# Patient Record
Sex: MALE | Race: WHITE | NOT HISPANIC OR LATINO | Employment: OTHER | URBAN - METROPOLITAN AREA
[De-identification: names, ages, dates, MRNs, and addresses within clinical notes are randomized per-mention and may not be internally consistent; named-entity substitution may affect disease eponyms.]

---

## 2023-06-19 ENCOUNTER — APPOINTMENT (OUTPATIENT)
Dept: PRIMARY CARE | Facility: CLINIC | Age: 53
End: 2023-06-19
Payer: COMMERCIAL

## 2023-06-19 PROBLEM — M25.561 RIGHT KNEE PAIN: Status: ACTIVE | Noted: 2023-06-19

## 2023-06-19 PROBLEM — H52.00 HYPEROPIA: Status: ACTIVE | Noted: 2023-06-19

## 2023-06-19 PROBLEM — Z86.69 H/O OPTIC NEURITIS: Status: ACTIVE | Noted: 2023-06-19

## 2023-06-19 PROBLEM — R39.12 WEAK URINARY STREAM: Status: ACTIVE | Noted: 2023-06-19

## 2023-06-19 PROBLEM — M54.2 CERVICAL PAIN: Status: ACTIVE | Noted: 2023-06-19

## 2023-06-19 PROBLEM — H46.9 BILATERAL OPTIC NEURITIS: Status: ACTIVE | Noted: 2023-06-19

## 2023-06-19 PROBLEM — J30.9 ALLERGIC RHINITIS: Status: ACTIVE | Noted: 2023-06-19

## 2023-06-19 PROBLEM — M17.11 ARTHRITIS OF KNEE, RIGHT: Status: ACTIVE | Noted: 2023-06-19

## 2023-06-19 PROBLEM — J01.20 ACUTE NON-RECURRENT ETHMOIDAL SINUSITIS: Status: RESOLVED | Noted: 2023-06-19 | Resolved: 2023-06-19

## 2023-06-19 PROBLEM — N41.9 PROSTATITIS: Status: RESOLVED | Noted: 2023-06-19 | Resolved: 2023-06-19

## 2023-06-19 PROBLEM — M25.562 LEFT KNEE PAIN: Status: ACTIVE | Noted: 2023-06-19

## 2023-06-19 PROBLEM — B36.0 PITYRIASIS VERSICOLOR: Status: ACTIVE | Noted: 2023-06-19

## 2023-06-19 PROBLEM — N50.89 LUMP IN THE TESTICLE: Status: ACTIVE | Noted: 2023-06-19

## 2023-06-19 PROBLEM — M25.511 RIGHT SHOULDER PAIN: Status: ACTIVE | Noted: 2023-06-19

## 2023-06-19 RX ORDER — HYDROGEN PEROXIDE 3 %
1 SOLUTION, NON-ORAL MISCELLANEOUS DAILY
COMMUNITY
End: 2023-06-20

## 2023-06-19 RX ORDER — KETOCONAZOLE 20 MG/ML
SHAMPOO, SUSPENSION TOPICAL
COMMUNITY
Start: 2017-05-23 | End: 2023-06-20

## 2023-06-19 RX ORDER — CETIRIZINE HYDROCHLORIDE 10 MG/1
10 TABLET ORAL DAILY
COMMUNITY
End: 2023-06-20

## 2023-06-19 RX ORDER — POLYMYXIN B SULFATE AND TRIMETHOPRIM 1; 10000 MG/ML; [USP'U]/ML
SOLUTION OPHTHALMIC
COMMUNITY
Start: 2021-12-29 | End: 2023-06-20

## 2023-06-19 RX ORDER — ALBUTEROL SULFATE 90 UG/1
AEROSOL, METERED RESPIRATORY (INHALATION)
COMMUNITY
Start: 2017-08-01 | End: 2023-06-20

## 2023-06-19 RX ORDER — MELOXICAM 7.5 MG/1
1 TABLET ORAL 2 TIMES DAILY PRN
COMMUNITY
Start: 2019-06-14 | End: 2023-06-20

## 2023-06-19 RX ORDER — MONTELUKAST SODIUM 10 MG/1
1 TABLET ORAL DAILY
COMMUNITY
Start: 2017-08-01 | End: 2023-06-20

## 2023-06-20 ENCOUNTER — OFFICE VISIT (OUTPATIENT)
Dept: PRIMARY CARE | Facility: CLINIC | Age: 53
End: 2023-06-20
Payer: COMMERCIAL

## 2023-06-20 VITALS
WEIGHT: 214 LBS | HEIGHT: 69 IN | SYSTOLIC BLOOD PRESSURE: 134 MMHG | OXYGEN SATURATION: 99 % | BODY MASS INDEX: 31.7 KG/M2 | TEMPERATURE: 98.6 F | DIASTOLIC BLOOD PRESSURE: 89 MMHG | HEART RATE: 84 BPM

## 2023-06-20 DIAGNOSIS — J01.00 ACUTE NON-RECURRENT MAXILLARY SINUSITIS: Primary | ICD-10-CM

## 2023-06-20 PROCEDURE — 1036F TOBACCO NON-USER: CPT | Performed by: FAMILY MEDICINE

## 2023-06-20 PROCEDURE — 99213 OFFICE O/P EST LOW 20 MIN: CPT | Performed by: FAMILY MEDICINE

## 2023-06-20 RX ORDER — DOXYCYCLINE 100 MG/1
100 CAPSULE ORAL 2 TIMES DAILY
Qty: 20 CAPSULE | Refills: 0 | Status: SHIPPED | OUTPATIENT
Start: 2023-06-20 | End: 2023-06-30

## 2023-06-20 RX ORDER — AMOXICILLIN AND CLAVULANATE POTASSIUM 875; 125 MG/1; MG/1
TABLET, FILM COATED ORAL
COMMUNITY
Start: 2023-06-17 | End: 2023-06-20

## 2023-06-20 ASSESSMENT — ENCOUNTER SYMPTOMS: COUGH: 0

## 2023-06-20 NOTE — PROGRESS NOTES
"Subjective   Patient ID: Rafael Singh is a 53 y.o. male who presents for Multiple sx's with his wife; [place]; less than two weeks after his sx's onset; ST fever, body aches, URI congestion and a rash on his arms. Pt phoned the [facility] twice; advised to try sudafed and Afrin for 3 days; did that and never improved. Called the [facility] a second time and was prescribe Augmentin which he has been on since Sunday. Lastly, Wife states he had a tick on his right lower leg and feels this may be related. Temp has been as high as 103.0F orally.    Fevers 101-102   Sick over the last  1 week  Fevers chills   Augmentin last 2 days  A little better with headache     Sore throat     Negative covid     Bite on leg, rash on arms   No ocean          Review of Systems   Respiratory:  Negative for cough.        Objective   /89   Pulse 84   Temp 37 °C (98.6 °F)   Ht 1.753 m (5' 9\")   Wt 97.1 kg (214 lb)   SpO2 99%   BMI 31.60 kg/m²     Physical Exam  Constitutional:       Appearance: Normal appearance.   HENT:      Head: Normocephalic and atraumatic.      Right Ear: Ear canal normal. Tympanic membrane is erythematous.      Left Ear: Tympanic membrane and ear canal normal.      Nose: No nasal deformity.      Right Sinus: Maxillary sinus tenderness present. No frontal sinus tenderness.      Left Sinus: Maxillary sinus tenderness present. No frontal sinus tenderness.      Mouth/Throat:      Mouth: Mucous membranes are moist. No oral lesions.      Tongue: No lesions.      Pharynx: Oropharynx is clear.   Cardiovascular:      Rate and Rhythm: Normal rate and regular rhythm.   Pulmonary:      Effort: Pulmonary effort is normal.      Breath sounds: Normal breath sounds.   Musculoskeletal:      Cervical back: No tenderness.   Lymphadenopathy:      Cervical: No cervical adenopathy.   Skin:     Comments: Scattered erythematous papules diffusely on the arms, legs, back, abdomen, present on the " bathing suit area as well as the rest of the body   Neurological:      Mental Status: He is alert.         Assessment/Plan

## 2023-06-20 NOTE — PATIENT INSTRUCTIONS
Rash: Possibly viral, unlikely medication eruption, could be see bather's   dermatitis    Tick bite is not significant because it was not implanted    We will change antibiotics though due to persisting fever on the Augmentin

## 2023-08-08 RX ORDER — KETOCONAZOLE 20 MG/ML
SHAMPOO, SUSPENSION TOPICAL
COMMUNITY
Start: 2013-10-08 | End: 2023-08-10 | Stop reason: SDUPTHER

## 2023-08-08 RX ORDER — VITAMIN A 3000 MCG
CAPSULE ORAL
COMMUNITY
Start: 2023-06-18

## 2023-08-10 ENCOUNTER — OFFICE VISIT (OUTPATIENT)
Dept: PRIMARY CARE | Facility: CLINIC | Age: 53
End: 2023-08-10
Payer: COMMERCIAL

## 2023-08-10 ENCOUNTER — LAB (OUTPATIENT)
Dept: LAB | Facility: LAB | Age: 53
End: 2023-08-10
Payer: COMMERCIAL

## 2023-08-10 VITALS
DIASTOLIC BLOOD PRESSURE: 84 MMHG | TEMPERATURE: 98.2 F | WEIGHT: 217 LBS | OXYGEN SATURATION: 97 % | SYSTOLIC BLOOD PRESSURE: 136 MMHG | HEIGHT: 69 IN | BODY MASS INDEX: 32.14 KG/M2 | HEART RATE: 55 BPM

## 2023-08-10 DIAGNOSIS — Z12.11 ENCOUNTER FOR COLORECTAL CANCER SCREENING: ICD-10-CM

## 2023-08-10 DIAGNOSIS — Z12.12 ENCOUNTER FOR COLORECTAL CANCER SCREENING: ICD-10-CM

## 2023-08-10 DIAGNOSIS — Z13.220 LIPID SCREENING: ICD-10-CM

## 2023-08-10 DIAGNOSIS — Z23 NEED FOR SHINGLES VACCINE: ICD-10-CM

## 2023-08-10 DIAGNOSIS — R06.09 DYSPNEA ON EXERTION: ICD-10-CM

## 2023-08-10 DIAGNOSIS — Z23 NEED FOR DTAP VACCINATION: ICD-10-CM

## 2023-08-10 DIAGNOSIS — J30.9 ALLERGIC RHINITIS, UNSPECIFIED SEASONALITY, UNSPECIFIED TRIGGER: ICD-10-CM

## 2023-08-10 DIAGNOSIS — R06.83 SNORING: ICD-10-CM

## 2023-08-10 DIAGNOSIS — Z00.00 ROUTINE GENERAL MEDICAL EXAMINATION AT A HEALTH CARE FACILITY: Primary | ICD-10-CM

## 2023-08-10 DIAGNOSIS — R53.83 FATIGUE, UNSPECIFIED TYPE: ICD-10-CM

## 2023-08-10 DIAGNOSIS — B36.0 PITYRIASIS VERSICOLOR: ICD-10-CM

## 2023-08-10 DIAGNOSIS — Z13.6 ENCOUNTER FOR SCREENING FOR CARDIOVASCULAR DISORDERS: ICD-10-CM

## 2023-08-10 DIAGNOSIS — Z00.00 ROUTINE GENERAL MEDICAL EXAMINATION AT A HEALTH CARE FACILITY: ICD-10-CM

## 2023-08-10 LAB
ALANINE AMINOTRANSFERASE (SGPT) (U/L) IN SER/PLAS: 52 U/L (ref 10–52)
ALBUMIN (G/DL) IN SER/PLAS: 4.5 G/DL (ref 3.4–5)
ALKALINE PHOSPHATASE (U/L) IN SER/PLAS: 72 U/L (ref 33–120)
ANION GAP IN SER/PLAS: 13 MMOL/L (ref 10–20)
ASPARTATE AMINOTRANSFERASE (SGOT) (U/L) IN SER/PLAS: 33 U/L (ref 9–39)
BILIRUBIN TOTAL (MG/DL) IN SER/PLAS: 0.5 MG/DL (ref 0–1.2)
CALCIUM (MG/DL) IN SER/PLAS: 9.3 MG/DL (ref 8.6–10.3)
CARBON DIOXIDE, TOTAL (MMOL/L) IN SER/PLAS: 27 MMOL/L (ref 21–32)
CHLORIDE (MMOL/L) IN SER/PLAS: 102 MMOL/L (ref 98–107)
CHOLESTEROL (MG/DL) IN SER/PLAS: 211 MG/DL (ref 0–199)
CHOLESTEROL IN HDL (MG/DL) IN SER/PLAS: 53.2 MG/DL
CHOLESTEROL/HDL RATIO: 4
CREATININE (MG/DL) IN SER/PLAS: 0.99 MG/DL (ref 0.5–1.3)
ERYTHROCYTE DISTRIBUTION WIDTH (RATIO) BY AUTOMATED COUNT: 13.4 % (ref 11.5–14.5)
ERYTHROCYTE MEAN CORPUSCULAR HEMOGLOBIN CONCENTRATION (G/DL) BY AUTOMATED: 32.6 G/DL (ref 32–36)
ERYTHROCYTE MEAN CORPUSCULAR VOLUME (FL) BY AUTOMATED COUNT: 86 FL (ref 80–100)
ERYTHROCYTES (10*6/UL) IN BLOOD BY AUTOMATED COUNT: 4.97 X10E12/L (ref 4.5–5.9)
GFR MALE: >90 ML/MIN/1.73M2
GLUCOSE (MG/DL) IN SER/PLAS: 97 MG/DL (ref 74–99)
HEMATOCRIT (%) IN BLOOD BY AUTOMATED COUNT: 42.6 % (ref 41–52)
HEMOGLOBIN (G/DL) IN BLOOD: 13.9 G/DL (ref 13.5–17.5)
LDL: 122 MG/DL (ref 0–99)
LEUKOCYTES (10*3/UL) IN BLOOD BY AUTOMATED COUNT: 7.1 X10E9/L (ref 4.4–11.3)
PLATELETS (10*3/UL) IN BLOOD AUTOMATED COUNT: 256 X10E9/L (ref 150–450)
POTASSIUM (MMOL/L) IN SER/PLAS: 4.3 MMOL/L (ref 3.5–5.3)
PROTEIN TOTAL: 7.3 G/DL (ref 6.4–8.2)
SODIUM (MMOL/L) IN SER/PLAS: 138 MMOL/L (ref 136–145)
THYROTROPIN (MIU/L) IN SER/PLAS BY DETECTION LIMIT <= 0.05 MIU/L: 2.4 MIU/L (ref 0.44–3.98)
TRIGLYCERIDE (MG/DL) IN SER/PLAS: 179 MG/DL (ref 0–149)
UREA NITROGEN (MG/DL) IN SER/PLAS: 12 MG/DL (ref 6–23)
VLDL: 36 MG/DL (ref 0–40)

## 2023-08-10 PROCEDURE — 80061 LIPID PANEL: CPT

## 2023-08-10 PROCEDURE — 99396 PREV VISIT EST AGE 40-64: CPT | Performed by: FAMILY MEDICINE

## 2023-08-10 PROCEDURE — 1036F TOBACCO NON-USER: CPT | Performed by: FAMILY MEDICINE

## 2023-08-10 PROCEDURE — 82607 VITAMIN B-12: CPT

## 2023-08-10 PROCEDURE — 90472 IMMUNIZATION ADMIN EACH ADD: CPT | Performed by: FAMILY MEDICINE

## 2023-08-10 PROCEDURE — 90471 IMMUNIZATION ADMIN: CPT | Performed by: FAMILY MEDICINE

## 2023-08-10 PROCEDURE — 80053 COMPREHEN METABOLIC PANEL: CPT

## 2023-08-10 PROCEDURE — 99214 OFFICE O/P EST MOD 30 MIN: CPT | Performed by: FAMILY MEDICINE

## 2023-08-10 PROCEDURE — 83036 HEMOGLOBIN GLYCOSYLATED A1C: CPT

## 2023-08-10 PROCEDURE — 84443 ASSAY THYROID STIM HORMONE: CPT

## 2023-08-10 PROCEDURE — 85027 COMPLETE CBC AUTOMATED: CPT

## 2023-08-10 PROCEDURE — 90750 HZV VACC RECOMBINANT IM: CPT | Performed by: FAMILY MEDICINE

## 2023-08-10 PROCEDURE — 36415 COLL VENOUS BLD VENIPUNCTURE: CPT

## 2023-08-10 PROCEDURE — 90715 TDAP VACCINE 7 YRS/> IM: CPT | Performed by: FAMILY MEDICINE

## 2023-08-10 PROCEDURE — 93000 ELECTROCARDIOGRAM COMPLETE: CPT | Performed by: FAMILY MEDICINE

## 2023-08-10 RX ORDER — KETOCONAZOLE 20 MG/ML
SHAMPOO, SUSPENSION TOPICAL
Qty: 30 ML | Refills: 2 | OUTPATIENT
Start: 2023-08-10 | End: 2023-10-16

## 2023-08-10 RX ORDER — MINERAL OIL
180 ENEMA (ML) RECTAL DAILY
COMMUNITY

## 2023-08-10 RX ORDER — MONTELUKAST SODIUM 10 MG/1
10 TABLET ORAL NIGHTLY
Qty: 90 TABLET | Refills: 3 | OUTPATIENT
Start: 2023-08-10 | End: 2023-10-16

## 2023-08-10 RX ORDER — ALBUTEROL SULFATE 90 UG/1
2 AEROSOL, METERED RESPIRATORY (INHALATION) EVERY 4 HOURS PRN
Qty: 8 G | Refills: 2 | OUTPATIENT
Start: 2023-08-10 | End: 2023-10-16

## 2023-08-10 RX ORDER — MONTELUKAST SODIUM 10 MG/1
10 TABLET ORAL NIGHTLY
COMMUNITY
End: 2023-08-10 | Stop reason: SDUPTHER

## 2023-08-10 NOTE — PATIENT INSTRUCTIONS
Get your blood work as ordered.  You should hear from our office with results whether they are normal are not within a few days.  Please call the office if you do not hear from us.     You should be getting cardiovascular exercise 3-5 times per week for 30-45 minutes.  This includes exercises such as running, brisk walking, biking or swimming.     Allergy treatment :  You can take over-the-counter allergy medications.  There are all pretty similar: Claritin, Allegra, Zyrtec and Xyzal.  You can also try Benadryl however that is more likely to make you tired.  If the oral medication is not sufficient, you can add on an over-the-counter nasal spray like Flonase or Nasacort, his nasal sprays take a few days to notice improvement.  There are a couple of over-the-counter eyedrops that work well : Zaditor and Alaway.  Or PATADAY  If you continue to have symptoms and these medications are not helping, follow-up in the office as there are prescription medications we could add on if we need to.      There are several recommendations for colon cancer screening.  The new recommendations include screening at the age of 45 and above.  Colonoscopy generally is the best test for colon cancer screening.  This involves a procedure GI doctor looks at your colon through a camera.  The benefit of doing the colonoscopy as polyps can be removed during the colonoscopy to help prevent cancer.  This involves light sedation and requires cleaning out the colon before the procedure.  If you do not have any substantial polyps this is done every 10 years.  The other option for colon cancer screening is Cologuard stool tests.  These are almost as effective with regards to picking up colon cancer.  However, they do not  polyps so its not preventing cancer.  These are done every 3 years.  If you have a positive Cologuard test then you need to proceed with a colonoscopy.      It is important to actively try to reduce your weight to become  healthier.  There are several things you can do to try and lose weight.  You should be getting 30-45 minutes of cardiovascular exercise 3-5 times per week, activities such as running and jogging treadmill swimming and brisk walking are helpful.  You will also need to watch your portion control, decrease calorie intake overall.  Following a low carbohydrate diet is the most successful way to lose weight and take it off long-term.  In order to achieve weight loss it is important that you track exactly what your calorie intake is during the day.  I usually recommend doing some sort of formal program or Will. there are lot of good Apps out there to help you follow your calorie intake such as weight watchers, Noom, Fitbit.  It is recommended that you reduce the intake of sweets, sugar, reduce carbohydrate intake.  Healthy diets with more whole grains, vegetables, fruits, nuts and seeds with plant oils are healthier diets than animal-based fats.  Reduce your intake of white bread, grains, potatoes, processed foods.     Inhaler as needed
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
## 2023-08-11 LAB
COBALAMIN (VITAMIN B12) (PG/ML) IN SER/PLAS: 445 PG/ML (ref 211–911)
ESTIMATED AVERAGE GLUCOSE FOR HBA1C: 108 MG/DL
HEMOGLOBIN A1C/HEMOGLOBIN TOTAL IN BLOOD: 5.4 %

## 2023-08-21 ENCOUNTER — TELEPHONE (OUTPATIENT)
Dept: PRIMARY CARE | Facility: CLINIC | Age: 53
End: 2023-08-21
Payer: COMMERCIAL

## 2023-08-21 DIAGNOSIS — R91.1 LUNG NODULE: Primary | ICD-10-CM

## 2023-08-21 NOTE — TELEPHONE ENCOUNTER
----- Message from Marti Bansal MD sent at 8/21/2023 12:58 PM EDT -----  Please notify pt of radiology results coronary calcium score looks good only minimal atherosclerosis, value is 3  Incidentally noted small lung nodule, these generally are benign, however, given his history of tobacco abuse in the past I would like to repeat a CT in 1 year to assess for stability

## 2023-08-21 NOTE — TELEPHONE ENCOUNTER
Result Communication    Resulted Orders   CT cardiac scoring wo IV contrast    Narrative    Interpreted By:  SAMIR MARTIN DO  MRN: [id number]  Patient Name: SHARONA RODRIGUEZ     STUDY:  CT CARDIAC SCORING;  8/17/2023 11:30 am     INDICATION:  screen.     COMPARISON:  None.     ACCESSION NUMBER(S):  [id number]     ORDERING CLINICIAN:  BERKLEY FLEMING     TECHNIQUE:  Using prospective ECG gating, limited CT scan of the coronary  arteries was performed without intravenous contrast. Coronary calcium  scoring  was performed according to the method of Agatston.     FINDINGS:  The score and distribution of calcium in the coronary arteries is as  follows:     LM: 0.  LAD: 0.  LCx: 0.  RCA: 3.8.     Total: 3.8.     The visualized segments of the lungs are normally expanded. 4 mm  nodule along left major fissure image 3. Subcentimeter calcified left  lung nodule likely granuloma.     The visualized mid/lower ascending thoracic aorta measures 3.6 cm in  diameter.     The heart is normal in size. Trace pericardial effusion.     Prominent calcified mediastinal and left hilar nodes.     Small hiatal hernia. Fatty liver.       Impression    1. Coronary artery calcium score of 3.8 *.  2. 4 mm left lung nodule, likely benign. No further follow-up is  required, however, if the patient has high risk factors for primary  lung malignancy, follow-up noncontrast CT scan chest in 12 months may  be obtained. (Satnam Pierrehomichael et al., Guidelines for management of  incidental pulmonary nodules detected on CT images: From the  Fleischner Society 2017, Radiology. 2017 Jul;284 (1):228-243.)  [email or web address]  3. Subcentimeter calcified left lung nodule and prominent calcified  mediastinal and left hilar nodes likely sequela of granulomatous  disease.     *Coronary artery calcium scoring may be helpful in predicting the  risk for future coronary heart disease events.  According to the  American College of Cardiology Foundation Clinical Expert  "Consensus  Task Force, such testing provides important prognostic information in  patients with more than one coronary heart disease risk factor. The  coronary artery calcium score correlates with the annual risk of a  non-fatal myocardial infarction or coronary heart disease death.     Coronary artery score            Annual Risk     0-99                             0.4%  100-399                        1.3%  >400                            2.4%     These three \"breakpoints\" correspond to lower, intermediate and high  risk states for future coronary events.  Such information should be  used, along with appropriate clinical judgment, to make decisions  regarding the intensity of risk factor management strategies to treat  blood lipids and to modify other non-lipid coronary risk factors.     Reference: Lorena P et al. Circulation.  2007; 115:402-426       4:47 PM  Marti Bansal MD  [place] [id number] Clinical Support Staff  Please notify pt of radiology results coronary calcium score looks good only minimal atherosclerosis, value is 3  Incidentally noted small lung nodule, these generally are benign, however, given his history of tobacco abuse in the past I would like to repeat a CT in 1 year to assess for stability    Results were successfully communicated with the patient and they acknowledged their understanding.    "

## 2023-08-28 LAB — NONINV COLON CA DNA+OCC BLD SCRN STL QL: POSITIVE

## 2023-08-29 ENCOUNTER — TELEPHONE (OUTPATIENT)
Dept: PRIMARY CARE | Facility: CLINIC | Age: 53
End: 2023-08-29
Payer: COMMERCIAL

## 2023-08-29 DIAGNOSIS — Z12.11 COLON CANCER SCREENING: ICD-10-CM

## 2023-08-29 DIAGNOSIS — R19.5 POSITIVE COLORECTAL CANCER SCREENING USING COLOGUARD TEST: Primary | ICD-10-CM

## 2023-08-29 NOTE — TELEPHONE ENCOUNTER
Result Communication    Resulted Orders   Cologuard® colon cancer screening   Result Value Ref Range    NONINV COLON CA DNA+OCC BLD SCRN STL QL Positive (A) Negative      Comment:        POSITIVE TEST RESULT. A positive Cologuard result should be followed with a colonoscopy or visual examination of the colon. The normal value (reference range) for this assay is negative.    TEST DESCRIPTION: Composite algorithmic analysis of stool DNA-biomarkers with hemoglobin immunoassay.   Quantitative values of individual biomarkers are not reportable and are not associated with individual biomarker result reference ranges. Cologuard is intended for colorectal cancer screening of adults of either sex, 45 years or older, who are at average-risk for colorectal cancer (CRC). Cologuard has been approved for use by the U.S. FDA. The performance of Cologuard was established in a cross sectional study of average-risk adults aged 50-84. Cologuard performance in patients ages 45 to 49 years was estimated by sub-group analysis of near-age groups. Colonoscopies performed for a positive result may find as the most clinically significant lesion: colorectal cancer [4.0%], advanced adenoma  (including sessile serrated polyps greater than or equal to 1cm diameter) [20%] or non- advanced adenoma [31%]; or no colorectal neoplasia [45%]. These estimates are derived from a prospective cross-sectional screening study of 10,000 individuals at average risk for colorectal cancer who were screened with both Cologuard and colonoscopy. (Gustabo Rubio al, N Engl J Med 2014;370(14):6057-6955.) Cologuard may produce a false negative or false positive result (no colorectal cancer or precancerous polyp present at colonoscopy follow up). A negative Cologuard test result does not guarantee the absence of CRC or advanced adenoma (pre-cancer). The current Cologuard screening interval is every 3 years. (American Cancer Society and U.S. Multi-Society Task Force).  Cologuard performance data in a 10,000 patient pivotal study using colonoscopy as the reference method can be accessed at the following location: [email or web address].AirCell/results. Additional description of the Cologuard test process,  warnings and precautions can be found at www.Cadee.[email or web address].         4:58 PM      Results were successfully communicated with the patient and they acknowledged their understanding.

## 2023-10-13 ENCOUNTER — ANESTHESIA EVENT (OUTPATIENT)
Dept: OPERATING ROOM | Facility: HOSPITAL | Age: 53
End: 2023-10-13
Payer: COMMERCIAL

## 2023-10-13 PROBLEM — S16.1XXA CERVICAL STRAIN: Status: ACTIVE | Noted: 2023-10-13

## 2023-10-13 PROBLEM — M47.9 SPONDYLOSIS: Status: ACTIVE | Noted: 2023-10-13

## 2023-10-13 PROBLEM — M21.70 LEG LENGTH DISCREPANCY: Status: ACTIVE | Noted: 2023-10-13

## 2023-10-13 PROBLEM — M54.42 LUMBAGO WITH SCIATICA, LEFT SIDE: Status: ACTIVE | Noted: 2023-10-13
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
## 2023-10-13 PROBLEM — M51.369 DDD (DEGENERATIVE DISC DISEASE), LUMBAR: Status: ACTIVE | Noted: 2023-10-13

## 2023-10-13 PROBLEM — M43.10 RETROLISTHESIS OF VERTEBRAE: Status: ACTIVE | Noted: 2023-10-13

## 2023-10-13 PROBLEM — M54.41 LUMBAGO WITH SCIATICA, RIGHT SIDE: Status: ACTIVE | Noted: 2023-10-13

## 2023-10-13 PROBLEM — Q66.6 VALGUS DEFORMITY OF BOTH FEET: Status: ACTIVE | Noted: 2023-10-13

## 2023-10-13 PROBLEM — H52.4 HYPEROPIA WITH PRESBYOPIA: Status: ACTIVE | Noted: 2023-06-19

## 2023-10-13 PROBLEM — M47.816 FACET HYPERTROPHY OF LUMBAR REGION: Status: ACTIVE | Noted: 2023-10-13

## 2023-10-13 PROBLEM — M51.36 DDD (DEGENERATIVE DISC DISEASE), LUMBAR: Status: ACTIVE | Noted: 2023-10-13

## 2023-10-13 PROBLEM — M48.061 FORAMINAL STENOSIS OF LUMBAR REGION: Status: ACTIVE | Noted: 2023-10-13

## 2023-10-13 PROBLEM — M54.12 CERVICAL RADICULOPATHY: Status: ACTIVE | Noted: 2023-10-13

## 2023-10-13 PROBLEM — M47.814 THORACIC SPONDYLOSIS: Status: ACTIVE | Noted: 2023-10-13

## 2023-10-13 RX ORDER — HYDROGEN PEROXIDE 3 %
20 SOLUTION, NON-ORAL MISCELLANEOUS
COMMUNITY

## 2023-10-13 RX ORDER — MENTHOL 40 MG/ML
1 GEL TOPICAL 3 TIMES DAILY PRN
COMMUNITY
End: 2023-10-16 | Stop reason: ALTCHOICE

## 2023-10-13 RX ORDER — CYCLOBENZAPRINE HCL 10 MG
10 TABLET ORAL NIGHTLY PRN
COMMUNITY
Start: 2022-11-21 | End: 2024-01-02 | Stop reason: ALTCHOICE

## 2023-10-13 RX ORDER — IBUPROFEN AND FAMOTIDINE 26.6; 8 MG/1; MG/1
1 TABLET ORAL 3 TIMES DAILY
COMMUNITY
End: 2024-01-02 | Stop reason: ALTCHOICE

## 2023-10-13 RX ORDER — CETIRIZINE HYDROCHLORIDE 10 MG/1
TABLET, CHEWABLE ORAL
COMMUNITY
End: 2024-01-02 | Stop reason: ALTCHOICE

## 2023-10-13 RX ORDER — ACETAMINOPHEN 325 MG/1
325 TABLET ORAL EVERY 4 HOURS
COMMUNITY
End: 2024-01-02 | Stop reason: ALTCHOICE

## 2023-10-13 RX ORDER — MELOXICAM 7.5 MG/1
7.5 TABLET ORAL 2 TIMES DAILY PRN
COMMUNITY
End: 2024-01-02 | Stop reason: ALTCHOICE

## 2023-10-13 RX ORDER — POLYMYXIN B SULFATE AND TRIMETHOPRIM 1; 10000 MG/ML; [USP'U]/ML
1 SOLUTION OPHTHALMIC EVERY 4 HOURS
COMMUNITY
End: 2024-01-02 | Stop reason: ALTCHOICE

## 2023-10-16 ENCOUNTER — HOSPITAL ENCOUNTER (OUTPATIENT)
Dept: OPERATING ROOM | Facility: HOSPITAL | Age: 53
Setting detail: OUTPATIENT SURGERY
Discharge: HOME | End: 2023-10-16
Payer: COMMERCIAL

## 2023-10-16 ENCOUNTER — ANESTHESIA (OUTPATIENT)
Dept: OPERATING ROOM | Facility: HOSPITAL | Age: 53
End: 2023-10-16
Payer: COMMERCIAL

## 2023-10-16 VITALS
DIASTOLIC BLOOD PRESSURE: 80 MMHG | TEMPERATURE: 97.7 F | WEIGHT: 216.93 LBS | OXYGEN SATURATION: 98 % | BODY MASS INDEX: 32.04 KG/M2 | HEART RATE: 72 BPM | RESPIRATION RATE: 15 BRPM | SYSTOLIC BLOOD PRESSURE: 126 MMHG

## 2023-10-16 DIAGNOSIS — Z12.11 COLON CANCER SCREENING: ICD-10-CM

## 2023-10-16 DIAGNOSIS — R19.5 POSITIVE COLORECTAL CANCER SCREENING USING COLOGUARD TEST: ICD-10-CM

## 2023-10-16 PROCEDURE — 88305 TISSUE EXAM BY PATHOLOGIST: CPT | Mod: TC | Performed by: SURGERY

## 2023-10-16 PROCEDURE — 3600000002 HC OR TIME - INITIAL BASE CHARGE - PROCEDURE LEVEL TWO: Performed by: ANESTHESIOLOGY

## 2023-10-16 PROCEDURE — 45385 COLONOSCOPY W/LESION REMOVAL: CPT | Performed by: SURGERY

## 2023-10-16 PROCEDURE — A45385 PR COLONOSCOPY,REMV LESN,SNARE: Performed by: NURSE ANESTHETIST, CERTIFIED REGISTERED

## 2023-10-16 PROCEDURE — A45385 PR COLONOSCOPY,REMV LESN,SNARE: Performed by: ANESTHESIOLOGY

## 2023-10-16 PROCEDURE — 3700000002 HC GENERAL ANESTHESIA TIME - EACH INCREMENTAL 1 MINUTE: Performed by: ANESTHESIOLOGY

## 2023-10-16 PROCEDURE — 2500000005 HC RX 250 GENERAL PHARMACY W/O HCPCS: Performed by: NURSE ANESTHETIST, CERTIFIED REGISTERED

## 2023-10-16 PROCEDURE — 88305 TISSUE EXAM BY PATHOLOGIST: CPT | Performed by: PATHOLOGY

## 2023-10-16 PROCEDURE — 2580000001 HC RX 258 IV SOLUTIONS: Performed by: ANESTHESIOLOGY

## 2023-10-16 PROCEDURE — 3600000007 HC OR TIME - EACH INCREMENTAL 1 MINUTE - PROCEDURE LEVEL TWO: Performed by: ANESTHESIOLOGY

## 2023-10-16 PROCEDURE — 3700000001 HC GENERAL ANESTHESIA TIME - INITIAL BASE CHARGE: Performed by: ANESTHESIOLOGY

## 2023-10-16 PROCEDURE — 2580000001 HC RX 258 IV SOLUTIONS: Performed by: NURSE ANESTHETIST, CERTIFIED REGISTERED

## 2023-10-16 PROCEDURE — 7100000009 HC PHASE TWO TIME - INITIAL BASE CHARGE: Performed by: ANESTHESIOLOGY

## 2023-10-16 PROCEDURE — 2500000004 HC RX 250 GENERAL PHARMACY W/ HCPCS (ALT 636 FOR OP/ED): Performed by: NURSE ANESTHETIST, CERTIFIED REGISTERED

## 2023-10-16 PROCEDURE — 7100000010 HC PHASE TWO TIME - EACH INCREMENTAL 1 MINUTE: Performed by: ANESTHESIOLOGY

## 2023-10-16 PROCEDURE — 88305 TISSUE EXAM BY PATHOLOGIST: CPT | Mod: TC,59 | Performed by: SURGERY

## 2023-10-16 PROCEDURE — 88305 TISSUE EXAM BY PATHOLOGIST: CPT | Mod: TC,SUR | Performed by: SURGERY

## 2023-10-16 RX ORDER — MIDAZOLAM HYDROCHLORIDE 1 MG/ML
INJECTION, SOLUTION INTRAMUSCULAR; INTRAVENOUS AS NEEDED
Status: DISCONTINUED | OUTPATIENT
Start: 2023-10-16 | End: 2023-10-16

## 2023-10-16 RX ORDER — LIDOCAINE HYDROCHLORIDE 10 MG/ML
INJECTION, SOLUTION EPIDURAL; INFILTRATION; INTRACAUDAL; PERINEURAL AS NEEDED
Status: DISCONTINUED | OUTPATIENT
Start: 2023-10-16 | End: 2023-10-16

## 2023-10-16 RX ORDER — SODIUM CHLORIDE, SODIUM LACTATE, POTASSIUM CHLORIDE, CALCIUM CHLORIDE 600; 310; 30; 20 MG/100ML; MG/100ML; MG/100ML; MG/100ML
100 INJECTION, SOLUTION INTRAVENOUS CONTINUOUS
Status: DISCONTINUED | OUTPATIENT
Start: 2023-10-16 | End: 2023-10-20 | Stop reason: HOSPADM

## 2023-10-16 RX ORDER — PROPOFOL 10 MG/ML
INJECTION, EMULSION INTRAVENOUS CONTINUOUS PRN
Status: DISCONTINUED | OUTPATIENT
Start: 2023-10-16 | End: 2023-10-16

## 2023-10-16 RX ORDER — PROPOFOL 10 MG/ML
INJECTION, EMULSION INTRAVENOUS AS NEEDED
Status: DISCONTINUED | OUTPATIENT
Start: 2023-10-16 | End: 2023-10-16

## 2023-10-16 RX ADMIN — LIDOCAINE HYDROCHLORIDE 50 MG: 10 INJECTION, SOLUTION EPIDURAL; INFILTRATION; INTRACAUDAL; PERINEURAL at 14:07

## 2023-10-16 RX ADMIN — MIDAZOLAM 2 MG: 1 INJECTION INTRAMUSCULAR; INTRAVENOUS at 14:02

## 2023-10-16 RX ADMIN — SODIUM CHLORIDE, POTASSIUM CHLORIDE, SODIUM LACTATE AND CALCIUM CHLORIDE 100 ML/HR: 600; 310; 30; 20 INJECTION, SOLUTION INTRAVENOUS at 12:38

## 2023-10-16 RX ADMIN — SODIUM CHLORIDE, SODIUM LACTATE, POTASSIUM CHLORIDE, AND CALCIUM CHLORIDE: 600; 310; 30; 20 INJECTION, SOLUTION INTRAVENOUS at 13:59

## 2023-10-16 RX ADMIN — PROPOFOL 160 MCG/KG/MIN: 10 INJECTION, EMULSION INTRAVENOUS at 14:08

## 2023-10-16 RX ADMIN — PROPOFOL 60 MG: 10 INJECTION, EMULSION INTRAVENOUS at 14:08

## 2023-10-16 SDOH — HEALTH STABILITY: MENTAL HEALTH: CURRENT SMOKER: 0

## 2023-10-16 ASSESSMENT — PAIN SCALES - GENERAL
PAINLEVEL_OUTOF10: 0 - NO PAIN
PAINLEVEL_OUTOF10: 0 - NO PAIN

## 2023-10-16 ASSESSMENT — PAIN - FUNCTIONAL ASSESSMENT
PAIN_FUNCTIONAL_ASSESSMENT: 0-10
PAIN_FUNCTIONAL_ASSESSMENT: 0-10

## 2023-10-16 NOTE — ADDENDUM NOTE
Addendum  created 10/16/23 1455 by Ruperto Bean MD    Attestation recorded in Intraprocedure, Intraprocedure Attestations filed

## 2023-10-16 NOTE — ANESTHESIA PREPROCEDURE EVALUATION
Patient: Rafael Singh    Procedure Information       Date/Time: 10/16/23 1200    Scheduled providers: Cj Barker MD; Ruperto Bean MD; ANNAMARIA Miller    Procedure: COLONOSCOPY    Location: [facility] OR            Relevant Problems   Neuro/Psych   (+) Cervical radiculopathy   (+) Lumbago with sciatica, left side   (+) Lumbago with sciatica, right side      Musculoskeletal   (+) DDD (degenerative disc disease), lumbar   (+) Foraminal stenosis of lumbar region   (+) Spondylosis   (+) Thoracic spondylosis      Infectious Disease   (+) Pityriasis versicolor      Other   (+) Arthritis of knee, right       Clinical information reviewed:   Tobacco  Allergies  Meds   Med Hx  Surg Hx   Fam Hx  Soc Hx        NPO Detail:  NPO/Void Status  Date of Last Liquid: 10/15/23  Time of Last Liquid: 2300  Date of Last Solid: 10/15/23  Time of Last Solid: 2300  Last Intake Type: Clear fluids  Time of Last Void: 1100         Physical Exam    Airway  Mallampati: II  TM distance: >3 FB  Neck ROM: full     Cardiovascular - normal exam  Rhythm: regular  Rate: normal     Dental - normal exam     Pulmonary - normal exam  Breath sounds clear to auscultation     Abdominal   Abdomen: soft  Bowel sounds: normal       Anesthesia Plan    ASA 2     MAC     The patient is not a current smoker.  Patient was previously instructed to abstain from smoking on day of procedure.  Patient did not smoke on day of procedure.    intravenous induction   Anesthetic plan and risks discussed with patient.  Use of blood products discussed with patient who consented to blood products.    Plan discussed with attending.

## 2023-10-16 NOTE — H&P
History Of Present Illness  Rafael Singh is a 53 y.o. male presenting with positive cologuard for a colonoscopy.     Past Medical History  Past Medical History:   Diagnosis Date    Acute bronchitis, unspecified 06/15/2017    Acute bronchitis    Other conditions influencing health status     No significant past medical history    Prostatitis 06/19/2023       Surgical History  History reviewed. No pertinent surgical history.     Social History  He reports that he has quit smoking. His smoking use included cigarettes. He has never used smokeless tobacco. He reports current alcohol use. He reports that he does not use drugs.    Family History  Family History   Adopted: Yes   Problem Relation Name Age of Onset    No Known Problems Mother      No Known Problems Father          Allergies  Amoxicillin and Pollen extracts    Review of Systems     Physical Exam     Last Recorded Vitals  There were no vitals taken for this visit.    Relevant Results             Assessment/Plan   Active Problems:  There are no active Hospital Problems.      colonoscopy       I spent 5 minutes in the professional and overall care of this patient.      Cj Barker MD

## 2023-10-16 NOTE — ANESTHESIA POSTPROCEDURE EVALUATION
Patient: Rafael Singh    Procedure Summary       Date: 10/16/23 Room / Location: [facility], [place] OR    Anesthesia Start: 1359 Anesthesia Stop: 1443    Procedure: COLONOSCOPY Diagnosis:       Positive colorectal cancer screening using Cologuard test      Colon cancer screening    Scheduled Providers: Cj Barker MD; Ruperto Bean MD; HIPOLITO Miller-CRNA Responsible Provider: Ruperto Bean MD    Anesthesia Type: MAC ASA Status: 2            Anesthesia Type: MAC    Vitals Value Taken Time   /71 10/16/23 1441   Temp 36.5 °C (97.7 °F) 10/16/23 1441   Pulse 70 10/16/23 1441   Resp 16 10/16/23 1441   SpO2 97 % 10/16/23 1441       Anesthesia Post Evaluation    No notable events documented.

## 2023-10-23 LAB
LABORATORY COMMENT REPORT: NORMAL
PATH REPORT.FINAL DX SPEC: NORMAL
PATH REPORT.GROSS SPEC: NORMAL
PATH REPORT.MICROSCOPIC SPEC OTHER STN: NORMAL
PATH REPORT.RELEVANT HX SPEC: NORMAL
PATH REPORT.TOTAL CANCER: NORMAL

## 2023-10-24 DIAGNOSIS — M25.561 RIGHT KNEE PAIN, UNSPECIFIED CHRONICITY: ICD-10-CM

## 2023-10-24 DIAGNOSIS — M25.562 LEFT KNEE PAIN, UNSPECIFIED CHRONICITY: ICD-10-CM

## 2023-10-25 ENCOUNTER — HOSPITAL ENCOUNTER (OUTPATIENT)
Dept: RADIOLOGY | Facility: HOSPITAL | Age: 53
Discharge: HOME | End: 2023-10-25
Payer: COMMERCIAL

## 2023-10-25 ENCOUNTER — OFFICE VISIT (OUTPATIENT)
Dept: ORTHOPEDIC SURGERY | Facility: HOSPITAL | Age: 53
End: 2023-10-25
Payer: COMMERCIAL

## 2023-10-25 DIAGNOSIS — M25.562 CHRONIC PAIN OF LEFT KNEE: Primary | ICD-10-CM

## 2023-10-25 DIAGNOSIS — G89.29 CHRONIC PAIN OF LEFT KNEE: Primary | ICD-10-CM

## 2023-10-25 DIAGNOSIS — M25.561 RIGHT KNEE PAIN, UNSPECIFIED CHRONICITY: ICD-10-CM

## 2023-10-25 DIAGNOSIS — M25.562 LEFT KNEE PAIN, UNSPECIFIED CHRONICITY: ICD-10-CM

## 2023-10-25 DIAGNOSIS — G89.29 CHRONIC PAIN OF RIGHT KNEE: ICD-10-CM

## 2023-10-25 DIAGNOSIS — M25.561 CHRONIC PAIN OF RIGHT KNEE: ICD-10-CM

## 2023-10-25 PROCEDURE — 99203 OFFICE O/P NEW LOW 30 MIN: CPT | Performed by: ORTHOPAEDIC SURGERY

## 2023-10-25 PROCEDURE — 73562 X-RAY EXAM OF KNEE 3: CPT | Mod: LT,FY

## 2023-10-25 PROCEDURE — 73560 X-RAY EXAM OF KNEE 1 OR 2: CPT | Mod: RT,FY

## 2023-10-25 PROCEDURE — 99213 OFFICE O/P EST LOW 20 MIN: CPT | Performed by: ORTHOPAEDIC SURGERY

## 2023-10-25 PROCEDURE — 73564 X-RAY EXAM KNEE 4 OR MORE: CPT | Mod: RIGHT SIDE | Performed by: RADIOLOGY

## 2023-10-25 PROCEDURE — 1036F TOBACCO NON-USER: CPT | Performed by: ORTHOPAEDIC SURGERY

## 2023-10-25 NOTE — PROGRESS NOTES
HPI  This is a pleasant 53 y.o. male here today for further evaluation of bilateral knee pain. He has a known history of OA in both of his knees. He's had pain for years.    It is worse with walking and increased activity.  It is improved with rest.  They do feel there has been swelling.   The pain is described as aching.  Pain is rated a 3.  They have had treatment including therapy, viscosupplementation . He felt he had great relief previously w/ the viscosupplementation. They are here today for further evaluation.        ROS  Reviewed and all pertinent positives mentioned in HPI.      EXAM  Patient is in no acute distress.  They are alert and oriented x3.  They are of normal mood and affect.  They are in no acute distress.  The patient's limb is warm and well-perfused.  They have intact sensation to light touch in all lower extremity dermatomes.  There is a minimal effusion.    The patient's quadriceps and hamstring strength is 5 of 5.    The patient can do a straight leg raise with no radicular pain.  negative lachmans. negative posterior drawer.  There is 3+ patellofemoral crepitus.   The knee is stable to varus and valgus stress at both 0 and 30°.  There is tenderness along the  bilateral  joint line.  negative McMurrays      IMAGING  Imaging reviewed today reveal no gross fracture or dislocation.  Degenerative changes noted in the in all compartments.  MRI reviewed reveals No MRI for review      ASSESSMENT  bilateral osteoarthritis      PLAN  53M presents w/ bilateral knee OA. We discussed the nature of this condition and the various treatment options. He is interested in receiving viscosupplementation injections with one of our non-operative sports medicine providers. We will refer him. All questions and concerns were addressed.

## 2023-11-01 ENCOUNTER — OFFICE VISIT (OUTPATIENT)
Dept: ORTHOPEDIC SURGERY | Facility: HOSPITAL | Age: 53
End: 2023-11-01
Payer: COMMERCIAL

## 2023-11-01 DIAGNOSIS — M17.0 ARTHRITIS OF BOTH KNEES: Primary | ICD-10-CM

## 2023-11-01 PROCEDURE — 99214 OFFICE O/P EST MOD 30 MIN: CPT | Mod: GC | Performed by: STUDENT IN AN ORGANIZED HEALTH CARE EDUCATION/TRAINING PROGRAM

## 2023-11-01 PROCEDURE — 99204 OFFICE O/P NEW MOD 45 MIN: CPT | Performed by: STUDENT IN AN ORGANIZED HEALTH CARE EDUCATION/TRAINING PROGRAM

## 2023-11-01 PROCEDURE — 1036F TOBACCO NON-USER: CPT | Performed by: STUDENT IN AN ORGANIZED HEALTH CARE EDUCATION/TRAINING PROGRAM

## 2023-11-01 ASSESSMENT — PAIN - FUNCTIONAL ASSESSMENT: PAIN_FUNCTIONAL_ASSESSMENT: 0-10

## 2023-11-01 ASSESSMENT — PAIN DESCRIPTION - DESCRIPTORS: DESCRIPTORS: DULL;ACHING

## 2023-11-01 ASSESSMENT — PAIN SCALES - GENERAL: PAINLEVEL_OUTOF10: 4

## 2024-01-02 ENCOUNTER — TELEMEDICINE (OUTPATIENT)
Dept: PRIMARY CARE | Facility: CLINIC | Age: 54
End: 2024-01-02
Payer: COMMERCIAL

## 2024-01-02 VITALS — TEMPERATURE: 98.7 F

## 2024-01-02 DIAGNOSIS — B34.2 CORONAVIRUS INFECTION: ICD-10-CM

## 2024-01-02 DIAGNOSIS — B34.9 VIRAL INFECTION: ICD-10-CM

## 2024-01-02 DIAGNOSIS — U07.1 COVID-19: ICD-10-CM

## 2024-01-02 DIAGNOSIS — U07.1 COVID-19: Primary | ICD-10-CM
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
## 2024-01-02 PROCEDURE — 99213 OFFICE O/P EST LOW 20 MIN: CPT | Performed by: FAMILY MEDICINE

## 2024-01-02 RX ORDER — NIRMATRELVIR AND RITONAVIR 300-100 MG
3 KIT ORAL 2 TIMES DAILY
Qty: 30 TABLET | Refills: 0 | Status: SHIPPED | OUTPATIENT
Start: 2024-01-02 | End: 2024-01-07

## 2024-01-02 RX ORDER — NIRMATRELVIR AND RITONAVIR 300-100 MG
KIT ORAL
Refills: 0 | OUTPATIENT
Start: 2024-01-02

## 2024-01-02 RX ORDER — NIRMATRELVIR AND RITONAVIR 300-100 MG
3 KIT ORAL 2 TIMES DAILY
Qty: 30 TABLET | Refills: 0 | Status: SHIPPED | OUTPATIENT
Start: 2024-01-02 | End: 2024-01-02 | Stop reason: SDUPTHER

## 2024-01-02 ASSESSMENT — ENCOUNTER SYMPTOMS
COUGH: 1
CHILLS: 1
SHORTNESS OF BREATH: 0
FEVER: 0

## 2024-01-02 NOTE — PROGRESS NOTES
Subjective   Patient ID: Rafael Singh is a 53 y.o. male who presents for positive covid. Sx's onset on 12/30/23 with a tickle in his throat. Sx's then progressed with sinus congestion, cough, chills and body aches. Home covid test positive. Fully vaccinated with one booster. Taking OTC Clarisse-Selter plus.     Last 3-4 days   Sinus congestion     Productive cough   Has an inhaler            Review of Systems   Constitutional:  Positive for chills. Negative for fever.   HENT:  Positive for congestion.    Respiratory:  Positive for cough. Negative for shortness of breath.        Objective   Temp 37.1 °C (98.7 °F)     Physical Exam    Pt alert and oreinted nontoxic   No resp distress       Assessment/Plan   Problem List Items Addressed This Visit    None  Visit Diagnoses         Codes    COVID-19    -  Primary U07.1    Relevant Medications    nirmatrelvir-ritonavir (Paxlovid) 300 mg (150 mg x 2)-100 mg tablet therapy pack

## 2024-01-02 NOTE — PATIENT INSTRUCTIONS
discussed the symptoms of  COVID with the patient  :    Symptoms include: Fever, shortness of breath , cough, diarrhea, loss of sense of smell, muscle aches  Generally treatment is symptomatic: Tylenol, push fluids, rest  Call the office or seek treatment with worsening symptoms.  If has shortness of breath worsens recommend going to ER  Older folks and people at higher risk may benefit from antiviral medication  The current recommendation is to quarantine for 5 days and then if you are fever free you can come out of quarantine but you need to continue to mask for 5 days  Similar recommendations for close household contacts

## 2024-01-25 DIAGNOSIS — M17.0 ARTHRITIS OF BOTH KNEES: ICD-10-CM

## 2024-01-25 RX ORDER — HYALURONATE SODIUM, STABILIZED 60 MG/3 ML
SYRINGE (ML) INTRAARTICULAR
Qty: 6 ML | Refills: 0 | Status: SHIPPED | OUTPATIENT
Start: 2024-01-25

## 2024-02-07 ENCOUNTER — TELEPHONE (OUTPATIENT)
Dept: SPORTS MEDICINE | Facility: CLINIC | Age: 54
End: 2024-02-07
Payer: COMMERCIAL
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
## 2024-04-09 ENCOUNTER — HOSPITAL ENCOUNTER (OUTPATIENT)
Dept: RADIOLOGY | Facility: EXTERNAL LOCATION | Age: 54
Discharge: HOME | End: 2024-04-09

## 2024-04-09 ENCOUNTER — OFFICE VISIT (OUTPATIENT)
Dept: ORTHOPEDIC SURGERY | Facility: HOSPITAL | Age: 54
End: 2024-04-09
Payer: COMMERCIAL

## 2024-04-09 DIAGNOSIS — M17.0 ARTHRITIS OF BOTH KNEES: Primary | ICD-10-CM

## 2024-04-09 PROCEDURE — 20610 DRAIN/INJ JOINT/BURSA W/O US: CPT | Mod: 50 | Performed by: STUDENT IN AN ORGANIZED HEALTH CARE EDUCATION/TRAINING PROGRAM

## 2024-04-09 PROCEDURE — 2500000005 HC RX 250 GENERAL PHARMACY W/O HCPCS: Performed by: STUDENT IN AN ORGANIZED HEALTH CARE EDUCATION/TRAINING PROGRAM

## 2024-04-09 PROCEDURE — 20611 DRAIN/INJ JOINT/BURSA W/US: CPT | Performed by: STUDENT IN AN ORGANIZED HEALTH CARE EDUCATION/TRAINING PROGRAM

## 2024-04-09 PROCEDURE — 2500000004 HC RX 250 GENERAL PHARMACY W/ HCPCS (ALT 636 FOR OP/ED): Mod: JZ | Performed by: STUDENT IN AN ORGANIZED HEALTH CARE EDUCATION/TRAINING PROGRAM

## 2024-04-09 PROCEDURE — 20611 DRAIN/INJ JOINT/BURSA W/US: CPT | Mod: 50 | Performed by: STUDENT IN AN ORGANIZED HEALTH CARE EDUCATION/TRAINING PROGRAM

## 2024-04-09 RX ORDER — LIDOCAINE HYDROCHLORIDE 10 MG/ML
2 INJECTION, SOLUTION EPIDURAL; INFILTRATION; INTRACAUDAL; PERINEURAL
Status: COMPLETED | OUTPATIENT
Start: 2024-04-09 | End: 2024-04-09

## 2024-04-09 RX ADMIN — Medication 60 MG: at 16:17

## 2024-04-09 RX ADMIN — LIDOCAINE HYDROCHLORIDE 2 ML: 10 INJECTION, SOLUTION EPIDURAL; INFILTRATION; INTRACAUDAL; PERINEURAL at 16:17

## 2024-04-09 NOTE — PROGRESS NOTES
Large Jt Asp/Inj: bilateral knee on 4/9/2024 4:17 PM  Details: ultrasound-guided  Medications (Right): 60 mg sodium hyaluronate 60 mg/3 mL; 2 mL lidocaine PF 10 mg/mL (1 %)  Medications (Left): 2 mL lidocaine PF 10 mg/mL (1 %); 60 mg sodium hyaluronate 60 mg/3 mL    Pre-Procedure Diagnosis: left and right knee pain and effusion, left and right knee OA  Post-Procedure Diagnosis: left and right knee pain and effusion, left and right knee OA    Procedure: US-guided right knee aspiration and left and right knee viscosupplementation injection (Durolane)    History of Present Illness: Rafael Singh is a pleasant 53 y.o. male with bilateral knee pain secondary to osteoarthritis who is here for the above procedure for improved pain control.    Medications and allergies were reviewed with the patient. No contraindications were identified.    Informed Consent:   Following denial of allergy and review of potential side effects and complications including but not necessarily limited to infection, allergic reaction, local tissue breakdown, injury to soft tissue and/or nerves and seizure, the patient indicated understanding and agreed to proceed. Written consent to treatment was obtained and the patient verbalized consent for the procedure.    Procedural Details  The use of direct ultrasound visualization of the needle (rather than a non-guided injection) was required to increase patient safety by excluding inadvertent intramuscular or intratendinous placement and minimizing bleeding by avoiding osteochondral or vascular injury from the needle.  Additionally, the increased accuracy of placement may increase clinical effectiveness and will allow higher diagnostic specificity when evaluating effectiveness of this injection. All ultrasound images were annotated and saved on the performing ultrasound machine and uploaded into PACS.    Using ultrasound, a pre-scan of the region was performed to identify the target structure.     The  area was prepped with chlorhexidine, then re-examined using the same transducer, a sterile ultrasound transducer cover, and sterile ultrasound gel.    Procedural pause conducted to verify: Correct patient identity, procedure to be performed, and as applicable, correct side and site, correct patient position, and availability of implants, special equipment, or special requirements.    Procedure: RIGHT  Transducer: Linear array transducer.  Patient position: Supine with the knee bent to 30 degrees.   Localization process: The suprapatellar recess was localized in a short axis view.   Local anesthesia: Local anesthesia was obtained with 2 cc of 1% lidocaine.   Needle: A 27-gauge, 1.5-inch needle was used for local anesthesia and a 18-gauge, 1.5-inch needle was used for the aspiration/injectate.   Approach: A lateral to medial, in plane, approach was used to guide the needle tip into the left suprapatellar recess deep to the quadriceps tendon and superficial to the prefemoral fat pad.   Injection/Aspiration: 15 ml of normal appearing synovial fluid were aspirated. 1 vial of Durolane (3mL, 20mg/mL) was injected into the right knee joint without complication.     Procedure: LEFT  Transducer: Linear array transducer.  Patient position: Supine with the knee bent to 30 degrees.   Localization process: The suprapatellar recess was localized in a short axis view.   Local anesthesia: Local anesthesia was obtained with 2 cc of 1% lidocaine.   Needle: A 27-gauge, 1.5-inch needle was used for local anesthesia and a 21-gauge, 1.5-inch needle was used for the injectate.   Approach: A lateral to medial, in plane, approach was used to guide the needle tip into the left suprapatellar recess deep to the quadriceps tendon and superficial to the prefemoral fat pad.   Injection/Aspiration: 1 vial of Durolane (3mL, 20mg/mL) was injected into the left knee joint without complication.     Post-procedural care: The patient tolerated the  procedure well. The patient was asked to ice for improved pain control and avoid submerging the area in water for the next 48 hours to help reduce the risk of infection. The patient was instructed to call the office immediately if there are any questions or concerns.    PATIENT EDUCATION:  Education of the diagnosis and treatment plan was discussed at today's appointment. A learning needs assessment was performed. The patient demonstrated understanding.

## 2024-09-07 ENCOUNTER — TELEPHONE (OUTPATIENT)
Dept: PRIMARY CARE | Facility: CLINIC | Age: 54
End: 2024-09-07
Payer: COMMERCIAL

## 2024-09-07 DIAGNOSIS — R91.1 LUNG NODULE: Primary | ICD-10-CM

## 2024-09-07 NOTE — TELEPHONE ENCOUNTER
MD Janet Nugent MA  Please call patient, he is due for repeat CT chest, orders in the computer    Spoke with pt. CA

## 2024-09-07 NOTE — TELEPHONE ENCOUNTER
----- Message from Marti Bansal sent at 9/7/2024 10:58 AM EDT -----  Please call patient, he is due for repeat CT chest, orders in the computer  ----- Message -----  From: Marti Bansal MD  Sent: 8/19/2024  12:00 AM EDT  To: Marti Bansal MD    CT chest

## 2024-09-30 ENCOUNTER — HOSPITAL ENCOUNTER (OUTPATIENT)
Dept: RADIOLOGY | Facility: HOSPITAL | Age: 54
Discharge: HOME | End: 2024-09-30
Payer: COMMERCIAL

## 2024-09-30 DIAGNOSIS — R91.1 LUNG NODULE: ICD-10-CM

## 2024-09-30 PROCEDURE — 71250 CT THORAX DX C-: CPT

## 2024-09-30 PROCEDURE — 71250 CT THORAX DX C-: CPT | Performed by: RADIOLOGY

## 2025-01-10 ENCOUNTER — OFFICE VISIT (OUTPATIENT)
Dept: ORTHOPEDIC SURGERY | Facility: HOSPITAL | Age: 55
End: 2025-01-10
Payer: COMMERCIAL

## 2025-01-10 DIAGNOSIS — M77.11 LATERAL EPICONDYLITIS OF RIGHT ELBOW: Primary | ICD-10-CM

## 2025-01-10 PROCEDURE — 2500000004 HC RX 250 GENERAL PHARMACY W/ HCPCS (ALT 636 FOR OP/ED): Performed by: ORTHOPAEDIC SURGERY

## 2025-01-10 PROCEDURE — 99203 OFFICE O/P NEW LOW 30 MIN: CPT | Performed by: ORTHOPAEDIC SURGERY

## 2025-01-10 PROCEDURE — 1036F TOBACCO NON-USER: CPT | Performed by: ORTHOPAEDIC SURGERY

## 2025-01-10 PROCEDURE — 99213 OFFICE O/P EST LOW 20 MIN: CPT | Mod: 25 | Performed by: ORTHOPAEDIC SURGERY

## 2025-01-10 PROCEDURE — 76942 ECHO GUIDE FOR BIOPSY: CPT | Performed by: ORTHOPAEDIC SURGERY

## 2025-01-10 RX ORDER — METHYLPREDNISOLONE ACETATE 40 MG/ML
30 INJECTION, SUSPENSION INTRA-ARTICULAR; INTRALESIONAL; INTRAMUSCULAR; SOFT TISSUE
Status: COMPLETED | OUTPATIENT
Start: 2025-01-10 | End: 2025-01-10

## 2025-01-10 RX ORDER — LIDOCAINE HYDROCHLORIDE 10 MG/ML
1 INJECTION, SOLUTION INFILTRATION; PERINEURAL
Status: COMPLETED | OUTPATIENT
Start: 2025-01-10 | End: 2025-01-10

## 2025-01-10 RX ADMIN — METHYLPREDNISOLONE ACETATE 30 MG: 40 INJECTION, SUSPENSION INTRA-ARTICULAR; INTRALESIONAL; INTRAMUSCULAR; SOFT TISSUE at 18:08

## 2025-01-10 RX ADMIN — LIDOCAINE HYDROCHLORIDE 1 ML: 10 INJECTION, SOLUTION INFILTRATION; PERINEURAL at 18:08

## 2025-01-10 ASSESSMENT — ENCOUNTER SYMPTOMS
CHILLS: 0
BRUISES/BLEEDS EASILY: 0
NEUROLOGICAL NEGATIVE: 1
FATIGUE: 0
SHORTNESS OF BREATH: 0
WHEEZING: 0
FEVER: 0

## 2025-01-10 ASSESSMENT — PAIN - FUNCTIONAL ASSESSMENT: PAIN_FUNCTIONAL_ASSESSMENT: 0-10

## 2025-01-10 ASSESSMENT — PAIN SCALES - GENERAL: PAINLEVEL_OUTOF10: 7
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
## 2025-02-04 ASSESSMENT — ENCOUNTER SYMPTOMS
COUGH: 1
WHEEZING: 0
RHINORRHEA: 0
WEIGHT LOSS: 0
HEMOPTYSIS: 0
HEARTBURN: 0
FEVER: 0
CHILLS: 1
SHORTNESS OF BREATH: 0
HEADACHES: 1
SWEATS: 0
MYALGIAS: 0
SORE THROAT: 1

## 2025-02-05 ENCOUNTER — OFFICE VISIT (OUTPATIENT)
Dept: PRIMARY CARE | Facility: CLINIC | Age: 55
End: 2025-02-05
Payer: COMMERCIAL

## 2025-02-05 VITALS
HEIGHT: 69 IN | OXYGEN SATURATION: 94 % | WEIGHT: 190 LBS | TEMPERATURE: 98.3 F | BODY MASS INDEX: 28.14 KG/M2 | HEART RATE: 76 BPM | SYSTOLIC BLOOD PRESSURE: 123 MMHG | DIASTOLIC BLOOD PRESSURE: 82 MMHG

## 2025-02-05 DIAGNOSIS — R05.1 ACUTE COUGH: Primary | ICD-10-CM

## 2025-02-05 DIAGNOSIS — J10.1 INFLUENZA A: ICD-10-CM

## 2025-02-05 PROBLEM — S16.1XXA CERVICAL STRAIN: Status: RESOLVED | Noted: 2023-10-13 | Resolved: 2025-02-05

## 2025-02-05 PROBLEM — M47.814 THORACIC SPONDYLOSIS: Status: RESOLVED | Noted: 2023-10-13 | Resolved: 2025-02-05

## 2025-02-05 PROBLEM — B36.0 PITYRIASIS VERSICOLOR: Status: RESOLVED | Noted: 2023-06-19 | Resolved: 2025-02-05

## 2025-02-05 PROBLEM — M21.70 LEG LENGTH DISCREPANCY: Status: RESOLVED | Noted: 2023-10-13 | Resolved: 2025-02-05

## 2025-02-05 PROBLEM — R39.12 WEAK URINARY STREAM: Status: RESOLVED | Noted: 2023-06-19 | Resolved: 2025-02-05

## 2025-02-05 PROBLEM — M25.511 RIGHT SHOULDER PAIN: Status: RESOLVED | Noted: 2023-06-19 | Resolved: 2025-02-05

## 2025-02-05 PROBLEM — N50.89 LUMP IN THE TESTICLE: Status: RESOLVED | Noted: 2023-06-19 | Resolved: 2025-02-05

## 2025-02-05 PROBLEM — M48.061 FORAMINAL STENOSIS OF LUMBAR REGION: Status: RESOLVED | Noted: 2023-10-13 | Resolved: 2025-02-05

## 2025-02-05 LAB
POC RAPID INFLUENZA A: POSITIVE
POC RAPID INFLUENZA B: NEGATIVE
POC SARS-COV-2 AG BINAX: NORMAL

## 2025-02-05 PROCEDURE — 87804 INFLUENZA ASSAY W/OPTIC: CPT | Performed by: FAMILY MEDICINE

## 2025-02-05 PROCEDURE — 99213 OFFICE O/P EST LOW 20 MIN: CPT | Performed by: FAMILY MEDICINE

## 2025-02-05 PROCEDURE — 1036F TOBACCO NON-USER: CPT | Performed by: FAMILY MEDICINE

## 2025-02-05 PROCEDURE — 87811 SARS-COV-2 COVID19 W/OPTIC: CPT | Performed by: FAMILY MEDICINE

## 2025-02-05 PROCEDURE — 3008F BODY MASS INDEX DOCD: CPT | Performed by: FAMILY MEDICINE

## 2025-02-05 RX ORDER — OSELTAMIVIR PHOSPHATE 75 MG/1
75 CAPSULE ORAL 2 TIMES DAILY
Qty: 10 CAPSULE | Refills: 0 | Status: SHIPPED | OUTPATIENT
Start: 2025-02-05 | End: 2025-02-10

## 2025-02-05 RX ORDER — DULAGLUTIDE 4.5 MG/.5ML
INJECTION, SOLUTION SUBCUTANEOUS
COMMUNITY
Start: 2025-01-22

## 2025-02-05 ASSESSMENT — ENCOUNTER SYMPTOMS
SORE THROAT: 1
SHORTNESS OF BREATH: 0
MYALGIAS: 0
COUGH: 1
RHINORRHEA: 0
CHILLS: 1
WEIGHT LOSS: 0
FEVER: 0
HEADACHES: 1
WHEEZING: 0
HEMOPTYSIS: 0
HEARTBURN: 0
SWEATS: 0

## 2025-02-05 ASSESSMENT — PATIENT HEALTH QUESTIONNAIRE - PHQ9
2. FEELING DOWN, DEPRESSED OR HOPELESS: NOT AT ALL
SUM OF ALL RESPONSES TO PHQ9 QUESTIONS 1 AND 2: 0
1. LITTLE INTEREST OR PLEASURE IN DOING THINGS: NOT AT ALL

## 2025-02-05 NOTE — PROGRESS NOTES
"Subjective   Patient ID: Rafael Singh is a 54 y.o. male who presents for Sick Visit (Rafael is here today for same day sick visit with c/o chills, body aches, sinus and chest congestion, persistent cough severe headache x3 days. /Pt taking OTC mucinex with no relief. ).    3 days   Cough   Chills fevers   Headaches     OTC meds minimal relief       On trulicity for weight loss    Cough  This is a new problem. The current episode started yesterday. The problem has been gradually worsening. The problem occurs hourly. The cough is Non-productive. Associated symptoms include chills, headaches, nasal congestion and a sore throat. Pertinent negatives include no chest pain, ear congestion, ear pain, fever, heartburn, hemoptysis, myalgias, postnasal drip, rash, rhinorrhea, shortness of breath, sweats, weight loss or wheezing. Nothing aggravates the symptoms. Risk factors for lung disease include smoking/tobacco exposure.        Review of Systems   Constitutional:  Positive for chills. Negative for fever and weight loss.   HENT:  Positive for sore throat. Negative for ear pain, postnasal drip and rhinorrhea.    Respiratory:  Positive for cough. Negative for hemoptysis, shortness of breath and wheezing.    Cardiovascular:  Negative for chest pain.   Gastrointestinal:  Negative for heartburn.   Musculoskeletal:  Negative for myalgias.   Skin:  Negative for rash.   Neurological:  Positive for headaches.       Objective   /82 (BP Location: Left arm, Patient Position: Sitting)   Pulse 76   Temp 36.8 °C (98.3 °F) (Oral)   Ht 1.753 m (5' 9\")   Wt 86.2 kg (190 lb)   SpO2 94%   BMI 28.06 kg/m²     Physical Exam  Constitutional:       Appearance: Normal appearance.   HENT:      Head: Normocephalic and atraumatic.      Right Ear: Tympanic membrane and ear canal normal.      Left Ear: Tympanic membrane and ear canal normal.      Nose: No nasal deformity.      Right Sinus: No maxillary sinus tenderness or frontal sinus " HPI:    Patient ID: Galo Mas is a 36year old female.     HPI    Review of Systems         Current Outpatient Medications   Medication Sig Dispense Refill   • IBUPROFEN OR        Allergies:Not on File   PHYSICAL EXAM:   Physical Exam           ASSESSMEN tenderness.      Left Sinus: No maxillary sinus tenderness or frontal sinus tenderness.      Mouth/Throat:      Mouth: Mucous membranes are moist. No oral lesions.      Tongue: No lesions.      Pharynx: Oropharynx is clear.   Cardiovascular:      Rate and Rhythm: Normal rate and regular rhythm.   Pulmonary:      Effort: Pulmonary effort is normal.      Breath sounds: Normal breath sounds.   Musculoskeletal:      Cervical back: No tenderness.   Lymphadenopathy:      Cervical: No cervical adenopathy.   Neurological:      Mental Status: He is alert.         Assessment/Plan   Problem List Items Addressed This Visit    None  Visit Diagnoses         Codes    Acute cough    -  Primary R05.1    Relevant Orders    POCT Influenza A/B manually resulted (Completed)    POCT BinaxNOW Covid-19 Ag Card manually resulted (Completed)    Influenza A     J10.1    Relevant Medications    oseltamivir (Tamiflu) 75 mg capsule    Other Relevant Orders    POCT Influenza A/B manually resulted (Completed)    POCT BinaxNOW Covid-19 Ag Card manually resulted (Completed)

## 2025-02-05 NOTE — PATIENT INSTRUCTIONS
You may take over-the-counter medications as needed for symptom relief.  Call the office if your symptoms worsen such as high fever and worsening cough or increase in symptoms.     Follow up if symptoms worsen or persist.     Influenza is caused by a virus.  That usually causes symptoms such as fevers, muscle aches, cough.  You can take over-the-counter medications for symptom relief such as Tylenol, anti-inflammatories or cough and cold medications.  If the influenza is diagnosed within the first 72 hours of onset of symptoms we can use an antiviral.  The antiviral does not completely resolve the infection, it only diminishes  the intensity of the symptoms and length of time that you are  ill.  Even with an antiviral, viruses require your body to fight it off.   Influenza is very contagious and you're considered contagious up to 7 days after the onset of your symptoms.   You should avoid close contact with others, keep your mouth covered with coughing , Usually we recommend avoiding work or school until you are beyond that 7 day period.   Sometimes you can get secondary infections from influenza such as a secondary pneumonia or sinus infection.  If you find your symptoms suddenly worsen such as worsening fevers or you're not improving let your doctor know.    It is important that you stay well hydrated, drink regular fluids and get adequate rest.        Follow-up for physical  
No lesions; no rash

## 2025-02-22 ENCOUNTER — OFFICE VISIT (OUTPATIENT)
Dept: PRIMARY CARE | Facility: CLINIC | Age: 55
End: 2025-02-22
Payer: COMMERCIAL

## 2025-02-22 ENCOUNTER — APPOINTMENT (OUTPATIENT)
Dept: RADIOLOGY | Facility: HOSPITAL | Age: 55
End: 2025-02-22
Payer: COMMERCIAL

## 2025-02-22 ENCOUNTER — HOSPITAL ENCOUNTER (EMERGENCY)
Facility: HOSPITAL | Age: 55
End: 2025-02-22
Attending: EMERGENCY MEDICINE | Admitting: INTERNAL MEDICINE
Payer: COMMERCIAL

## 2025-02-22 VITALS
TEMPERATURE: 97.5 F | BODY MASS INDEX: 28.14 KG/M2 | HEIGHT: 69 IN | WEIGHT: 190 LBS | OXYGEN SATURATION: 97 % | DIASTOLIC BLOOD PRESSURE: 85 MMHG | HEART RATE: 71 BPM | SYSTOLIC BLOOD PRESSURE: 129 MMHG

## 2025-02-22 DIAGNOSIS — R20.2 PARESTHESIA OF BOTH LEGS: ICD-10-CM

## 2025-02-22 DIAGNOSIS — R20.0 SADDLE ANESTHESIA: ICD-10-CM

## 2025-02-22 DIAGNOSIS — G37.3 TRANSVERSE MYELITIS (MULTI): Primary | ICD-10-CM

## 2025-02-22 DIAGNOSIS — R39.9 SYMPTOM INVOLVING BLADDER: ICD-10-CM

## 2025-02-22 DIAGNOSIS — M54.16 LUMBAR RADICULOPATHY: Primary | ICD-10-CM

## 2025-02-22 DIAGNOSIS — B00.9 HERPES SIMPLEX: ICD-10-CM

## 2025-02-22 LAB
ALBUMIN SERPL BCP-MCNC: 4.6 G/DL (ref 3.4–5)
ALP SERPL-CCNC: 68 U/L (ref 33–120)
ALT SERPL W P-5'-P-CCNC: 21 U/L (ref 10–52)
ANION GAP SERPL CALC-SCNC: 15 MMOL/L (ref 10–20)
APPEARANCE UR: CLEAR
AST SERPL W P-5'-P-CCNC: 18 U/L (ref 9–39)
BASOPHILS # BLD AUTO: 0.07 X10*3/UL (ref 0–0.1)
BASOPHILS NFR BLD AUTO: 1 %
BILIRUB SERPL-MCNC: 0.4 MG/DL (ref 0–1.2)
BILIRUB UR STRIP.AUTO-MCNC: NEGATIVE MG/DL
BUN SERPL-MCNC: 11 MG/DL (ref 6–23)
CALCIUM SERPL-MCNC: 9.5 MG/DL (ref 8.6–10.3)
CHLORIDE SERPL-SCNC: 103 MMOL/L (ref 98–107)
CO2 SERPL-SCNC: 26 MMOL/L (ref 21–32)
COLOR UR: NORMAL
CREAT SERPL-MCNC: 0.84 MG/DL (ref 0.5–1.3)
CRP SERPL-MCNC: 0.31 MG/DL
EGFRCR SERPLBLD CKD-EPI 2021: >90 ML/MIN/1.73M*2
EOSINOPHIL # BLD AUTO: 0.15 X10*3/UL (ref 0–0.7)
EOSINOPHIL NFR BLD AUTO: 2.2 %
ERYTHROCYTE [DISTWIDTH] IN BLOOD BY AUTOMATED COUNT: 13.2 % (ref 11.5–14.5)
ERYTHROCYTE [SEDIMENTATION RATE] IN BLOOD BY WESTERGREN METHOD: 8 MM/H (ref 0–20)
FLUAV RNA RESP QL NAA+PROBE: NOT DETECTED
FLUBV RNA RESP QL NAA+PROBE: NOT DETECTED
GLUCOSE SERPL-MCNC: 106 MG/DL (ref 74–99)
GLUCOSE UR STRIP.AUTO-MCNC: NORMAL MG/DL
HCT VFR BLD AUTO: 43.9 % (ref 41–52)
HGB BLD-MCNC: 14.8 G/DL (ref 13.5–17.5)
HIV 1+2 AB+HIV1 P24 AG SERPL QL IA: NONREACTIVE
HOLD SPECIMEN: NORMAL
IMM GRANULOCYTES # BLD AUTO: 0.03 X10*3/UL (ref 0–0.7)
IMM GRANULOCYTES NFR BLD AUTO: 0.4 % (ref 0–0.9)
KETONES UR STRIP.AUTO-MCNC: NEGATIVE MG/DL
LEUKOCYTE ESTERASE UR QL STRIP.AUTO: NEGATIVE
LYMPHOCYTES # BLD AUTO: 2.61 X10*3/UL (ref 1.2–4.8)
LYMPHOCYTES NFR BLD AUTO: 37.5 %
MCH RBC QN AUTO: 28.6 PG (ref 26–34)
MCHC RBC AUTO-ENTMCNC: 33.7 G/DL (ref 32–36)
MCV RBC AUTO: 85 FL (ref 80–100)
MONOCYTES # BLD AUTO: 0.59 X10*3/UL (ref 0.1–1)
MONOCYTES NFR BLD AUTO: 8.5 %
NEUTROPHILS # BLD AUTO: 3.51 X10*3/UL (ref 1.2–7.7)
NEUTROPHILS NFR BLD AUTO: 50.4 %
NITRITE UR QL STRIP.AUTO: NEGATIVE
NRBC BLD-RTO: 0 /100 WBCS (ref 0–0)
PH UR STRIP.AUTO: 5.5 [PH]
PLATELET # BLD AUTO: 341 X10*3/UL (ref 150–450)
POTASSIUM SERPL-SCNC: 4.5 MMOL/L (ref 3.5–5.3)
PROT SERPL-MCNC: 7.7 G/DL (ref 6.4–8.2)
PROT UR STRIP.AUTO-MCNC: NEGATIVE MG/DL
RBC # BLD AUTO: 5.17 X10*6/UL (ref 4.5–5.9)
RBC # UR STRIP.AUTO: NEGATIVE MG/DL
RSV RNA RESP QL NAA+PROBE: NOT DETECTED
SARS-COV-2 RNA RESP QL NAA+PROBE: NOT DETECTED
SODIUM SERPL-SCNC: 139 MMOL/L (ref 136–145)
SP GR UR STRIP.AUTO: 1.01
UROBILINOGEN UR STRIP.AUTO-MCNC: NORMAL MG/DL
VIT B12 SERPL-MCNC: 601 PG/ML (ref 211–911)
WBC # BLD AUTO: 7 X10*3/UL (ref 4.4–11.3)

## 2025-02-22 PROCEDURE — 87529 HSV DNA AMP PROBE: CPT | Mod: GEALAB

## 2025-02-22 PROCEDURE — 3008F BODY MASS INDEX DOCD: CPT | Performed by: FAMILY MEDICINE

## 2025-02-22 PROCEDURE — 82525 ASSAY OF COPPER: CPT

## 2025-02-22 PROCEDURE — 96375 TX/PRO/DX INJ NEW DRUG ADDON: CPT

## 2025-02-22 PROCEDURE — 72148 MRI LUMBAR SPINE W/O DYE: CPT

## 2025-02-22 PROCEDURE — 1100000001 HC PRIVATE ROOM DAILY

## 2025-02-22 PROCEDURE — 2500000004 HC RX 250 GENERAL PHARMACY W/ HCPCS (ALT 636 FOR OP/ED)

## 2025-02-22 PROCEDURE — A9575 INJ GADOTERATE MEGLUMI 0.1ML: HCPCS | Performed by: PHYSICIAN ASSISTANT

## 2025-02-22 PROCEDURE — 36415 COLL VENOUS BLD VENIPUNCTURE: CPT

## 2025-02-22 PROCEDURE — 86140 C-REACTIVE PROTEIN: CPT | Performed by: PHYSICIAN ASSISTANT

## 2025-02-22 PROCEDURE — 87632 RESP VIRUS 6-11 TARGETS: CPT

## 2025-02-22 PROCEDURE — 82306 VITAMIN D 25 HYDROXY: CPT | Mod: GEALAB

## 2025-02-22 PROCEDURE — 80053 COMPREHEN METABOLIC PANEL: CPT | Performed by: PHYSICIAN ASSISTANT

## 2025-02-22 PROCEDURE — 81003 URINALYSIS AUTO W/O SCOPE: CPT | Performed by: PHYSICIAN ASSISTANT

## 2025-02-22 PROCEDURE — 72148 MRI LUMBAR SPINE W/O DYE: CPT | Performed by: RADIOLOGY

## 2025-02-22 PROCEDURE — 99285 EMERGENCY DEPT VISIT HI MDM: CPT | Mod: 25 | Performed by: EMERGENCY MEDICINE

## 2025-02-22 PROCEDURE — 87389 HIV-1 AG W/HIV-1&-2 AB AG IA: CPT | Mod: GEALAB

## 2025-02-22 PROCEDURE — 72157 MRI CHEST SPINE W/O & W/DYE: CPT | Performed by: STUDENT IN AN ORGANIZED HEALTH CARE EDUCATION/TRAINING PROGRAM

## 2025-02-22 PROCEDURE — 86038 ANTINUCLEAR ANTIBODIES: CPT | Mod: GEALAB

## 2025-02-22 PROCEDURE — 82607 VITAMIN B-12: CPT | Mod: GEALAB

## 2025-02-22 PROCEDURE — 85025 COMPLETE CBC W/AUTO DIFF WBC: CPT | Performed by: PHYSICIAN ASSISTANT

## 2025-02-22 PROCEDURE — 74177 CT ABD & PELVIS W/CONTRAST: CPT | Performed by: RADIOLOGY

## 2025-02-22 PROCEDURE — 99214 OFFICE O/P EST MOD 30 MIN: CPT | Performed by: FAMILY MEDICINE

## 2025-02-22 PROCEDURE — 74177 CT ABD & PELVIS W/CONTRAST: CPT

## 2025-02-22 PROCEDURE — 87799 DETECT AGENT NOS DNA QUANT: CPT | Mod: GEALAB

## 2025-02-22 PROCEDURE — 72157 MRI CHEST SPINE W/O & W/DYE: CPT

## 2025-02-22 PROCEDURE — 51798 US URINE CAPACITY MEASURE: CPT

## 2025-02-22 PROCEDURE — 71260 CT THORAX DX C+: CPT | Performed by: RADIOLOGY

## 2025-02-22 PROCEDURE — 96374 THER/PROPH/DIAG INJ IV PUSH: CPT

## 2025-02-22 PROCEDURE — 1036F TOBACCO NON-USER: CPT | Performed by: FAMILY MEDICINE

## 2025-02-22 PROCEDURE — 85652 RBC SED RATE AUTOMATED: CPT | Performed by: PHYSICIAN ASSISTANT

## 2025-02-22 PROCEDURE — 2550000001 HC RX 255 CONTRASTS: Performed by: PHYSICIAN ASSISTANT

## 2025-02-22 PROCEDURE — 2500000004 HC RX 250 GENERAL PHARMACY W/ HCPCS (ALT 636 FOR OP/ED): Performed by: PHYSICIAN ASSISTANT

## 2025-02-22 PROCEDURE — 2500000001 HC RX 250 WO HCPCS SELF ADMINISTERED DRUGS (ALT 637 FOR MEDICARE OP)

## 2025-02-22 PROCEDURE — 36415 COLL VENOUS BLD VENIPUNCTURE: CPT | Performed by: PHYSICIAN ASSISTANT

## 2025-02-22 PROCEDURE — 87637 SARSCOV2&INF A&B&RSV AMP PRB: CPT

## 2025-02-22 RX ORDER — LORAZEPAM 2 MG/ML
1 INJECTION INTRAMUSCULAR ONCE
Status: COMPLETED | OUTPATIENT
Start: 2025-02-22 | End: 2025-02-22

## 2025-02-22 RX ORDER — CETIRIZINE HYDROCHLORIDE 10 MG/1
10 TABLET ORAL DAILY
Status: DISCONTINUED | OUTPATIENT
Start: 2025-02-22 | End: 2025-02-24 | Stop reason: HOSPADM

## 2025-02-22 RX ORDER — PANTOPRAZOLE SODIUM 20 MG/1
20 TABLET, DELAYED RELEASE ORAL
Status: DISCONTINUED | OUTPATIENT
Start: 2025-02-23 | End: 2025-02-24 | Stop reason: HOSPADM

## 2025-02-22 RX ORDER — MONTELUKAST SODIUM 10 MG/1
10 TABLET ORAL NIGHTLY
Status: DISCONTINUED | OUTPATIENT
Start: 2025-02-22 | End: 2025-02-24 | Stop reason: HOSPADM

## 2025-02-22 RX ORDER — MONTELUKAST SODIUM 10 MG/1
10 TABLET ORAL NIGHTLY
Status: ON HOLD | COMMUNITY

## 2025-02-22 RX ORDER — GADOTERATE MEGLUMINE 376.9 MG/ML
0.2 INJECTION INTRAVENOUS
Status: COMPLETED | OUTPATIENT
Start: 2025-02-22 | End: 2025-02-22

## 2025-02-22 RX ORDER — ONDANSETRON 4 MG/1
4 TABLET, FILM COATED ORAL EVERY 8 HOURS PRN
Status: DISCONTINUED | OUTPATIENT
Start: 2025-02-22 | End: 2025-02-24 | Stop reason: HOSPADM

## 2025-02-22 RX ORDER — DEXAMETHASONE SODIUM PHOSPHATE 10 MG/ML
10 INJECTION INTRAMUSCULAR; INTRAVENOUS ONCE
Status: COMPLETED | OUTPATIENT
Start: 2025-02-22 | End: 2025-02-22

## 2025-02-22 RX ORDER — ACYCLOVIR 200 MG/1
800 CAPSULE ORAL 3 TIMES DAILY
Status: DISCONTINUED | OUTPATIENT
Start: 2025-02-22 | End: 2025-02-24

## 2025-02-22 RX ORDER — LORAZEPAM 2 MG/ML
1 INJECTION INTRAMUSCULAR ONCE
Status: DISCONTINUED | OUTPATIENT
Start: 2025-02-22 | End: 2025-02-24 | Stop reason: HOSPADM

## 2025-02-22 RX ORDER — ONDANSETRON HYDROCHLORIDE 2 MG/ML
4 INJECTION, SOLUTION INTRAVENOUS EVERY 8 HOURS PRN
Status: DISCONTINUED | OUTPATIENT
Start: 2025-02-22 | End: 2025-02-24 | Stop reason: HOSPADM

## 2025-02-22 RX ORDER — ACETAMINOPHEN 650 MG/1
650 SUPPOSITORY RECTAL EVERY 4 HOURS PRN
Status: DISCONTINUED | OUTPATIENT
Start: 2025-02-22 | End: 2025-02-24 | Stop reason: HOSPADM

## 2025-02-22 RX ORDER — ACYCLOVIR 400 MG/1
800 TABLET ORAL 3 TIMES DAILY
Qty: 24 TABLET | Refills: 2 | Status: ON HOLD | OUTPATIENT
Start: 2025-02-22 | End: 2025-02-24

## 2025-02-22 RX ORDER — ACETAMINOPHEN 160 MG/5ML
650 SOLUTION ORAL EVERY 4 HOURS PRN
Status: DISCONTINUED | OUTPATIENT
Start: 2025-02-22 | End: 2025-02-24 | Stop reason: HOSPADM

## 2025-02-22 RX ORDER — ACETAMINOPHEN 325 MG/1
650 TABLET ORAL EVERY 4 HOURS PRN
Status: DISCONTINUED | OUTPATIENT
Start: 2025-02-22 | End: 2025-02-24 | Stop reason: HOSPADM

## 2025-02-22 RX ADMIN — DEXAMETHASONE SODIUM PHOSPHATE 10 MG: 10 INJECTION, SOLUTION INTRAMUSCULAR; INTRAVENOUS at 13:39

## 2025-02-22 RX ADMIN — LORAZEPAM 1 MG: 2 INJECTION INTRAMUSCULAR; INTRAVENOUS at 13:39

## 2025-02-22 RX ADMIN — ACYCLOVIR 800 MG: 200 CAPSULE ORAL at 20:20

## 2025-02-22 RX ADMIN — IOHEXOL 75 ML: 350 INJECTION, SOLUTION INTRAVENOUS at 13:28

## 2025-02-22 RX ADMIN — GADOTERATE MEGLUMINE 17 ML: 376.9 INJECTION INTRAVENOUS at 14:26

## 2025-02-22 RX ADMIN — DEXTROSE MONOHYDRATE 950 MG: 50 INJECTION, SOLUTION INTRAVENOUS at 20:18

## 2025-02-22 SDOH — ECONOMIC STABILITY: INCOME INSECURITY: IN THE PAST 12 MONTHS HAS THE ELECTRIC, GAS, OIL, OR WATER COMPANY THREATENED TO SHUT OFF SERVICES IN YOUR HOME?: NO

## 2025-02-22 SDOH — SOCIAL STABILITY: SOCIAL INSECURITY: ARE YOU OR HAVE YOU BEEN THREATENED OR ABUSED PHYSICALLY, EMOTIONALLY, OR SEXUALLY BY ANYONE?: NO

## 2025-02-22 SDOH — SOCIAL STABILITY: SOCIAL INSECURITY: WITHIN THE LAST YEAR, HAVE YOU BEEN AFRAID OF YOUR PARTNER OR EX-PARTNER?: NO

## 2025-02-22 SDOH — ECONOMIC STABILITY: HOUSING INSECURITY: IN THE LAST 12 MONTHS, WAS THERE A TIME WHEN YOU WERE NOT ABLE TO PAY THE MORTGAGE OR RENT ON TIME?: NO

## 2025-02-22 SDOH — ECONOMIC STABILITY: TRANSPORTATION INSECURITY: IN THE PAST 12 MONTHS, HAS LACK OF TRANSPORTATION KEPT YOU FROM MEDICAL APPOINTMENTS OR FROM GETTING MEDICATIONS?: NO

## 2025-02-22 SDOH — ECONOMIC STABILITY: FOOD INSECURITY: WITHIN THE PAST 12 MONTHS, YOU WORRIED THAT YOUR FOOD WOULD RUN OUT BEFORE YOU GOT THE MONEY TO BUY MORE.: NEVER TRUE

## 2025-02-22 SDOH — ECONOMIC STABILITY: HOUSING INSECURITY: AT ANY TIME IN THE PAST 12 MONTHS, WERE YOU HOMELESS OR LIVING IN A SHELTER (INCLUDING NOW)?: NO

## 2025-02-22 SDOH — SOCIAL STABILITY: SOCIAL INSECURITY
WITHIN THE LAST YEAR, HAVE YOU BEEN KICKED, HIT, SLAPPED, OR OTHERWISE PHYSICALLY HURT BY YOUR PARTNER OR EX-PARTNER?: NO

## 2025-02-22 SDOH — SOCIAL STABILITY: SOCIAL INSECURITY: HAS ANYONE EVER THREATENED TO HURT YOUR FAMILY OR YOUR PETS?: NO

## 2025-02-22 SDOH — SOCIAL STABILITY: SOCIAL INSECURITY: WITHIN THE LAST YEAR, HAVE YOU BEEN HUMILIATED OR EMOTIONALLY ABUSED IN OTHER WAYS BY YOUR PARTNER OR EX-PARTNER?: NO

## 2025-02-22 SDOH — SOCIAL STABILITY: SOCIAL INSECURITY: ABUSE: ADULT

## 2025-02-22 SDOH — SOCIAL STABILITY: SOCIAL INSECURITY: DO YOU FEEL ANYONE HAS EXPLOITED OR TAKEN ADVANTAGE OF YOU FINANCIALLY OR OF YOUR PERSONAL PROPERTY?: NO

## 2025-02-22 SDOH — SOCIAL STABILITY: SOCIAL INSECURITY: DOES ANYONE TRY TO KEEP YOU FROM HAVING/CONTACTING OTHER FRIENDS OR DOING THINGS OUTSIDE YOUR HOME?: NO

## 2025-02-22 SDOH — SOCIAL STABILITY: SOCIAL INSECURITY: WERE YOU ABLE TO COMPLETE ALL THE BEHAVIORAL HEALTH SCREENINGS?: YES

## 2025-02-22 SDOH — SOCIAL STABILITY: SOCIAL INSECURITY
WITHIN THE LAST YEAR, HAVE YOU BEEN RAPED OR FORCED TO HAVE ANY KIND OF SEXUAL ACTIVITY BY YOUR PARTNER OR EX-PARTNER?: NO

## 2025-02-22 SDOH — ECONOMIC STABILITY: FOOD INSECURITY: HOW HARD IS IT FOR YOU TO PAY FOR THE VERY BASICS LIKE FOOD, HOUSING, MEDICAL CARE, AND HEATING?: NOT VERY HARD

## 2025-02-22 SDOH — ECONOMIC STABILITY: FOOD INSECURITY: WITHIN THE PAST 12 MONTHS, THE FOOD YOU BOUGHT JUST DIDN'T LAST AND YOU DIDN'T HAVE MONEY TO GET MORE.: NEVER TRUE

## 2025-02-22 SDOH — SOCIAL STABILITY: SOCIAL INSECURITY: ARE THERE ANY APPARENT SIGNS OF INJURIES/BEHAVIORS THAT COULD BE RELATED TO ABUSE/NEGLECT?: NO

## 2025-02-22 SDOH — ECONOMIC STABILITY: HOUSING INSECURITY: IN THE PAST 12 MONTHS, HOW MANY TIMES HAVE YOU MOVED WHERE YOU WERE LIVING?: 0

## 2025-02-22 SDOH — SOCIAL STABILITY: SOCIAL INSECURITY: HAVE YOU HAD THOUGHTS OF HARMING ANYONE ELSE?: NO

## 2025-02-22 SDOH — SOCIAL STABILITY: SOCIAL INSECURITY: HAVE YOU HAD ANY THOUGHTS OF HARMING ANYONE ELSE?: NO

## 2025-02-22 SDOH — SOCIAL STABILITY: SOCIAL INSECURITY: DO YOU FEEL UNSAFE GOING BACK TO THE PLACE WHERE YOU ARE LIVING?: NO

## 2025-02-22 ASSESSMENT — COGNITIVE AND FUNCTIONAL STATUS - GENERAL
DAILY ACTIVITIY SCORE: 24
MOBILITY SCORE: 24
DAILY ACTIVITIY SCORE: 24
DAILY ACTIVITIY SCORE: 24
CLIMB 3 TO 5 STEPS WITH RAILING: A LITTLE
MOBILITY SCORE: 24
MOBILITY SCORE: 22
WALKING IN HOSPITAL ROOM: A LITTLE

## 2025-02-22 ASSESSMENT — PAIN SCALES - GENERAL
PAINLEVEL_OUTOF10: 2
PAINLEVEL_OUTOF10: 0 - NO PAIN
PAINLEVEL_OUTOF10: 0 - NO PAIN

## 2025-02-22 ASSESSMENT — ENCOUNTER SYMPTOMS
NAUSEA: 0
PALPITATIONS: 0
FATIGUE: 0
DIFFICULTY URINATING: 0
DIZZINESS: 0
EYE DISCHARGE: 0
FACIAL ASYMMETRY: 0
HEADACHES: 0
CHILLS: 0
DYSURIA: 0
SEIZURES: 0
CHEST TIGHTNESS: 0
CONFUSION: 0
FLANK PAIN: 0
SPEECH DIFFICULTY: 0
NERVOUS/ANXIOUS: 1
SHORTNESS OF BREATH: 0
LIGHT-HEADEDNESS: 0
ABDOMINAL PAIN: 0
PHOTOPHOBIA: 0
ABDOMINAL DISTENTION: 0
RHINORRHEA: 1
FEVER: 0
WEAKNESS: 0
DIARRHEA: 0
VOMITING: 0
BACK PAIN: 1
TREMORS: 0
NUMBNESS: 1

## 2025-02-22 ASSESSMENT — COLUMBIA-SUICIDE SEVERITY RATING SCALE - C-SSRS
1. IN THE PAST MONTH, HAVE YOU WISHED YOU WERE DEAD OR WISHED YOU COULD GO TO SLEEP AND NOT WAKE UP?: NO
6. HAVE YOU EVER DONE ANYTHING, STARTED TO DO ANYTHING, OR PREPARED TO DO ANYTHING TO END YOUR LIFE?: NO
1. IN THE PAST MONTH, HAVE YOU WISHED YOU WERE DEAD OR WISHED YOU COULD GO TO SLEEP AND NOT WAKE UP?: NO
2. HAVE YOU ACTUALLY HAD ANY THOUGHTS OF KILLING YOURSELF?: NO
6. HAVE YOU EVER DONE ANYTHING, STARTED TO DO ANYTHING, OR PREPARED TO DO ANYTHING TO END YOUR LIFE?: NO
2. HAVE YOU ACTUALLY HAD ANY THOUGHTS OF KILLING YOURSELF?: NO

## 2025-02-22 ASSESSMENT — ACTIVITIES OF DAILY LIVING (ADL)
JUDGMENT_ADEQUATE_SAFELY_COMPLETE_DAILY_ACTIVITIES: YES
BATHING: INDEPENDENT
LACK_OF_TRANSPORTATION: NO
GROOMING: INDEPENDENT
ADEQUATE_TO_COMPLETE_ADL: YES
HEARING - RIGHT EAR: FUNCTIONAL
LACK_OF_TRANSPORTATION: NO
WALKS IN HOME: INDEPENDENT
TOILETING: INDEPENDENT
PATIENT'S MEMORY ADEQUATE TO SAFELY COMPLETE DAILY ACTIVITIES?: YES
DRESSING YOURSELF: INDEPENDENT
HEARING - LEFT EAR: FUNCTIONAL
FEEDING YOURSELF: INDEPENDENT

## 2025-02-22 ASSESSMENT — LIFESTYLE VARIABLES
EVER HAD A DRINK FIRST THING IN THE MORNING TO STEADY YOUR NERVES TO GET RID OF A HANGOVER: NO
TOTAL SCORE: 0
AUDIT-C TOTAL SCORE: 4
HAVE PEOPLE ANNOYED YOU BY CRITICIZING YOUR DRINKING: NO
HOW OFTEN DO YOU HAVE A DRINK CONTAINING ALCOHOL: 2-3 TIMES A WEEK
SKIP TO QUESTIONS 9-10: 0
AUDIT-C TOTAL SCORE: 4
HAVE YOU EVER FELT YOU SHOULD CUT DOWN ON YOUR DRINKING: NO
HOW OFTEN DO YOU HAVE 6 OR MORE DRINKS ON ONE OCCASION: LESS THAN MONTHLY
EVER FELT BAD OR GUILTY ABOUT YOUR DRINKING: NO
HOW MANY STANDARD DRINKS CONTAINING ALCOHOL DO YOU HAVE ON A TYPICAL DAY: PATIENT DOES NOT DRINK

## 2025-02-22 ASSESSMENT — PATIENT HEALTH QUESTIONNAIRE - PHQ9
2. FEELING DOWN, DEPRESSED OR HOPELESS: NOT AT ALL
1. LITTLE INTEREST OR PLEASURE IN DOING THINGS: NOT AT ALL
2. FEELING DOWN, DEPRESSED OR HOPELESS: NOT AT ALL
SUM OF ALL RESPONSES TO PHQ9 QUESTIONS 1 & 2: 0
1. LITTLE INTEREST OR PLEASURE IN DOING THINGS: NOT AT ALL
SUM OF ALL RESPONSES TO PHQ9 QUESTIONS 1 AND 2: 0

## 2025-02-22 ASSESSMENT — PAIN DESCRIPTION - LOCATION: LOCATION: BACK

## 2025-02-22 ASSESSMENT — PAIN - FUNCTIONAL ASSESSMENT
PAIN_FUNCTIONAL_ASSESSMENT: 0-10
PAIN_FUNCTIONAL_ASSESSMENT: 0-10
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
## 2025-02-22 NOTE — ED TRIAGE NOTES
Presented through triage for Leg heavyness onset Tuesday.  Sent by PCP for MRI to R/O Saddle anesthesia.  Ambuklates without difficuty form  lobby.
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
## 2025-02-22 NOTE — CARE PLAN
The patient's goals for the shift include  remain comfortable    The clinical goals for the shift include  pt will remain pain free

## 2025-02-22 NOTE — PATIENT INSTRUCTIONS
Saddle anesthesia with incomplete sensation of bowel emptying with some gait changes: Concern for the potential of cauda equina syndrome    Patient was sent to the ER for evaluation, PA notified    Herpes simplex: Will send in acyclovir for outbreaks

## 2025-02-22 NOTE — H&P
"Internal Medicine - History and Physical Note      Patient: Rafael Singh, Age: 54 y.o., SEX: male , MRN:[id number], ROOM:[facility],  Code: Full Code   Admitted On: 2/22/2025   Admitting Dx: Transverse myelitis (Multi) [G37.3]  PCP: Marti Bansal MD        Attending: Casey Piper DO        Chief Complaint   Patient: Rafael Singh is a 54 y.o. male who presented to the hospital for   Chief Complaint   Patient presents with    Back Pain     Sent by PCP for  MRI relating to leg weakness        HPI   Rafael Singh is a 54 y.o. year old male  patient with past medical history significant  for Bilateral optic neuritis diagnosed approximately 3 years ago, chronic herpes simplex virus infection since he was in his teens,herniated disks, and arthritis of the knee who is presenting after experiencing a burning sensation in his groin and thighs alongside paresthesia and numbness in his lower extremities bilaterally.  Patient's symptoms started approximately 1 week ago on the evening of 2/16. 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  After he had finished his shift and gone back to sleep, he woke up with a burning sensation in his groin and thigh region alongside a numbing sensation in his thighs as well.  The numbness spread down both of his legs to his feet and continued throughout the week.  He experienced a cold sensation in his feet with \"heaviness\", and while he was able to walk, he felt very wobbly. Furthermore he endorses a pins and needle sensation in his feet as well.  Denies lower extremity pain or swelling at this time.  Patient denies decreased sensation in his perineal or genital region.  Furthermore, he has no difficulty initiating bowel movements or urinating, however feels that he does not completely empty his bladder or bowels after going to the bathroom.  Of note patient has had this burning sensation in his thighs before as he has had chronic " "herpes virus infection since he was a teenager.  Patient states that he typically has this sensation approximately 6 times a year and that it lasts approximately 1 to 2 weeks.Although, this is the first time that he has experienced the neurological symptoms.  When patient was a teenager, he had received treatment for herpes, however he reports not receiving treatment for several decades as he was too ashamed to talk about his herpes diagnosis with his primary care physicians.  Typically he does not experience any herpes sores and only has a burning sensation, as he does now.  Patient was diagnosed with optic neuritis approximately 3 years ago, resulting in blurry vision and chronic vision changes which he is concerned could have been due to the herpes as well.  No acute changes in his vision at this time. He states that he did not tell his doctors when he was originally diagnosed with optic neuritis that he had herpes.  He underwent a Brain MRI for multiple sclerosis at the time, which patient reports was negative. The patient went to see his primary care provider the morning of admission who noted that he had decreased reflexes on the left side compared to the right with diminished Achilles and patellar reflexes in particular and noted decreased sensation in his pelvis as well.  Primary care provider recommended patient present to the ED for concerns of cauda equina and undergo MRI imaging. According to the patient, he had been sent to a urologist \"across the street\" from his primary care physician as well recently to evaluate him for any urinary obstruction, which he states he did not have aside from some minor scarring.  Unable to locate record of this encounter in patient's EMR.  Of note patient had recently finished a course of Tamiflu for influenza A infection approximately 1 week ago, right before his symptoms occurred.  Furthermore he continues to have some congestion and rhinitis, for which he took 2 days " of amoxicillin this week, that he received from his family member and not from a prescriber. Patient is adopted and does not know his family history. He is unaware of having any history of autoimmune diseases at this time.    Patient presented to the ED with unremarkable vitals on room air. Lab work and urinalysis was insignificant as well.  COVID, influenza, and RSV PCR negative.  CRP and ESR not elevated.  Postvoid shows 30 cc in the bladder.  CT CAP did not show any acute findings, 3.5 cm simple right renal cyst and a 5 mm simple left renal cyst with degenerative disc disease at the L5-S1 level. MRI of the lumbar spine and thoracic spine was performed, which indicated a T11-T12 0.7 cm X 0.6 cm X 0.4 cm focus of enhancement within the central dorsal midline of the spinal cord most favored to represent focal transverse myelitis, however other etiologies like vitamin B12 deficiency, copper deficiency syphilis and subacute cord infarct may cause a similar appearance.  No significant canal stenosis or neural foraminal stenosis.  Neurology was consulted in the ED and recommended patient receive 1 g of Solu-Medrol total today. Patient had already  received 10 mg of Decadron, which was converted to 50 mg of Solu-Medrol according to the pharmacy team and so he was ordered an additional 950 mg.  Patient also received 1 mg of Ativan as he is very anxious about going to MRI testing.    Brain MRI 2020: Abnormal T2 signal in bilateral optic nerves.  At right optic nerve it is as it passes through optic foramen & at prechiasmatic right optic nerve without involvement of chiasm.  Similar area but to a significantly less degree is identified in the same distribution at left optic nerve. These findings are consistent with idiopathic optic neuritis and etiology may be correlated clinically with differential being of idiopathic inflammatory change versus post viral change versus demyelinating disease.  Brain does not reveal any  "changes of demyelinating disease.     ROS  Review of Systems   Constitutional:  Negative for chills, fatigue and fever.   HENT:  Positive for congestion, postnasal drip and rhinorrhea.    Eyes:  Positive for visual disturbance. Negative for photophobia and discharge.        Chronic   Respiratory:  Negative for chest tightness and shortness of breath.    Cardiovascular:  Negative for chest pain and palpitations.   Gastrointestinal:  Negative for abdominal distention, abdominal pain, diarrhea, nausea and vomiting.   Genitourinary:  Positive for decreased urine volume. Negative for difficulty urinating, dysuria, flank pain, penile pain and urgency.        Inomplete emptying   Musculoskeletal:  Positive for back pain.        Chronic back pain   Skin:  Negative for pallor and rash.   Neurological:  Positive for numbness. Negative for dizziness, tremors, seizures, syncope, facial asymmetry, speech difficulty, weakness, light-headedness and headaches.   Psychiatric/Behavioral:  Negative for confusion. The patient is nervous/anxious.         12 Point ROS negative unless otherwise specified above.     ED COURSE:   Vitals - /89   Pulse 99   Temp 37.7 °C (99.9 °F)   Resp 16   Wt 86.2 kg (190 lb 0.6 oz)   SpO2 95%   Labs -   Lab Results   Component Value Date    WBC 7.0 02/22/2025    HGB 14.8 02/22/2025    HCT 43.9 02/22/2025    MCV 85 02/22/2025     02/22/2025     Lab Results   Component Value Date    GLUCOSE 106 (H) 02/22/2025    CALCIUM 9.5 02/22/2025     02/22/2025    K 4.5 02/22/2025    CO2 26 02/22/2025     02/22/2025    BUN 11 02/22/2025    CREATININE 0.84 02/22/2025   No results found for: \"TROPHS\"No results found for: \"BNP\"No results found for: \"DDIMERVTE\"  Imaging -   MR thoracic spine w and wo IV contrast   Final Result   Within the dorsal aspect of the lower spinal cord at T11-T12 disc   space level, there is a 0.7 cm x 0.6 cm x 0.4 cm focus of   non-masslike enhancement within the " central-dorsal midline of the   spinal cord. The lesion appears more discrete on sagittal imaging but   on axial images the margins are with cp and ill-defined. There is   intramedullary cord edema spanning above and below the lesion over   length of 3.8 cm. There is minimal focal posterior cord expansion   level the enhancement. No additional intramedullary lesions are seen.   No evidence of abnormal leptomeningeal enhancement.        Given the patient's symptoms began following infection with   influenza, this is most favored to represent focal transverse   myelitis. However, other etiologies should be excluded that could   cause a similar imaging appearance including vitamin B12 deficiency,   syphilis, copper deficiency, subacute cord infarct. Demyelination is   considered less likely and in the absence of sarcoidosis history this   is considered unlikely. Also recommend repeating contrast-enhanced   thoracic spine MRI in 4-6 weeks to re-evaluate for   regression/resolution. Neurology consultation recommended.        MACRO:   None.        Signed by: Lalo Kidd 2/22/2025 3:15 PM   Dictation workstation:   [id number]      CT chest abdomen pelvis w IV contrast   Final Result   CHEST:   1.  Remote granulomatous disease.        ABDOMEN-PELVIS:   1.  3.5 cm simple right renal cyst with 5 mm simple left renal cyst.   2. Fat containing umbilical hernia.   3. Degenerative disc disease at the L5-S1 level.        The lesion involving the lower thoracic cord is not demonstrated on   the CT examinations. Again, MRI of the thoracic spine with contrast   is advised.        MACRO:   None        Signed by: Simona Bowers 2/22/2025 2:24 PM   Dictation workstation:   [id number]      MR lumbar spine wo IV contrast   Final Result   The limited images through the lower thoracic spinal cord demonstrate   abnormal bright intramedullary signal on the STIR and T2 weighted   images within the thoracic spinal cord extending from the  T12 level   cranially through the obtained field of view at the T11 level   suspicious for underlying spinal cord edema. There is a questionable   nodular lesion noted within the thoracic spinal cord at the inferior   T11 level as seen on axial STIR and T2 slice 13 of 27. A dedicated   MRI of the thoracic spine with and without gadolinium would be   recommended for further evaluation.        The coronal  images demonstrate a mild dextrocurvature of the   lumbar and visualized lower thoracic spine.        There is multilevel spondylosis with varying degrees of spinal canal   and neural foraminal narrowing as described above.             There is a 33 mm cystic focus noted along the lower pole of the right   kidney.        MACRO:   Critical Finding:  See findings. Notification was initiated on   2/22/2025 at 12:57 pm by  Candido Brar.  (**-[facility]-**)        Signed by: Candido Brar 2/22/2025 12:57 PM   Dictation workstation:   [id number]         Interventions -   Medications   dexAMETHasone (PF) (Decadron) injection 10 mg (10 mg intravenous Given 2/22/25 1339)   LORazepam (Ativan) injection 1 mg (1 mg intravenous Given 2/22/25 1339)   iohexol (OMNIPaque) 350 mg iodine/mL solution 75 mL (75 mL intravenous Given 2/22/25 1328)   gadoterate meglumine (Dotarem) 0.5 mmol/mL contrast injection 17 mL (17 mL intravenous Given 2/22/25 1426)         Past Medical History:   Past Medical History:   Diagnosis Date    Acute bronchitis, unspecified 06/15/2017    Acute bronchitis    Other conditions influencing health status     No significant past medical history    Prostatitis 06/19/2023     Past Surgical History:   History reviewed. No pertinent surgical history.  Allergies:   Allergies   Allergen Reactions    Amoxicillin Other     Hot flashes    Pollen Extracts Other     Eyes burning, and mucus build up     Family History:   Family History   Adopted: Yes   Problem Relation Name Age of Onset    No Known Problems Mother      No  Known Problems Father       Home Meds:  Prior to Admission medications    Medication Sig Start Date End Date Taking? Authorizing Provider   acyclovir (Zovirax) 400 mg tablet Take 2 tablets (800 mg) by mouth 3 times a day for 2 days. As needed for outbreak 2/22/25 2/24/25  Marti Bansal MD   esomeprazole (NexIUM) 20 mg DR capsule Take 1 capsule (20 mg) by mouth once daily in the morning. Take before meals. Do not open capsule.    Historical Provider, MD   fexofenadine (Allegra) 180 mg tablet Take 1 tablet (180 mg) by mouth once daily.    Historical Provider, MD   montelukast (Singulair) 10 mg tablet Take 1 tablet (10 mg) by mouth once daily at bedtime.    Historical Provider, MD   Saline NasaL 0.65 % nasal spray TAKE 4 SPRAY(S) INTRANASALLY 4 TIMES A DAY 6/18/23   Historical Provider, MD   Trulicity 4.5 mg/0.5 mL pen injector  1/22/25   Historical Provider, MD     Social History:  - Coming from patient  - Tobacco: Former smoker and quit approximately a decade ago  - Alcohol: Rarely  - Illicit Drug: Never      Meds    Scheduled medications  acyclovir, 800 mg, oral, TID  cetirizine, 10 mg, oral, Daily  LORazepam, 1 mg, intravenous, Once  [START ON 2/23/2025] methylPREDNISolone sodium succinate (PF), 1,000 mg, intravenous, q24h  methylPREDNISolone sodium succinate (PF), 950 mg, intravenous, Once  montelukast, 10 mg, oral, Nightly  [START ON 2/23/2025] pantoprazole, 20 mg, oral, Daily before breakfast      Continuous medications     PRN medications  PRN medications: acetaminophen **OR** acetaminophen **OR** acetaminophen, ondansetron **OR** ondansetron     Objective    Physical Exam  Constitutional:       General: He is not in acute distress.     Appearance: He is not toxic-appearing.   HENT:      Head: Normocephalic and atraumatic.      Nose: Nose normal.   Eyes:      General: No scleral icterus.        Right eye: No discharge.         Left eye: No discharge.      Extraocular Movements: Extraocular movements  "intact.      Conjunctiva/sclera: Conjunctivae normal.      Pupils: Pupils are equal, round, and reactive to light.   Cardiovascular:      Rate and Rhythm: Normal rate and regular rhythm.      Pulses: Normal pulses.      Heart sounds: Normal heart sounds. No murmur heard.     No gallop.   Pulmonary:      Effort: Pulmonary effort is normal. No respiratory distress.      Breath sounds: Normal breath sounds. No wheezing.   Abdominal:      General: Abdomen is flat. There is no distension.      Palpations: Abdomen is soft.      Tenderness: There is no abdominal tenderness. There is no guarding or rebound.      Hernia: A hernia is present.      Comments: Chronic fat-containing umbilical hernia   Musculoskeletal:         General: No tenderness or signs of injury.      Cervical back: Normal range of motion.      Right lower leg: No edema.      Left lower leg: No edema.   Skin:     General: Skin is warm.      Findings: No erythema, lesion or rash.   Neurological:      Mental Status: He is alert and oriented to person, place, and time.      Sensory: Sensory deficit present.      Motor: Weakness present.      Comments: 2+ reflexes bilaterally Patellar and Achilles  Motor function is equal and strong bilaterally  No noticeable decrease in sensation to light touch and pinprick bilateral lower extremities according to the patient          Visit Vitals  /89   Pulse 99   Temp 37.7 °C (99.9 °F)   Resp 16   Ht 1.778 m (5' 10\")   Wt 86.2 kg (190 lb 0.6 oz)   SpO2 95%   BMI 27.27 kg/m²   Smoking Status Former   BSA 2.06 m²        No intake or output data in the 24 hours ending 02/22/25 1905          Labs:   Results from last 72 hours   Lab Units 02/22/25  1028   SODIUM mmol/L 139   POTASSIUM mmol/L 4.5   CHLORIDE mmol/L 103   CO2 mmol/L 26   BUN mg/dL 11   CREATININE mg/dL 0.84   GLUCOSE mg/dL 106*   CALCIUM mg/dL 9.5   ANION GAP mmol/L 15   EGFR mL/min/1.73m*2 >90      Results from last 72 hours   Lab Units 02/22/25  1028   WBC " "AUTO x10*3/uL 7.0   HEMOGLOBIN g/dL 14.8   HEMATOCRIT % 43.9   PLATELETS AUTO x10*3/uL 341   NEUTROS PCT AUTO % 50.4   LYMPHS PCT AUTO % 37.5   MONOS PCT AUTO % 8.5   EOS PCT AUTO % 2.2      Lab Results   Component Value Date    CALCIUM 9.5 02/22/2025      Lab Results   Component Value Date    CRP 0.31 02/22/2025       [unfilled]     Micro/ID:   No results found for the last 90 days.    No results found for: \"URINECULTURE\", \"BLOODCULT\", \"CSFCULTSMEAR\"                 No lab exists for component: \"AGALPCRNB\"           Assessment and Plan    Rafael Singh is a 54 y.o. male admitted on 2/22/2025 with past medical history significant  for Bilateral optic neuritis diagnosed approximately 3 years ago, chronic herpes simplex virus infection since he was in his teens,herniated disks, and arthritis of the knee who is presenting after experiencing a burning sensation in his groin and thighs alongside paresthesia and numbness in his lower extremities bilaterally.     ACUTE MEDICAL ISSUES:  # Multiple Sclerosis Flare  # Concern for transverse myelitis of T11-T12  # History of herpes simplex virus infection   # History of optic neuritis  Per ED staff, neurology are concerned that this may be multiple sclerosis flare and recommended 1 g of solu-medrol tonight  MRI of the lumbar spine and thoracic spine was performed, which indicated a T11-T12 0.7 cm X 0.6 cm X 0.4 cm focus of enhancement within the central dorsal midline of the spinal cord most favored to represent focal transverse myelitis, however other etiologies like vitamin B12 deficiency, copper deficiency syphilis and subacute cord infarct may cause a similar appearance.   No prior history for multiple sclerosis, reports Brain MRI approximately 3 years ago to evaluate for MS, which was negative  Patient had a recent influenza infection, however his symptoms do not appear to be similar to GBS clinically  Has had no recent vaccinations, with most recent being COVID and " shingles vaccine that he received approximately 1 year ago  ESR and CRP not elevated  Brain MRI 2020 findings as in HPI    Plan:  Repeat MRI Brain to evaluate for multiple sclerosis, may receive 1 dose of Ativan prior to procedure   Ordered 950 mg of Solu-Medrol as patient had already received 10 mg of Decadron in the ED which, according to pharmacy, is equivalent to 50 mg of Solu-Medrol  Continue 1 g Solumedrol daily tomorrow for 2 days  Neurology consulted, appreciate recs  Discharged with oral acyclovir as needed by primary care, will continue scheduled at this time. Consider transition to IV acyclovir per neurology reccs  HSV, HIV, Hilario-Bravo, Respiratory viral panel pending  Copper levels, vitamin B12 levels, YOSELIN with reflex to HAILEY pending  Consider lumbar puncture based on Brain MRI results    #B/L Renal Cysts  -Simple Cysts on CT imaging bilaterally  -There is a 3-1/2 cm simple cyst in the lower pole of the right kidney. A 5 mm left renal cyst is seen as well  -Urology referral upon discharge.    CHRONIC MEDICAL ISSUES:  Allergic rhinitis continue Zyrtec and singulair  GERD- PPI      Fluids: PRN  Electrolytes: PRN  Nutrition: Adult Regular  Antimicrobials: Acyclovir  DVT ppx: None indicated  GI ppx: PPI  Catheter: None  Lines: PIV  Supplemental Oxygen: RA  HealthCare Providers:  - PCP: Marti Bansal MD   Emergency Contact: Extended Emergency Contact Information  Primary Emergency Contact: Rosa Isela Singh  Home Phone: [phone]  Relation: Spouse   Code: Full Code     Disposition: ELOS>48 Hours, undergoing treatment for presumed Multiple Sclerosis with steroids. Neurology consulted.    Candido Bello DO  Internal Medicine, PGY- 1  02/22/25 at 7:05 PM

## 2025-02-23 ENCOUNTER — APPOINTMENT (OUTPATIENT)
Dept: RADIOLOGY | Facility: HOSPITAL | Age: 55
End: 2025-02-23
Payer: COMMERCIAL

## 2025-02-23 VITALS
HEART RATE: 74 BPM | WEIGHT: 190.04 LBS | DIASTOLIC BLOOD PRESSURE: 74 MMHG | BODY MASS INDEX: 27.21 KG/M2 | OXYGEN SATURATION: 96 % | TEMPERATURE: 97.5 F | RESPIRATION RATE: 18 BRPM | HEIGHT: 70 IN | SYSTOLIC BLOOD PRESSURE: 101 MMHG

## 2025-02-23 LAB
ALBUMIN SERPL BCP-MCNC: 4.4 G/DL (ref 3.4–5)
ALP SERPL-CCNC: 57 U/L (ref 33–120)
ALT SERPL W P-5'-P-CCNC: 17 U/L (ref 10–52)
ANION GAP SERPL CALC-SCNC: 14 MMOL/L (ref 10–20)
AST SERPL W P-5'-P-CCNC: 13 U/L (ref 9–39)
BASOPHILS # BLD AUTO: 0.01 X10*3/UL (ref 0–0.1)
BASOPHILS NFR BLD AUTO: 0.1 %
BILIRUB SERPL-MCNC: 0.4 MG/DL (ref 0–1.2)
BUN SERPL-MCNC: 13 MG/DL (ref 6–23)
CALCIUM SERPL-MCNC: 9.1 MG/DL (ref 8.6–10.3)
CHLORIDE SERPL-SCNC: 104 MMOL/L (ref 98–107)
CO2 SERPL-SCNC: 25 MMOL/L (ref 21–32)
CREAT SERPL-MCNC: 0.91 MG/DL (ref 0.5–1.3)
EGFRCR SERPLBLD CKD-EPI 2021: >90 ML/MIN/1.73M*2
EOSINOPHIL # BLD AUTO: 0 X10*3/UL (ref 0–0.7)
EOSINOPHIL NFR BLD AUTO: 0 %
ERYTHROCYTE [DISTWIDTH] IN BLOOD BY AUTOMATED COUNT: 13.2 % (ref 11.5–14.5)
GLUCOSE SERPL-MCNC: 160 MG/DL (ref 74–99)
HCT VFR BLD AUTO: 42.7 % (ref 41–52)
HGB BLD-MCNC: 14.5 G/DL (ref 13.5–17.5)
IMM GRANULOCYTES # BLD AUTO: 0.06 X10*3/UL (ref 0–0.7)
IMM GRANULOCYTES NFR BLD AUTO: 0.6 % (ref 0–0.9)
LYMPHOCYTES # BLD AUTO: 0.77 X10*3/UL (ref 1.2–4.8)
LYMPHOCYTES NFR BLD AUTO: 7.4 %
MAGNESIUM SERPL-MCNC: 2.08 MG/DL (ref 1.6–2.4)
MCH RBC QN AUTO: 28.8 PG (ref 26–34)
MCHC RBC AUTO-ENTMCNC: 34 G/DL (ref 32–36)
MCV RBC AUTO: 85 FL (ref 80–100)
MONOCYTES # BLD AUTO: 0.06 X10*3/UL (ref 0.1–1)
MONOCYTES NFR BLD AUTO: 0.6 %
NEUTROPHILS # BLD AUTO: 9.53 X10*3/UL (ref 1.2–7.7)
NEUTROPHILS NFR BLD AUTO: 91.3 %
NRBC BLD-RTO: 0 /100 WBCS (ref 0–0)
PHOSPHATE SERPL-MCNC: 2.6 MG/DL (ref 2.5–4.9)
PLATELET # BLD AUTO: 343 X10*3/UL (ref 150–450)
POTASSIUM SERPL-SCNC: 4.2 MMOL/L (ref 3.5–5.3)
PROT SERPL-MCNC: 7.5 G/DL (ref 6.4–8.2)
RBC # BLD AUTO: 5.04 X10*6/UL (ref 4.5–5.9)
SODIUM SERPL-SCNC: 139 MMOL/L (ref 136–145)
WBC # BLD AUTO: 10.4 X10*3/UL (ref 4.4–11.3)

## 2025-02-23 PROCEDURE — 99222 1ST HOSP IP/OBS MODERATE 55: CPT

## 2025-02-23 PROCEDURE — 36415 COLL VENOUS BLD VENIPUNCTURE: CPT

## 2025-02-23 PROCEDURE — 99254 IP/OBS CNSLTJ NEW/EST MOD 60: CPT | Performed by: PSYCHIATRY & NEUROLOGY

## 2025-02-23 PROCEDURE — 99291 CRITICAL CARE FIRST HOUR: CPT | Performed by: EMERGENCY MEDICINE

## 2025-02-23 PROCEDURE — 85025 COMPLETE CBC W/AUTO DIFF WBC: CPT

## 2025-02-23 PROCEDURE — 2500000002 HC RX 250 W HCPCS SELF ADMINISTERED DRUGS (ALT 637 FOR MEDICARE OP, ALT 636 FOR OP/ED)

## 2025-02-23 PROCEDURE — 84100 ASSAY OF PHOSPHORUS: CPT

## 2025-02-23 PROCEDURE — 2500000004 HC RX 250 GENERAL PHARMACY W/ HCPCS (ALT 636 FOR OP/ED)

## 2025-02-23 PROCEDURE — 2500000001 HC RX 250 WO HCPCS SELF ADMINISTERED DRUGS (ALT 637 FOR MEDICARE OP)

## 2025-02-23 PROCEDURE — 1100000001 HC PRIVATE ROOM DAILY

## 2025-02-23 PROCEDURE — 80053 COMPREHEN METABOLIC PANEL: CPT

## 2025-02-23 PROCEDURE — 83735 ASSAY OF MAGNESIUM: CPT

## 2025-02-23 RX ADMIN — MONTELUKAST 10 MG: 10 TABLET, FILM COATED ORAL at 20:23

## 2025-02-23 RX ADMIN — CETIRIZINE HYDROCHLORIDE 10 MG: 10 TABLET, FILM COATED ORAL at 08:38

## 2025-02-23 RX ADMIN — ACYCLOVIR 800 MG: 200 CAPSULE ORAL at 08:38

## 2025-02-23 RX ADMIN — PANTOPRAZOLE SODIUM 20 MG: 20 TABLET, DELAYED RELEASE ORAL at 06:41

## 2025-02-23 RX ADMIN — ACYCLOVIR 800 MG: 200 CAPSULE ORAL at 20:23

## 2025-02-23 RX ADMIN — DEXTROSE MONOHYDRATE 1000 MG: 50 INJECTION, SOLUTION INTRAVENOUS at 15:04

## 2025-02-23 RX ADMIN — ACYCLOVIR 800 MG: 200 CAPSULE ORAL at 15:03

## 2025-02-23 ASSESSMENT — COGNITIVE AND FUNCTIONAL STATUS - GENERAL
DAILY ACTIVITIY SCORE: 24
MOBILITY SCORE: 24
PATIENT BASELINE BEDBOUND: NO

## 2025-02-23 ASSESSMENT — PAIN - FUNCTIONAL ASSESSMENT: PAIN_FUNCTIONAL_ASSESSMENT: 0-10

## 2025-02-23 ASSESSMENT — ACTIVITIES OF DAILY LIVING (ADL): LACK_OF_TRANSPORTATION: NO

## 2025-02-23 ASSESSMENT — PAIN SCALES - GENERAL: PAINLEVEL_OUTOF10: 0 - NO PAIN

## 2025-02-23 NOTE — PROGRESS NOTES
02/23/25 1336   Discharge Planning   Living Arrangements Spouse/significant other;Children   Support Systems Spouse/significant other;Family members;Friends/neighbors;Children   Assistance Needed Patient is A&O X3, on room air at baseline, does not uses a CPAP/BIPAP at home, is not currently active with any community/home care agencies, is independent with ADLs, drives and does not require use of assistive devices for ambulation. Patient denies further needs upon discharge.   Type of Residence Private residence   Number of Stairs to Enter Residence 2   Number of Stairs Within Residence 0   Do you have animals or pets at home? No   Who is requesting discharge planning? Provider   Home or Post Acute Services None   Expected Discharge Disposition Home   Does the patient need discharge transport arranged? No   Financial Resource Strain   How hard is it for you to pay for the very basics like food, housing, medical care, and heating? Not hard   Housing Stability   In the last 12 months, was there a time when you were not able to pay the mortgage or rent on time? N   At any time in the past 12 months, were you homeless or living in a shelter (including now)? N   Transportation Needs   In the past 12 months, has lack of transportation kept you from medical appointments or from getting medications? no   In the past 12 months, has lack of transportation kept you from meetings, work, or from getting things needed for daily living? No   Stroke Family Assessment   Stroke Family Assessment Needed No   Intensity of Service   Intensity of Service 0-30 min

## 2025-02-23 NOTE — CONSULTS
Inpatient consult to Neurology  Consult performed by: Axel Brown MD  Consult ordered by: Karen Lacey DO          History Of Present Illness  Rafael Singh is a 54 y.o. male presenting with about 5 days of legs feeling numb and heaviness in the inner thighs, pain at times radiating into the legs, and a sense of incomplete emptying of his bladder and bowels.  He has had trouble walking.  I spoke with the ED mid-level caregiver, recommended MRI of the lumbar spine, and he also had a MRI of the thoracic spine without and with contrast- this showed a 0.7 cm x 0.6 cm x 0.4 cm focus of abnormal signal at the T11-T12 level, no abnormal enhancement.  He has received steroid treatment, and scheduled to receive up to three infusions of Solumedrol 1000 mg over 3 days.  The patient was recently treated for Influenza A- one week ago (with Tamiflu).  He indicates recurrent herpes 2 infections.  He was diagnosed with bilateral optic neuritis in mid-2020, was seen by Dr. Nguyễn, [facility]uro[facility], and Dr. Sinha- [facility] Multiple sclerosis specialist.  Patient had a MTI of the brain which showed one small subcortical white matter lesion, The MOG blood test was negative.  He was recommended vitamin D supplementation, wellness care.  Past Medical History  Past Medical History:   Diagnosis Date    Acute bronchitis, unspecified 06/15/2017    Acute bronchitis    Other conditions influencing health status     No significant past medical history    Prostatitis 06/19/2023     Surgical History  History reviewed. No pertinent surgical history.  Social History  Social History     Tobacco Use    Smoking status: Former     Types: Cigarettes    Smokeless tobacco: Former   Substance Use Topics    Alcohol use: Yes    Drug use: Never     Allergies  Amoxicillin and Pollen extracts  Medications Prior to Admission   Medication Sig Dispense Refill Last Dose/Taking    acyclovir (Zovirax) 400 mg tablet Take 2 tablets (800 mg) by  "mouth 3 times a day for 2 days. As needed for outbreak 24 tablet 2     esomeprazole (NexIUM) 20 mg DR capsule Take 1 capsule (20 mg) by mouth once daily in the morning. Take before meals. Do not open capsule.       fexofenadine (Allegra) 180 mg tablet Take 1 tablet (180 mg) by mouth once daily.       montelukast (Singulair) 10 mg tablet Take 1 tablet (10 mg) by mouth once daily at bedtime.       Saline NasaL 0.65 % nasal spray TAKE 4 SPRAY(S) INTRANASALLY 4 TIMES A DAY       Trulicity 4.5 mg/0.5 mL pen injector           Review of Systems  Neurological Exam  Mental Status: Awake and alert. Oriented to person, place and time. Speech was fluent to history. Naming, repetition and comprehension were intact. Short term memory intact tested by word recall and attention intact tested by serial sevens.      CN: Pupils were 4/4mm to 2/2mm. VF intact to finger counting. Ocular versions were intact. Facial sensation was intact to light touch bilaterally. Facial expression was symmetric. Palate elevated symmetrically. Shoulder shrug was symmetric. Tongue protruded midline.     Motor: Normal muscle bulk and tone. Strength was 5/5 bilaterally for shoulder abduction, elbow flexion/extension,  strength, hip flexion, knee flexion/extension, ankle dorsi- and plantar flexion. There were no abnormal movements.     Sensory: Intact to light touch and temperature in all 4 extremities. Does not extinguish to double simultaneous touch.     Coordination: Finger to nose and heel to shin were intact with no dysmetria.     Reflexes: 2+ bilaterally in biceps, brachioradialis, patella, and achilles. Toes were downgoing bilaterally.    Gait: Narrow based and normal.     Physical Exam  Last Recorded Vitals  Blood pressure 129/87, pulse 99, temperature 37.4 °C (99.3 °F), temperature source Temporal, resp. rate 16, height 1.778 m (5' 10\"), weight 86.2 kg (190 lb 0.6 oz), SpO2 98%.    Relevant Results                    Guerda Coma Scale  Best " Eye Response: Spontaneous  Best Verbal Response: Oriented  Best Motor Response: Follows commands  Guerda Coma Scale Score: 15                 I have personally reviewed the following imaging results MR thoracic spine w and wo IV contrast    Result Date: 2/22/2025  Interpreted By:  Lalo Kidd, STUDY: MR THORACIC SPINE W AND WO IV CONTRAST;  2/22/2025 2:41 pm   INDICATION: Signs/Symptoms:Concern for spinal edema in the lower thoracic spine secondary to possible malignancy. Diagnosed with influenza approximately 2 weeks ago. All neurological symptoms started after this infection.     COMPARISON: Correlation with same-day MRI lumbar spine without contrast.   ACCESSION NUMBER(S): [id number]   ORDERING CLINICIAN: TIM POWELL   TECHNIQUE: Multiplanar, multi-sequence images of the thoracic spine were obtained before and after the intravenous administration of 17 mL gadolinium.   FINDINGS: VISUALIZED CHEST: No significant abnormality.   PARASPINAL SOFT TISSUES: No significant abnormality.   SPINAL CORD: Within the dorsal aspect of the lower spinal cord at T11-T12 disc space level, there is a 0.7 cm x 0.6 cm x 0.4 cm focus of non-masslike enhancement within the central-dorsal midline of the spinal cord (axial image 56, series 25; sagittal image 11, series 23). The lesion appears more discrete on sagittal imaging but on axial images the margins are with cp and ill-defined. There is intramedullary cord edema spanning above and below the lesion over length of 3.8 cm (sagittal STIR image 11, series 18). There is minimal focal posterior cord expansion level the enhancement. No additional intramedullary lesions are seen. No evidence of abnormal leptomeningeal enhancement.   BONE MARROW/VERTEBRAE: No suspicious osseous lesions. Within the left aspect of the T5 vertebral body, there is a 0.6 cm x 0.7 cm x 0.9 cm T2/T1 hyperintense lesion consistent with hemangioma. The overall bone marrow signal is within normal limits.  No acute fracture. Vertebral body heights are maintained.   ALIGNMENT: No traumatic spondylolisthesis.   SPINAL CANAL, INTERVERTEBRAL DISCS, AND NEURAL FORAMINA: No significant canal stenosis or neural foraminal stenosis. There are minimal broad-based disc bulges at T4-T5, T5-T6, T6-T7, T9-T10, T10-T11, T12-L1.       Within the dorsal aspect of the lower spinal cord at T11-T12 disc space level, there is a 0.7 cm x 0.6 cm x 0.4 cm focus of non-masslike enhancement within the central-dorsal midline of the spinal cord. The lesion appears more discrete on sagittal imaging but on axial images the margins are with cp and ill-defined. There is intramedullary cord edema spanning above and below the lesion over length of 3.8 cm. There is minimal focal posterior cord expansion level the enhancement. No additional intramedullary lesions are seen. No evidence of abnormal leptomeningeal enhancement.   Given the patient's symptoms began following infection with influenza, this is most favored to represent focal transverse myelitis. However, other etiologies should be excluded that could cause a similar imaging appearance including vitamin B12 deficiency, syphilis, copper deficiency, subacute cord infarct. Demyelination is considered less likely and in the absence of sarcoidosis history this is considered unlikely. Also recommend repeating contrast-enhanced thoracic spine MRI in 4-6 weeks to re-evaluate for regression/resolution. Neurology consultation recommended.   MACRO: None.   Signed by: Lalo Kidd 2/22/2025 3:15 PM Dictation workstation:   [id number]    CT chest abdomen pelvis w IV contrast    Result Date: 2/22/2025  Interpreted By:  Simona Bowers, STUDY: CT CHEST ABDOMEN PELVIS W IV CONTRAST;  2/22/2025 1:24 pm   INDICATION: Signs/Symptoms: Low back pain radiating into the lower extremities with decreased sensation, more so on the left than on the right. Decreased lower extremity reflexes on the left     COMPARISON:  CT examination of the chest done on 09/30/2024   ACCESSION NUMBER(S): [id number]   ORDERING CLINICIAN: TIM POWELL   TECHNIQUE: CT of the chest, abdomen, and pelvis was performed.  Contiguous axial images were obtained at 3 mm slice thickness through the chest, abdomen and pelvis. Coronal and sagittal reconstructions at 3 mm slice thickness were performed. 75 ML of Omnipaque 350 was administered intravenously without immediate complication.   FINDINGS: CHEST:   LUNG/PLEURA/LARGE AIRWAYS: There is a 1 cm calcified granuloma in the superior segment of the left lower lobe with small stable calcified granuloma seen along the superior aspect of the left major fissure. No pleural effusion is present on either side.   VESSELS: The thoracic aorta is normal caliber as is the main pulmonary artery.   HEART: The cardiac size is normal. There is no pericardial abnormality. No coronary artery calcifications are evident.   MEDIASTINUM AND TOM: There are calcified mediastinal and left hilar lymph nodes observed with no mediastinal or hilar lymphadenopathy or mass demonstrated. The thoracic esophagus is normally visualized.   CHEST WALL AND LOWER NECK: The thyroid gland is unremarkable in appearance. There is no axillary lymphadenopathy or mass.   No osteolytic or osteoblastic bony metastases are seen to involve the thoracic cage. There is no bony canal stenosis in the thoracic region.   ABDOMEN:   LIVER: The liver is normal in size without evidence of focal liver lesions.   BILE DUCTS: The intrahepatic and extrahepatic ducts are not dilated.   GALLBLADDER: No calcified stones. No wall thickening.   PANCREAS: The pancreas appears unremarkable without evidence of ductal dilatation or masses.   SPLEEN: The spleen is normal in size without focal lesions.   ADRENAL GLANDS: Bilateral adrenal glands appear normal.   KIDNEYS AND URETERS: There is a 3-1/2 cm simple cyst in the lower pole of the right kidney. A 5 mm left renal cyst is  seen. No suspicious renal mass on either side is present. There is no hydronephrosis.   PELVIS:   BLADDER: The urinary bladder appears normal without abnormal wall thickening.   REPRODUCTIVE ORGANS: The prostate is not enlarged.   BOWEL: The stomach, small and large bowel are normal in appearance without wall thickening or obstruction.The appendix appears normal.     VESSELS: The aorta and IVC appear normal.   PERITONEUM/RETROPERITONEUM/LYMPH NODES: There is no free or loculated fluid collection, no free intraperitoneal air. The retroperitoneum appears normal.  No abdominopelvic lymphadenopathy is present.   BONE AND SOFT TISSUE: A fat containing umbilical hernia is seen measuring 3 cm in diameter. There is degenerative disc space narrowing at the L5-S1 level with vacuum disc phenomenon.       CHEST: 1.  Remote granulomatous disease.   ABDOMEN-PELVIS: 1.  3.5 cm simple right renal cyst with 5 mm simple left renal cyst. 2. Fat containing umbilical hernia. 3. Degenerative disc disease at the L5-S1 level.   The lesion involving the lower thoracic cord is not demonstrated on the CT examinations. Again, MRI of the thoracic spine with contrast is advised.   MACRO: None   Signed by: Simona Bowers 2/22/2025 2:24 PM Dictation workstation:   [id number]    MR lumbar spine wo IV contrast    Result Date: 2/22/2025  Interpreted By:  Candido Brar, STUDY: MR LUMBAR SPINE WO IV CONTRAST;  2/22/2025 12:41 pm   INDICATION: Signs/Symptoms:history of herniated discs, worsening lower back pain with radiation into both legs, decreased strength in both legs and decrease sensation in the pelvis, pt feels like he is retaining urine.   COMPARISON: 12/07/2022   ACCESSION NUMBER(S): [id number]   ORDERING CLINICIAN: TIM POWELL   TECHNIQUE: Sagittal STIR, sagittal T2, sagittal T1, axial T2, axial T1 weighted MRI images of the lumbar spine were obtained without intravenous contrast administration.   FINDINGS: The limited images through the  lower thoracic spinal cord demonstrate abnormal bright intramedullary signal on the STIR and T2 weighted images within the thoracic spinal cord extending from the T12 level cranially through the obtained field of view at the T11 level suspicious for underlying spinal cord edema. There is a questionable nodular lesion noted within the thoracic spinal cord at the inferior T11 level as seen on axial STIR and T2 slice 13 of 27. A dedicated MRI of the thoracic spine with and without gadolinium would be recommended for further evaluation.   The coronal  images demonstrate a mild dextrocurvature of the lumbar and visualized lower thoracic spine.   There are degenerative signal changes noted along endplates most pronounced at the L5/S1 and L4/5 levels. A small Schmorl's node is noted along the inferior endplate of T11.   The conus medullaris is normally positioned terminating at the  L1 level.   There is diminished disc signal at the L4/5 and L5/S1 levels along with loss of disc height at the L5/S1 level compatible with degenerative disc disease.   At the L5/S1 level,  there is posterior osteophytic spurring and posterior disc bulge along with hypertrophic degenerative facet changes right greater than left. There is mild overall narrowing of the thecal sac within the spinal canal. There is moderate right and mild left-sided neural foraminal narrowing.   At the L4/L5 level,  there is mild posterior osteophytic spurring and posterior disc bulge along with degenerative facet changes contributing to mild spinal canal narrowing. There is mild-to-moderate encroachment upon the neural foramen bilaterally.   At the L3/L4 level,  there is a mild posterior disc bulge and degenerative facet changes contributing to mild encroachment upon the spinal canal. There is mild encroachment upon the inferior recesses of the neural foramen right greater than left.   At the L2/L3 level,  there is a minimal posterior disc bulge and mild  degenerative facet changes without significant spinal canal or neural foraminal narrowing.   At the L1/L2 level,  there is a mild posterior disc bulge slightly more pronounced to the left of midline without significant spinal canal or neural foraminal narrowing.   At the T12/L1 level,  there is a mild posterior disc bulge without significant spinal canal or neural foraminal narrowing.   There is a 33 mm cystic focus noted along the lower pole of the right kidney.       The limited images through the lower thoracic spinal cord demonstrate abnormal bright intramedullary signal on the STIR and T2 weighted images within the thoracic spinal cord extending from the T12 level cranially through the obtained field of view at the T11 level suspicious for underlying spinal cord edema. There is a questionable nodular lesion noted within the thoracic spinal cord at the inferior T11 level as seen on axial STIR and T2 slice 13 of 27. A dedicated MRI of the thoracic spine with and without gadolinium would be recommended for further evaluation.   The coronal  images demonstrate a mild dextrocurvature of the lumbar and visualized lower thoracic spine.   There is multilevel spondylosis with varying degrees of spinal canal and neural foraminal narrowing as described above.     There is a 33 mm cystic focus noted along the lower pole of the right kidney.   MACRO: Critical Finding:  See findings. Notification was initiated on 2/22/2025 at 12:57 pm by  Candido Brar.  (**-OCF-**)   Signed by: Candido Brar 2/22/2025 12:57 PM Dictation workstation:   [id number]  . reviewed     Assessment/Plan   Assessment & Plan  Transverse myelitis (Multi)      Impression: 54 year old man with acute transverse myelitis in the lower thoracic spinal cord, probably due to Influenza A.  I do not think he is at risk for developing multiple sclerosis- previously he had a benign MRI of the brain.  He may have had recurrent herpes simplex 2 infections, with  this recurrence causing TM.  I recommend he be finished out on the Solumedrol infusions, and discharge as soon as tomorrow evening after his 3rd steroid infusion.  Follow up with Dr. Bansal for possible chronic herpes simplex medication.  Follow up with Dr. Sinha if incomplete recovery and questions about the TM cause and further workup/  He should have a follow up MRI of the thoracic spine without and with contrast in 4-6 weeks to make sure the TM is resolving.  Will sign off- off service tomorrow.       I spent 60 minutes in the professional and overall care of this patient.      Axel Brown MD

## 2025-02-23 NOTE — PROGRESS NOTES
Rafael Singh is a 54 y.o. male on day 0 of admission presenting with Transverse myelitis (Multi).      Subjective   No acute events since admission, history reconfirmed with patient. Patient states that his new complaint that he's never experienced before is bilateral foot numbness. He otherwise denies issues with urination or bowel movements, and only endorses mild lower back pain, states he's had a history of lumbar disc herniations in the past.    Objective     Last Recorded Vitals  /76 (BP Location: Right arm, Patient Position: Sitting)   Pulse 104   Temp 36.6 °C (97.9 °F) (Temporal)   Resp 16   Wt 86.2 kg (190 lb 0.6 oz)   SpO2 97%   Intake/Output last 3 Shifts:    Intake/Output Summary (Last 24 hours) at 2/23/2025 0945  Last data filed at 2/23/2025 0600  Gross per 24 hour   Intake 507.6 ml   Output --   Net 507.6 ml       Admission Weight  Weight: 86.2 kg (190 lb 0.6 oz) (02/22/25 1017)    Daily Weight  02/22/25 : 86.2 kg (190 lb 0.6 oz)    Image Results  MR thoracic spine w and wo IV contrast  Narrative: Interpreted By:  Lalo Kidd,   STUDY:  MR THORACIC SPINE W AND WO IV CONTRAST;  2/22/2025 2:41 pm      INDICATION:  Signs/Symptoms:Concern for spinal edema in the lower thoracic spine  secondary to possible malignancy. Diagnosed with influenza  approximately 2 weeks ago. All neurological symptoms started after  this infection.          COMPARISON:  Correlation with same-day MRI lumbar spine without contrast.      ACCESSION NUMBER(S):  [id number]      ORDERING CLINICIAN:  TIM POWELL      TECHNIQUE:  Multiplanar, multi-sequence images of the thoracic spine were  obtained before and after the intravenous administration of 17 mL  gadolinium.      FINDINGS:  VISUALIZED CHEST: No significant abnormality.      PARASPINAL SOFT TISSUES: No significant abnormality.      SPINAL CORD: Within the dorsal aspect of the lower spinal cord at  T11-T12 disc space level, there is a 0.7 cm x 0.6 cm x 0.4  cm focus  of non-masslike enhancement within the central-dorsal midline of the  spinal cord (axial image 56, series 25; sagittal image 11, series  23). The lesion appears more discrete on sagittal imaging but on  axial images the margins are with cp and ill-defined. There is  intramedullary cord edema spanning above and below the lesion over  length of 3.8 cm (sagittal STIR image 11, series 18). There is  minimal focal posterior cord expansion level the enhancement. No  additional intramedullary lesions are seen. No evidence of abnormal  leptomeningeal enhancement.      BONE MARROW/VERTEBRAE: No suspicious osseous lesions. Within the left  aspect of the T5 vertebral body, there is a 0.6 cm x 0.7 cm x 0.9 cm  T2/T1 hyperintense lesion consistent with hemangioma. The overall  bone marrow signal is within normal limits. No acute fracture.  Vertebral body heights are maintained.      ALIGNMENT: No traumatic spondylolisthesis.      SPINAL CANAL, INTERVERTEBRAL DISCS, AND NEURAL FORAMINA:  No significant canal stenosis or neural foraminal stenosis.  There are minimal broad-based disc bulges at T4-T5, T5-T6, T6-T7,  T9-T10, T10-T11, T12-L1.      Impression: Within the dorsal aspect of the lower spinal cord at T11-T12 disc  space level, there is a 0.7 cm x 0.6 cm x 0.4 cm focus of  non-masslike enhancement within the central-dorsal midline of the  spinal cord. The lesion appears more discrete on sagittal imaging but  on axial images the margins are with cp and ill-defined. There is  intramedullary cord edema spanning above and below the lesion over  length of 3.8 cm. There is minimal focal posterior cord expansion  level the enhancement. No additional intramedullary lesions are seen.  No evidence of abnormal leptomeningeal enhancement.      Given the patient's symptoms began following infection with  influenza, this is most favored to represent focal transverse  myelitis. However, other etiologies should be excluded that  could  cause a similar imaging appearance including vitamin B12 deficiency,  syphilis, copper deficiency, subacute cord infarct. Demyelination is  considered less likely and in the absence of sarcoidosis history this  is considered unlikely. Also recommend repeating contrast-enhanced  thoracic spine MRI in 4-6 weeks to re-evaluate for  regression/resolution. Neurology consultation recommended.      MACRO:  None.      Signed by: Lalo Kidd 2/22/2025 3:15 PM  Dictation workstation:   [id number]  CT chest abdomen pelvis w IV contrast  Narrative: Interpreted By:  Simnoa Bowers,   STUDY:  CT CHEST ABDOMEN PELVIS W IV CONTRAST;  2/22/2025 1:24 pm      INDICATION:  Signs/Symptoms: Low back pain radiating into the lower extremities  with decreased sensation, more so on the left than on the right.  Decreased lower extremity reflexes on the left          COMPARISON:  CT examination of the chest done on 09/30/2024      ACCESSION NUMBER(S):  [id number]      ORDERING CLINICIAN:  TIM POWELL      TECHNIQUE:  CT of the chest, abdomen, and pelvis was performed.  Contiguous axial  images were obtained at 3 mm slice thickness through the chest,  abdomen and pelvis. Coronal and sagittal reconstructions at 3 mm  slice thickness were performed. 75 ML of Omnipaque 350 was  administered intravenously without immediate complication.      FINDINGS:  CHEST:      LUNG/PLEURA/LARGE AIRWAYS:  There is a 1 cm calcified granuloma in the superior segment of the  left lower lobe with small stable calcified granuloma seen along the  superior aspect of the left major fissure. No pleural effusion is  present on either side.      VESSELS:  The thoracic aorta is normal caliber as is the main pulmonary artery.      HEART:  The cardiac size is normal. There is no pericardial abnormality. No  coronary artery calcifications are evident.      MEDIASTINUM AND TOM:  There are calcified mediastinal and left hilar lymph nodes observed  with no  mediastinal or hilar lymphadenopathy or mass demonstrated.  The thoracic esophagus is normally visualized.      CHEST WALL AND LOWER NECK:  The thyroid gland is unremarkable in appearance. There is no axillary  lymphadenopathy or mass.      No osteolytic or osteoblastic bony metastases are seen to involve the  thoracic cage. There is no bony canal stenosis in the thoracic region.      ABDOMEN:      LIVER:  The liver is normal in size without evidence of focal liver lesions.      BILE DUCTS:  The intrahepatic and extrahepatic ducts are not dilated.      GALLBLADDER:  No calcified stones. No wall thickening.      PANCREAS:  The pancreas appears unremarkable without evidence of ductal  dilatation or masses.      SPLEEN:  The spleen is normal in size without focal lesions.      ADRENAL GLANDS:  Bilateral adrenal glands appear normal.      KIDNEYS AND URETERS:  There is a 3-1/2 cm simple cyst in the lower pole of the right  kidney. A 5 mm left renal cyst is seen. No suspicious renal mass on  either side is present. There is no hydronephrosis.      PELVIS:      BLADDER:  The urinary bladder appears normal without abnormal wall thickening.      REPRODUCTIVE ORGANS:  The prostate is not enlarged.      BOWEL:  The stomach, small and large bowel are normal in appearance without  wall thickening or obstruction.The appendix appears normal.          VESSELS:  The aorta and IVC appear normal.      PERITONEUM/RETROPERITONEUM/LYMPH NODES:  There is no free or loculated fluid collection, no free  intraperitoneal air. The retroperitoneum appears normal.  No  abdominopelvic lymphadenopathy is present.      BONE AND SOFT TISSUE:  A fat containing umbilical hernia is seen measuring 3 cm in diameter.  There is degenerative disc space narrowing at the L5-S1 level with  vacuum disc phenomenon.      Impression: CHEST:  1.  Remote granulomatous disease.      ABDOMEN-PELVIS:  1.  3.5 cm simple right renal cyst with 5 mm simple left renal  cyst.  2. Fat containing umbilical hernia.  3. Degenerative disc disease at the L5-S1 level.      The lesion involving the lower thoracic cord is not demonstrated on  the CT examinations. Again, MRI of the thoracic spine with contrast  is advised.      MACRO:  None      Signed by: Simona Bowers 2/22/2025 2:24 PM  Dictation workstation:   [id number]  MR lumbar spine wo IV contrast  Narrative: Interpreted By:  Candido Brar,   STUDY:  MR LUMBAR SPINE WO IV CONTRAST;  2/22/2025 12:41 pm      INDICATION:  Signs/Symptoms:history of herniated discs, worsening lower back pain  with radiation into both legs, decreased strength in both legs and  decrease sensation in the pelvis, pt feels like he is retaining urine.      COMPARISON:  12/07/2022      ACCESSION NUMBER(S):  [id number]      ORDERING CLINICIAN:  TIM POWELL      TECHNIQUE:  Sagittal STIR, sagittal T2, sagittal T1, axial T2, axial T1 weighted  MRI images of the lumbar spine were obtained without intravenous  contrast administration.      FINDINGS:  The limited images through the lower thoracic spinal cord demonstrate  abnormal bright intramedullary signal on the STIR and T2 weighted  images within the thoracic spinal cord extending from the T12 level  cranially through the obtained field of view at the T11 level  suspicious for underlying spinal cord edema. There is a questionable  nodular lesion noted within the thoracic spinal cord at the inferior  T11 level as seen on axial STIR and T2 slice 13 of 27. A dedicated  MRI of the thoracic spine with and without gadolinium would be  recommended for further evaluation.      The coronal  images demonstrate a mild dextrocurvature of the  lumbar and visualized lower thoracic spine.      There are degenerative signal changes noted along endplates most  pronounced at the L5/S1 and L4/5 levels. A small Schmorl's node is  noted along the inferior endplate of T11.      The conus medullaris is normally positioned  terminating at the  L1  level.      There is diminished disc signal at the L4/5 and L5/S1 levels along  with loss of disc height at the L5/S1 level compatible with  degenerative disc disease.      At the L5/S1 level,  there is posterior osteophytic spurring and  posterior disc bulge along with hypertrophic degenerative facet  changes right greater than left. There is mild overall narrowing of  the thecal sac within the spinal canal. There is moderate right and  mild left-sided neural foraminal narrowing.      At the L4/L5 level,  there is mild posterior osteophytic spurring and  posterior disc bulge along with degenerative facet changes  contributing to mild spinal canal narrowing. There is  mild-to-moderate encroachment upon the neural foramen bilaterally.      At the L3/L4 level,  there is a mild posterior disc bulge and  degenerative facet changes contributing to mild encroachment upon the  spinal canal. There is mild encroachment upon the inferior recesses  of the neural foramen right greater than left.      At the L2/L3 level,  there is a minimal posterior disc bulge and mild  degenerative facet changes without significant spinal canal or neural  foraminal narrowing.      At the L1/L2 level,  there is a mild posterior disc bulge slightly  more pronounced to the left of midline without significant spinal  canal or neural foraminal narrowing.      At the T12/L1 level,  there is a mild posterior disc bulge without  significant spinal canal or neural foraminal narrowing.      There is a 33 mm cystic focus noted along the lower pole of the right  kidney.      Impression: The limited images through the lower thoracic spinal cord demonstrate  abnormal bright intramedullary signal on the STIR and T2 weighted  images within the thoracic spinal cord extending from the T12 level  cranially through the obtained field of view at the T11 level  suspicious for underlying spinal cord edema. There is a questionable  nodular  lesion noted within the thoracic spinal cord at the inferior  T11 level as seen on axial STIR and T2 slice 13 of 27. A dedicated  MRI of the thoracic spine with and without gadolinium would be  recommended for further evaluation.      The coronal  images demonstrate a mild dextrocurvature of the  lumbar and visualized lower thoracic spine.      There is multilevel spondylosis with varying degrees of spinal canal  and neural foraminal narrowing as described above.          There is a 33 mm cystic focus noted along the lower pole of the right  kidney.      MACRO:  Critical Finding:  See findings. Notification was initiated on  2/22/2025 at 12:57 pm by  Candido Brar.  (**-OCF-**)      Signed by: Candido Brar 2/22/2025 12:57 PM  Dictation workstation:   [id number]      Physical Exam  Vitals reviewed.   Constitutional:       General: He is not in acute distress.     Appearance: He is not ill-appearing.   Cardiovascular:      Rate and Rhythm: Normal rate and regular rhythm.      Heart sounds: Normal heart sounds. No murmur heard.  Pulmonary:      Effort: No respiratory distress.      Breath sounds: Normal breath sounds. No wheezing.   Abdominal:      General: There is no distension.      Palpations: Abdomen is soft.      Tenderness: There is no abdominal tenderness.   Neurological:      Mental Status: He is alert and oriented to person, place, and time.      Sensory: Sensory deficit present.      Comments: Continues to endorse bilateral foot numbness as well as a burning sensation around his inner thighs and groin area. Patellar tendon reflexes intact bilaterally         Relevant Results  Scheduled medications  acyclovir, 800 mg, oral, TID  cetirizine, 10 mg, oral, Daily  LORazepam, 1 mg, intravenous, Once  methylPREDNISolone sodium succinate (PF), 1,000 mg, intravenous, q24h  montelukast, 10 mg, oral, Nightly  pantoprazole, 20 mg, oral, Daily before breakfast      Continuous medications     PRN medications  PRN  medications: acetaminophen **OR** acetaminophen **OR** acetaminophen, ondansetron **OR** ondansetron    Results for orders placed or performed during the hospital encounter of 02/22/25 (from the past 24 hours)   Urinalysis with Reflex Culture and Microscopic   Result Value Ref Range    Color, Urine Light-Yellow Light-Yellow, Yellow, Dark-Yellow    Appearance, Urine Clear Clear    Specific Gravity, Urine 1.009 1.005 - 1.035    pH, Urine 5.5 5.0, 5.5, 6.0, 6.5, 7.0, 7.5, 8.0    Protein, Urine NEGATIVE NEGATIVE, 10 (TRACE), 20 (TRACE) mg/dL    Glucose, Urine Normal Normal mg/dL    Blood, Urine NEGATIVE NEGATIVE mg/dL    Ketones, Urine NEGATIVE NEGATIVE mg/dL    Bilirubin, Urine NEGATIVE NEGATIVE mg/dL    Urobilinogen, Urine Normal Normal mg/dL    Nitrite, Urine NEGATIVE NEGATIVE    Leukocyte Esterase, Urine NEGATIVE NEGATIVE   Extra Urine Gray Tube   Result Value Ref Range    Extra Tube Hold for add-ons.    CBC and Auto Differential   Result Value Ref Range    WBC 7.0 4.4 - 11.3 x10*3/uL    nRBC 0.0 0.0 - 0.0 /100 WBCs    RBC 5.17 4.50 - 5.90 x10*6/uL    Hemoglobin 14.8 13.5 - 17.5 g/dL    Hematocrit 43.9 41.0 - 52.0 %    MCV 85 80 - 100 fL    MCH 28.6 26.0 - 34.0 pg    MCHC 33.7 32.0 - 36.0 g/dL    RDW 13.2 11.5 - 14.5 %    Platelets 341 150 - 450 x10*3/uL    Neutrophils % 50.4 40.0 - 80.0 %    Immature Granulocytes %, Automated 0.4 0.0 - 0.9 %    Lymphocytes % 37.5 13.0 - 44.0 %    Monocytes % 8.5 2.0 - 10.0 %    Eosinophils % 2.2 0.0 - 6.0 %    Basophils % 1.0 0.0 - 2.0 %    Neutrophils Absolute 3.51 1.20 - 7.70 x10*3/uL    Immature Granulocytes Absolute, Automated 0.03 0.00 - 0.70 x10*3/uL    Lymphocytes Absolute 2.61 1.20 - 4.80 x10*3/uL    Monocytes Absolute 0.59 0.10 - 1.00 x10*3/uL    Eosinophils Absolute 0.15 0.00 - 0.70 x10*3/uL    Basophils Absolute 0.07 0.00 - 0.10 x10*3/uL   Comprehensive metabolic panel   Result Value Ref Range    Glucose 106 (H) 74 - 99 mg/dL    Sodium 139 136 - 145 mmol/L    Potassium  4.5 3.5 - 5.3 mmol/L    Chloride 103 98 - 107 mmol/L    Bicarbonate 26 21 - 32 mmol/L    Anion Gap 15 10 - 20 mmol/L    Urea Nitrogen 11 6 - 23 mg/dL    Creatinine 0.84 0.50 - 1.30 mg/dL    eGFR >90 >60 mL/min/1.73m*2    Calcium 9.5 8.6 - 10.3 mg/dL    Albumin 4.6 3.4 - 5.0 g/dL    Alkaline Phosphatase 68 33 - 120 U/L    Total Protein 7.7 6.4 - 8.2 g/dL    AST 18 9 - 39 U/L    Bilirubin, Total 0.4 0.0 - 1.2 mg/dL    ALT 21 10 - 52 U/L   Sedimentation rate, automated   Result Value Ref Range    Sedimentation Rate 8 0 - 20 mm/h   C-reactive protein   Result Value Ref Range    C-Reactive Protein 0.31 <1.00 mg/dL   HIV 1/2 Antigen/Antibody Screen with Reflex to Confirmation   Result Value Ref Range    HIV 1/2 Antigen/Antibody Screen with Reflex to Confirmation Nonreactive Nonreactive   Sars-CoV-2 and Influenza A/B PCR   Result Value Ref Range    Flu A Result Not Detected Not Detected    Flu B Result Not Detected Not Detected    Coronavirus 2019, PCR Not Detected Not Detected   RSV PCR   Result Value Ref Range    RSV PCR Not Detected Not Detected   Vitamin B12   Result Value Ref Range    Vitamin B12 601 211 - 911 pg/mL   CBC and Auto Differential   Result Value Ref Range    WBC 10.4 4.4 - 11.3 x10*3/uL    nRBC 0.0 0.0 - 0.0 /100 WBCs    RBC 5.04 4.50 - 5.90 x10*6/uL    Hemoglobin 14.5 13.5 - 17.5 g/dL    Hematocrit 42.7 41.0 - 52.0 %    MCV 85 80 - 100 fL    MCH 28.8 26.0 - 34.0 pg    MCHC 34.0 32.0 - 36.0 g/dL    RDW 13.2 11.5 - 14.5 %    Platelets 343 150 - 450 x10*3/uL    Neutrophils % 91.3 40.0 - 80.0 %    Immature Granulocytes %, Automated 0.6 0.0 - 0.9 %    Lymphocytes % 7.4 13.0 - 44.0 %    Monocytes % 0.6 2.0 - 10.0 %    Eosinophils % 0.0 0.0 - 6.0 %    Basophils % 0.1 0.0 - 2.0 %    Neutrophils Absolute 9.53 (H) 1.20 - 7.70 x10*3/uL    Immature Granulocytes Absolute, Automated 0.06 0.00 - 0.70 x10*3/uL    Lymphocytes Absolute 0.77 (L) 1.20 - 4.80 x10*3/uL    Monocytes Absolute 0.06 (L) 0.10 - 1.00 x10*3/uL     Eosinophils Absolute 0.00 0.00 - 0.70 x10*3/uL    Basophils Absolute 0.01 0.00 - 0.10 x10*3/uL   Comprehensive metabolic panel   Result Value Ref Range    Glucose 160 (H) 74 - 99 mg/dL    Sodium 139 136 - 145 mmol/L    Potassium 4.2 3.5 - 5.3 mmol/L    Chloride 104 98 - 107 mmol/L    Bicarbonate 25 21 - 32 mmol/L    Anion Gap 14 10 - 20 mmol/L    Urea Nitrogen 13 6 - 23 mg/dL    Creatinine 0.91 0.50 - 1.30 mg/dL    eGFR >90 >60 mL/min/1.73m*2    Calcium 9.1 8.6 - 10.3 mg/dL    Albumin 4.4 3.4 - 5.0 g/dL    Alkaline Phosphatase 57 33 - 120 U/L    Total Protein 7.5 6.4 - 8.2 g/dL    AST 13 9 - 39 U/L    Bilirubin, Total 0.4 0.0 - 1.2 mg/dL    ALT 17 10 - 52 U/L   Magnesium   Result Value Ref Range    Magnesium 2.08 1.60 - 2.40 mg/dL   Phosphorus   Result Value Ref Range    Phosphorus 2.6 2.5 - 4.9 mg/dL     Assessment/Plan   Rafael Singh is a 54 y.o. male admitted on 2/22/2025 with past medical history significant  for Bilateral optic neuritis diagnosed approximately 3 years ago, chronic herpes simplex virus infection since he was in his teens, herniated disks, and arthritis of the knee who is presenting after experiencing a burning sensation in his groin and thighs alongside paresthesia and numbness in his lower extremities bilaterally.      ACUTE MEDICAL ISSUES:  # Multiple Sclerosis Flare  # Concern for transverse myelitis of T11-T12  # History of herpes simplex virus infection   # History of optic neuritis  - Repeat MRI brain today for further evaluation of MS  - Continue 1 g Solumedrol for today and tomorrow to complete 3 day course of steroids as recommended per neuro in the ED.  - Neurology consulted, appreciate recs  - Continue PO acyclovir for history of HSV, consider switching to IV if neuro recommends  - HIV negative, HSV, EBV, viral panel pending  - Copper levels, vitamin B12 levels, YOSELIN with reflex to HAILEY pending  - Consider lumbar puncture based on Brain MRI results     #B/L Renal Cysts  -Simple Cysts  on CT imaging bilaterally  -There is a 3-1/2 cm simple cyst in the lower pole of the right kidney. A 5 mm left renal cyst is seen as well  -Urology referral upon discharge.     CHRONIC MEDICAL ISSUES:  Allergic rhinitis continue Zyrtec and singulair  GERD- PPI        Fluids: PRN  Electrolytes: PRN  Nutrition: Adult Regular  Antimicrobials: Acyclovir  DVT ppx: None indicated  GI ppx: PPI  Catheter: None  Lines: PIV  Supplemental Oxygen: RA  HealthCare Providers:  - PCP: Marti Bansal MD   Emergency Contact: Extended Emergency Contact Information  Primary Emergency Contact: Rosa Isela Singh  Home Phone: [phone]  Relation: Spouse   Code: Full Code      Disposition: Pending neurologic clearance prior to discharge likely home.    Corrine Zamora MD

## 2025-02-23 NOTE — HOSPITAL COURSE
Rafael Singh is a 54 year old male with a PMH of Bilateral optic neuritis diagnosed approximately 3 years ago, chronic herpes simplex virus infection since he was in his teens, herniated disks, and arthritis of the knee who is presenting after experiencing a burning sensation in his groin and thighs alongside paresthesia and numbness in his lower extremities bilaterally. Patient underwent MR imaging of his lumbar and thoracic spine, lumbar spine showing underlying spinal cord edema with questionable nodular lesion at the inferior T11 level. It also showed a 33 mm right kidney cyst. MRI of thoracic spine showing likely transverse myelitis. ER staff reached out to neurology Dr. Brown, who recommended 1g of IV Solumedrol for 3 days. Neurology followed up while patient was in the hospital, recommending that he gets a follow up MRI of the thoracic spine in 4-6 weeks to make sure that the transverse myelitis is resolving, and signed off.

## 2025-02-23 NOTE — ED PROCEDURE NOTE
Procedure  Critical Care    Performed by: Karen Lacey DO  Authorized by: Karen Lacey DO    Critical care provider statement:     Critical care time (minutes):  42    Critical care time was exclusive of:  Separately billable procedures and treating other patients    Critical care was necessary to treat or prevent imminent or life-threatening deterioration of the following conditions:  CNS failure or compromise    Critical care was time spent personally by me on the following activities:  Blood draw for specimens, ordering and performing treatments and interventions, ordering and review of laboratory studies, development of treatment plan with patient or surrogate, discussions with consultants, ordering and review of radiographic studies, pulse oximetry, discussions with primary provider, evaluation of patient's response to treatment, re-evaluation of patient's condition, examination of patient, review of old charts and obtaining history from patient or surrogate    Care discussed with: admitting provider                 Karen Lacey DO  02/23/25 0709

## 2025-02-24 ENCOUNTER — TELEPHONE (OUTPATIENT)
Dept: PRIMARY CARE | Facility: CLINIC | Age: 55
End: 2025-02-24
Payer: COMMERCIAL

## 2025-02-24 VITALS
DIASTOLIC BLOOD PRESSURE: 66 MMHG | OXYGEN SATURATION: 96 % | WEIGHT: 190.04 LBS | HEART RATE: 76 BPM | TEMPERATURE: 99.7 F | BODY MASS INDEX: 27.21 KG/M2 | SYSTOLIC BLOOD PRESSURE: 126 MMHG | RESPIRATION RATE: 19 BRPM | HEIGHT: 70 IN

## 2025-02-24 DIAGNOSIS — B00.9 HERPES SIMPLEX: ICD-10-CM

## 2025-02-24 PROBLEM — G37.3 TRANSVERSE MYELITIS (MULTI): Status: RESOLVED | Noted: 2025-02-22 | Resolved: 2025-02-24

## 2025-02-24 LAB
ALBUMIN SERPL BCP-MCNC: 3.8 G/DL (ref 3.4–5)
ALP SERPL-CCNC: 56 U/L (ref 33–120)
ALT SERPL W P-5'-P-CCNC: 13 U/L (ref 10–52)
ANA SER QL HEP2 SUBST: NEGATIVE
ANION GAP SERPL CALC-SCNC: 14 MMOL/L (ref 10–20)
AST SERPL W P-5'-P-CCNC: 10 U/L (ref 9–39)
BASOPHILS # BLD AUTO: 0.02 X10*3/UL (ref 0–0.1)
BASOPHILS NFR BLD AUTO: 0.1 %
BILIRUB SERPL-MCNC: 0.3 MG/DL (ref 0–1.2)
BUN SERPL-MCNC: 22 MG/DL (ref 6–23)
CALCIUM SERPL-MCNC: 8.6 MG/DL (ref 8.6–10.3)
CHLORIDE SERPL-SCNC: 107 MMOL/L (ref 98–107)
CO2 SERPL-SCNC: 24 MMOL/L (ref 21–32)
CREAT SERPL-MCNC: 0.85 MG/DL (ref 0.5–1.3)
EGFRCR SERPLBLD CKD-EPI 2021: >90 ML/MIN/1.73M*2
EOSINOPHIL # BLD AUTO: 0 X10*3/UL (ref 0–0.7)
EOSINOPHIL NFR BLD AUTO: 0 %
ERYTHROCYTE [DISTWIDTH] IN BLOOD BY AUTOMATED COUNT: 13.7 % (ref 11.5–14.5)
GLUCOSE SERPL-MCNC: 148 MG/DL (ref 74–99)
HCT VFR BLD AUTO: 38.5 % (ref 41–52)
HGB BLD-MCNC: 12.8 G/DL (ref 13.5–17.5)
HSV1 DNA BLD QL NAA+PROBE: NOT DETECTED
HSV2 DNA BLD QL NAA+PROBE: NOT DETECTED
IMM GRANULOCYTES # BLD AUTO: 0.12 X10*3/UL (ref 0–0.7)
IMM GRANULOCYTES NFR BLD AUTO: 0.6 % (ref 0–0.9)
LYMPHOCYTES # BLD AUTO: 0.96 X10*3/UL (ref 1.2–4.8)
LYMPHOCYTES NFR BLD AUTO: 5 %
MAGNESIUM SERPL-MCNC: 2.1 MG/DL (ref 1.6–2.4)
MCH RBC QN AUTO: 28.5 PG (ref 26–34)
MCHC RBC AUTO-ENTMCNC: 33.2 G/DL (ref 32–36)
MCV RBC AUTO: 86 FL (ref 80–100)
MONOCYTES # BLD AUTO: 0.52 X10*3/UL (ref 0.1–1)
MONOCYTES NFR BLD AUTO: 2.7 %
NEUTROPHILS # BLD AUTO: 17.46 X10*3/UL (ref 1.2–7.7)
NEUTROPHILS NFR BLD AUTO: 91.6 %
NRBC BLD-RTO: 0 /100 WBCS (ref 0–0)
PHOSPHATE SERPL-MCNC: 2.8 MG/DL (ref 2.5–4.9)
PLATELET # BLD AUTO: 358 X10*3/UL (ref 150–450)
POTASSIUM SERPL-SCNC: 4 MMOL/L (ref 3.5–5.3)
PROT SERPL-MCNC: 6.6 G/DL (ref 6.4–8.2)
RBC # BLD AUTO: 4.49 X10*6/UL (ref 4.5–5.9)
SODIUM SERPL-SCNC: 141 MMOL/L (ref 136–145)
WBC # BLD AUTO: 19.1 X10*3/UL (ref 4.4–11.3)

## 2025-02-24 PROCEDURE — 85025 COMPLETE CBC W/AUTO DIFF WBC: CPT

## 2025-02-24 PROCEDURE — 99239 HOSP IP/OBS DSCHRG MGMT >30: CPT

## 2025-02-24 PROCEDURE — 2500000002 HC RX 250 W HCPCS SELF ADMINISTERED DRUGS (ALT 637 FOR MEDICARE OP, ALT 636 FOR OP/ED)

## 2025-02-24 PROCEDURE — 80053 COMPREHEN METABOLIC PANEL: CPT

## 2025-02-24 PROCEDURE — 84100 ASSAY OF PHOSPHORUS: CPT

## 2025-02-24 PROCEDURE — 83735 ASSAY OF MAGNESIUM: CPT

## 2025-02-24 PROCEDURE — 36415 COLL VENOUS BLD VENIPUNCTURE: CPT

## 2025-02-24 PROCEDURE — 2500000004 HC RX 250 GENERAL PHARMACY W/ HCPCS (ALT 636 FOR OP/ED): Mod: JZ

## 2025-02-24 PROCEDURE — 2500000001 HC RX 250 WO HCPCS SELF ADMINISTERED DRUGS (ALT 637 FOR MEDICARE OP)

## 2025-02-24 RX ORDER — ACYCLOVIR 400 MG/1
800 TABLET ORAL 3 TIMES DAILY
Qty: 24 TABLET | Refills: 2 | Status: ON HOLD | OUTPATIENT
Start: 2025-02-24 | End: 2025-02-26

## 2025-02-24 RX ADMIN — DEXTROSE MONOHYDRATE 1000 MG: 50 INJECTION, SOLUTION INTRAVENOUS at 12:11

## 2025-02-24 RX ADMIN — CETIRIZINE HYDROCHLORIDE 10 MG: 10 TABLET, FILM COATED ORAL at 08:14

## 2025-02-24 RX ADMIN — PANTOPRAZOLE SODIUM 20 MG: 20 TABLET, DELAYED RELEASE ORAL at 05:57

## 2025-02-24 RX ADMIN — ACYCLOVIR 800 MG: 200 CAPSULE ORAL at 08:14

## 2025-02-24 ASSESSMENT — COGNITIVE AND FUNCTIONAL STATUS - GENERAL
DAILY ACTIVITIY SCORE: 24
MOBILITY SCORE: 24
DAILY ACTIVITIY SCORE: 24
MOBILITY SCORE: 24

## 2025-02-24 ASSESSMENT — PAIN SCALES - GENERAL
PAINLEVEL_OUTOF10: 0 - NO PAIN
PAINLEVEL_OUTOF10: 0 - NO PAIN

## 2025-02-24 ASSESSMENT — ACTIVITIES OF DAILY LIVING (ADL): LACK_OF_TRANSPORTATION: NO

## 2025-02-24 NOTE — DISCHARGE INSTRUCTIONS
Please, take your home medications as instructed after being discharged from the hospital.       OTHER INSTRUCTIONS:  Please be careful about driving your car. If you experience worsening numbness, please do not drive a motor vehicle or perform additional activities that require you to use your lower extremities.    UPCOMING APPOINTMENTS:     Please, follow-up with your Primary Care Provider within 7 to 14 days after leaving the hospital. Please contact your Neurologist Dr. Ramirez in the [facility] ([phone]) to schedule an appointment within the next month. Please schedule an appointment to undergo an MRI within 4-6 weeks. /appointment services has been requested to make an appointment for you, however if you do not hear back from them within 1 to 2 days, please call your primary physician's office to schedule an appointment. Bring your photo ID and insurance card to your appointment.   [facility]  services can be reached at [phone].     If you experience any worsening symptoms or have any concerns, please contact your primary care provider to schedule an appointment. If you cannot get in touch with your primary care physician, please return to the nearest emergency room or urgent care clinic for an evaluation and treatment.     Thank you for choosing [facility] and allowing us to partake in your medical care!     - Your [facility] inpatient primary care team.

## 2025-02-24 NOTE — CARE PLAN
The patient's goals for the shift include      The clinical goals for the shift include pt will have all questions answered prior to DC

## 2025-02-24 NOTE — CARE PLAN
Problem: Pain  Goal: Takes deep breaths with improved pain control throughout the shift  Outcome: Progressing     Problem: Pain  Goal: Walks with improved pain control throughout the shift  Outcome: Progressing     Problem: Fall/Injury  Goal: Not fall by end of shift  Outcome: Progressing     Problem: Fall/Injury  Goal: Verbalize understanding of personal risk factors for fall in the hospital  Outcome: Progressing   The patient's goals for the shift include  finish meds and work towards discharge tomorrow     The clinical goals for the shift include patient will have good pain management overnight

## 2025-02-24 NOTE — DISCHARGE SUMMARY
Discharge Diagnosis  Transverse myelitis (Multi)    Issues Requiring Follow-Up  Transverse Myelitis  Chronic Herpes Infection    Discharge Meds     Medication List      CONTINUE taking these medications     esomeprazole 20 mg DR capsule; Commonly known as: NexIUM   fexofenadine 180 mg tablet; Commonly known as: Allegra   montelukast 10 mg tablet; Commonly known as: Singulair   Saline NasaL 0.65 % nasal spray; Generic drug: sodium chloride   Trulicity 4.5 mg/0.5 mL pen injector; Generic drug: dulaglutide     STOP taking these medications     acyclovir 400 mg tablet; Commonly known as: Zovirax       Test Results Pending At Discharge  Pending Labs       Order Current Status    YOSELIN with Reflex to HAILEY In process    Copper, serum In process    Hilario-Bravo PCR, Quant,Plasma In process    HSV by PCR, Qualitative Whole Blood In process    Respiratory Viral Panel In process            Hospital Course  Rafael Singh is a 54 year old male with a PMH of Bilateral optic neuritis diagnosed approximately 3 years ago, chronic herpes simplex virus infection since he was in his teens, herniated disks, and arthritis of the knee who is presenting after experiencing a burning sensation in his groin and thighs alongside paresthesia and numbness in his lower extremities bilaterally.  Vitals and lab work in the ED were unremarkable. Patient underwent MR imaging of his lumbar and thoracic spine, lumbar spine showing underlying spinal cord edema with questionable nodular lesion at the inferior T11 level. It also showed a 33 mm right kidney cyst. MRI of thoracic spine showing likely transverse myelitis. ER staff reached out to neurology Dr. Brown, who recommended 1g of IV Solumedrol for 3 days. Neurology followed up while patient was in the hospital, recommending that he gets a follow up MRI of the thoracic spine in 4-6 weeks to make sure that the transverse myelitis is resolving, and signed off.  Patient received 3 days of IV Solu-Medrol 1 g  daily and acyclovir (2/22 to 2/24).  Vitamin B12 level, ESR and CRP levels were normal. His symptoms minimally improved but able to ambulate without significant issue and no new issues arose and he was stable for discharge on 2/24/2025 with instructions to repeat MRI in 4-6 weeks and to follow-up with his neurologist and primary care provider.    Pertinent Physical Exam At Time of Discharge  Physical Exam  Constitutional:       General: He is not in acute distress.     Appearance: He is not ill-appearing.   Cardiovascular:      Rate and Rhythm: Normal rate and regular rhythm.      Heart sounds: Normal heart sounds. No murmur heard.  Pulmonary:      Effort: No respiratory distress.      Breath sounds: Normal breath sounds. No wheezing.   Abdominal:      General: There is no distension.      Palpations: Abdomen is soft.      Tenderness: There is no abdominal tenderness.   Neurological:      Mental Status: He is alert and oriented x 3     Lower extremity reflexes intact bilaterally     Numbness improved in lower extremities    Outpatient Follow-Up  No future appointments.      Candido Bello,

## 2025-02-24 NOTE — TELEPHONE ENCOUNTER
Pt was sent home today with no Rx.  They now have no Rx at their local pharmacy for the pt.  Rosa Isela would like to speak with nursing they are not sure what they are supposed to do; they thought Dr JAMES had called in medications on Sat/Sunday after seeing the pt in office?

## 2025-02-24 NOTE — PROGRESS NOTES
02/24/25 0954   Discharge Planning   Living Arrangements Spouse/significant other;Children   Support Systems Spouse/significant other;Family members;Friends/neighbors;Children   Assistance Needed A&OX4; independent with ADLs with no DME; drives; room air baseline and currently room air; PCP Marti Bansal   Type of Residence Private residence   Number of Stairs to Enter Residence 2   Number of Stairs Within Residence 0   Do you have animals or pets at home? No   Who is requesting discharge planning? Provider   Home or Post Acute Services None   Expected Discharge Disposition Home   Financial Resource Strain   How hard is it for you to pay for the very basics like food, housing, medical care, and heating? Not hard   Housing Stability   In the last 12 months, was there a time when you were not able to pay the mortgage or rent on time? N   At any time in the past 12 months, were you homeless or living in a shelter (including now)? N   Transportation Needs   In the past 12 months, has lack of transportation kept you from medical appointments or from getting medications? no   In the past 12 months, has lack of transportation kept you from meetings, work, or from getting things needed for daily living? No        02/24/25 1100   Discharge Planning   Expected Discharge Disposition Home  (DC today confirmed. Patient denies home going needs. Pt has car here for transport home. Kids coming to meet patient to assist)

## 2025-02-25 ENCOUNTER — PATIENT OUTREACH (OUTPATIENT)
Dept: PRIMARY CARE | Facility: CLINIC | Age: 55
End: 2025-02-25
Payer: COMMERCIAL

## 2025-02-25 ENCOUNTER — TELEPHONE (OUTPATIENT)
Dept: INTERNAL MEDICINE | Facility: HOSPITAL | Age: 55
End: 2025-02-25
Payer: COMMERCIAL

## 2025-02-25 LAB
EBV DNA SPEC NAA+PROBE-LOG#: NORMAL {LOG_COPIES}/ML
LABORATORY COMMENT REPORT: NOT DETECTED

## 2025-02-25 NOTE — SIGNIFICANT EVENT
Follow Up Phone Call    Outgoing phone call    Spoke to: Rafael Singh Relationship:self   Phone number: [phone]      Outcome: contacted patient/ family   Chief Complaint   Patient presents with    Back Pain     Sent by PCP for  MRI relating to leg weakness           Diagnosis:Not applicable    States he is about the same. Aware that if he digresses he should present to the ED [place]. Voiced understanding.

## 2025-02-25 NOTE — PROGRESS NOTES
Discharge Facility: [facility]  Discharge Diagnosis: Transverse Myelitis  Admission Date: 22 Feb 25  Discharge Date: 24 Feb 25    PCP Appointment Date: Pt declined  Specialist Appointment Date: None  Hospital Encounter and Summary Linked: Yes  ED to Hosp-Admission (Discharged) with Casey Piper DO; Karen Lacey DO (02/22/2025)     See discharge assessment below for further details     Wrap Up  Wrap Up Additional Comments: Pt stating he is about the same since his discharge. pt able to ambulate but is waiting for medications to kick in so he can start getting back to normal movements. pt has no questions on medications or discharge instructions at this time. pt wishing to contact office on his own to set up follow up appt at a later time. (2/25/2025  9:29 AM)  Call End Time: 0930 (2/25/2025  9:29 AM)    Engagement  Call Start Time: 0920 (Spoke with patient) (2/25/2025  9:29 AM)    Medications  Medications reviewed with patient/caregiver?: Yes (2/25/2025  9:29 AM)  Is the patient having any side effects they believe may be caused by any medication additions or changes?: No (2/25/2025  9:29 AM)  Does the patient have all medications ordered at discharge?: Yes (2/25/2025  9:29 AM)  Prescription Comments: pt able to obtain all meds without difficulty. (2/25/2025  9:29 AM)  Is the patient taking all medications as directed (includes completed medication regime)?: Yes (2/25/2025  9:29 AM)  Medication Comments: no questions on medications at this time (2/25/2025  9:29 AM)    Appointments  Does the patient have a primary care provider?: Yes (pt wishing to make his own appt) (2/25/2025  9:29 AM)  Has the patient kept scheduled appointments due by today?: Yes (2/25/2025  9:29 AM)    Self Management  What is the home health agency?: None (2/25/2025  9:29 AM)  Has home health visited the patient within 72 hours of discharge?: Not applicable (2/25/2025  9:29 AM)  What Durable Medical Equipment (DME) was ordered?:  None (2/25/2025  9:29 AM)    Patient Teaching  Does the patient have access to their discharge instructions?: Yes (2/25/2025  9:29 AM)  Care Management Interventions: Reviewed instructions with patient (2/25/2025  9:29 AM)  What is the patient's perception of their health status since discharge?: Same (2/25/2025  9:29 AM)  Is the patient/caregiver able to teach back the hierarchy of who to call/visit for symptoms/problems? PCP, Specialist, Home Health nurse, Urgent Care, ED, 911: Yes (2/25/2025  9:29 AM)  Patient/Caregiver Education Comments: None (2/25/2025  9:29 AM)

## 2025-02-25 NOTE — TELEPHONE ENCOUNTER
Patient discharged from hospital yesterday.    Patient called today to checkup on patient following discharge from hospital.  Patient is unavailable for call at time but his wife Rosa Isela answered phone and provided input on how he is doing.  She informs me symptoms pretty consistent not worsening though since discharge from hospital.  She is working on getting appointments arranged for him particularly at [facility] for further neurologic evaluation where he had one done in the past.   Aware that in case of an emergency and 911 is called and he is brought to nearest ER, I would recommend they inform ER provider about recommendations for evaluation at [facility] especially if neurology services are unavailable at the ER facility.  If noticing any progression or additional symptoms and not an emergency situation, recommend she or another individual if possible and safe to to take patient to [facility] main [place] [facility] for evaluation there to optimize his evaluation and management.  She voiced understanding.  No questions to answer from her perspective.  Wished them both well and to take care.     Casey Piper DO  Hospitalist, [facility]

## 2025-02-26 LAB
ADENOVIRUS RVP, VIRC: NOT DETECTED
ENTEROVIRUS/RHINOVIRUS RVP, VIRC: NOT DETECTED
HUMAN BOCAVIRUS RVP, VIRC: NOT DETECTED
HUMAN CORONAVIRUS RVP, VIRC: NOT DETECTED
INFLUENZA A , VIRC: NOT DETECTED
INFLUENZA A H1N1-09 , VIRC: NOT DETECTED
INFLUENZA B PCR, VIRC: NOT DETECTED
METAPNEUMOVIRUS , VIRC: NOT DETECTED
PARAINFLUENZA PCR, VIRC: NOT DETECTED
RSV PCR, RVP, VIRC: NOT DETECTED

## 2025-02-27 ENCOUNTER — HOSPITAL ENCOUNTER (INPATIENT)
Facility: HOSPITAL | Age: 55
End: 2025-02-27
Attending: STUDENT IN AN ORGANIZED HEALTH CARE EDUCATION/TRAINING PROGRAM | Admitting: STUDENT IN AN ORGANIZED HEALTH CARE EDUCATION/TRAINING PROGRAM
Payer: COMMERCIAL

## 2025-02-27 DIAGNOSIS — Z86.69 H/O OPTIC NEURITIS: ICD-10-CM

## 2025-02-27 DIAGNOSIS — G95.19 EDEMA OF SPINAL CORD: Primary | ICD-10-CM

## 2025-02-27 DIAGNOSIS — G95.9 SPINAL CORD LESION (MULTI): ICD-10-CM

## 2025-02-27 LAB
25(OH)D3 SERPL-MCNC: 20 NG/ML (ref 30–100)
ALBUMIN SERPL BCP-MCNC: 4.5 G/DL (ref 3.4–5)
ALP SERPL-CCNC: 63 U/L (ref 33–120)
ALT SERPL W P-5'-P-CCNC: 48 U/L (ref 10–52)
ANION GAP BLDV CALCULATED.4IONS-SCNC: 6 MMOL/L (ref 10–25)
ANION GAP SERPL CALC-SCNC: 14 MMOL/L (ref 10–20)
AST SERPL W P-5'-P-CCNC: 20 U/L (ref 9–39)
BASE EXCESS BLDV CALC-SCNC: 5.6 MMOL/L (ref -2–3)
BASOPHILS # BLD AUTO: 0.02 X10*3/UL (ref 0–0.1)
BASOPHILS NFR BLD AUTO: 0.2 %
BILIRUB SERPL-MCNC: 0.4 MG/DL (ref 0–1.2)
BODY TEMPERATURE: 37 DEGREES CELSIUS
BUN SERPL-MCNC: 15 MG/DL (ref 6–23)
CA-I BLDV-SCNC: 1.16 MMOL/L (ref 1.1–1.33)
CALCIUM SERPL-MCNC: 8.9 MG/DL (ref 8.6–10.6)
CHLORIDE BLDV-SCNC: 102 MMOL/L (ref 98–107)
CHLORIDE SERPL-SCNC: 100 MMOL/L (ref 98–107)
CO2 SERPL-SCNC: 29 MMOL/L (ref 21–32)
CREAT SERPL-MCNC: 0.85 MG/DL (ref 0.5–1.3)
EGFRCR SERPLBLD CKD-EPI 2021: >90 ML/MIN/1.73M*2
EOSINOPHIL # BLD AUTO: 0.12 X10*3/UL (ref 0–0.7)
EOSINOPHIL NFR BLD AUTO: 1.1 %
ERYTHROCYTE [DISTWIDTH] IN BLOOD BY AUTOMATED COUNT: 12.8 % (ref 11.5–14.5)
GLUCOSE BLDV-MCNC: 102 MG/DL (ref 74–99)
GLUCOSE SERPL-MCNC: 90 MG/DL (ref 74–99)
HCO3 BLDV-SCNC: 32.5 MMOL/L (ref 22–26)
HCT VFR BLD AUTO: 43.6 % (ref 41–52)
HCT VFR BLD EST: 47 % (ref 41–52)
HGB BLD-MCNC: 15.4 G/DL (ref 13.5–17.5)
HGB BLDV-MCNC: 15.5 G/DL (ref 13.5–17.5)
IMM GRANULOCYTES # BLD AUTO: 0.16 X10*3/UL (ref 0–0.7)
IMM GRANULOCYTES NFR BLD AUTO: 1.4 % (ref 0–0.9)
INHALED O2 CONCENTRATION: 21 %
LACTATE BLDV-SCNC: 1.9 MMOL/L (ref 0.4–2)
LYMPHOCYTES # BLD AUTO: 2.77 X10*3/UL (ref 1.2–4.8)
LYMPHOCYTES NFR BLD AUTO: 24.6 %
MCH RBC QN AUTO: 28.8 PG (ref 26–34)
MCHC RBC AUTO-ENTMCNC: 35.3 G/DL (ref 32–36)
MCV RBC AUTO: 82 FL (ref 80–100)
MONOCYTES # BLD AUTO: 0.66 X10*3/UL (ref 0.1–1)
MONOCYTES NFR BLD AUTO: 5.9 %
NEUTROPHILS # BLD AUTO: 7.52 X10*3/UL (ref 1.2–7.7)
NEUTROPHILS NFR BLD AUTO: 66.8 %
NRBC BLD-RTO: 0 /100 WBCS (ref 0–0)
OXYHGB MFR BLDV: 24.5 % (ref 45–75)
PCO2 BLDV: 55 MM HG (ref 41–51)
PH BLDV: 7.38 PH (ref 7.33–7.43)
PLATELET # BLD AUTO: 403 X10*3/UL (ref 150–450)
PO2 BLDV: 24 MM HG (ref 35–45)
POTASSIUM BLDV-SCNC: 3.9 MMOL/L (ref 3.5–5.3)
POTASSIUM SERPL-SCNC: 3.7 MMOL/L (ref 3.5–5.3)
PROT SERPL-MCNC: 7.1 G/DL (ref 6.4–8.2)
RBC # BLD AUTO: 5.35 X10*6/UL (ref 4.5–5.9)
SAO2 % BLDV: 25 % (ref 45–75)
SODIUM BLDV-SCNC: 137 MMOL/L (ref 136–145)
SODIUM SERPL-SCNC: 139 MMOL/L (ref 136–145)
TREPONEMA PALLIDUM IGG+IGM AB [PRESENCE] IN SERUM OR PLASMA BY IMMUNOASSAY: NONREACTIVE
WBC # BLD AUTO: 11.3 X10*3/UL (ref 4.4–11.3)

## 2025-02-27 PROCEDURE — 84132 ASSAY OF SERUM POTASSIUM: CPT | Performed by: EMERGENCY MEDICINE

## 2025-02-27 PROCEDURE — 99223 1ST HOSP IP/OBS HIGH 75: CPT

## 2025-02-27 PROCEDURE — 86704 HEP B CORE ANTIBODY TOTAL: CPT | Performed by: STUDENT IN AN ORGANIZED HEALTH CARE EDUCATION/TRAINING PROGRAM

## 2025-02-27 PROCEDURE — 86787 VARICELLA-ZOSTER ANTIBODY: CPT | Performed by: STUDENT IN AN ORGANIZED HEALTH CARE EDUCATION/TRAINING PROGRAM

## 2025-02-27 PROCEDURE — 36415 COLL VENOUS BLD VENIPUNCTURE: CPT | Performed by: EMERGENCY MEDICINE

## 2025-02-27 PROCEDURE — 82435 ASSAY OF BLOOD CHLORIDE: CPT | Performed by: EMERGENCY MEDICINE

## 2025-02-27 PROCEDURE — 1100000001 HC PRIVATE ROOM DAILY

## 2025-02-27 PROCEDURE — 85025 COMPLETE CBC W/AUTO DIFF WBC: CPT | Performed by: EMERGENCY MEDICINE

## 2025-02-27 PROCEDURE — 86780 TREPONEMA PALLIDUM: CPT | Performed by: STUDENT IN AN ORGANIZED HEALTH CARE EDUCATION/TRAINING PROGRAM

## 2025-02-27 PROCEDURE — 82330 ASSAY OF CALCIUM: CPT | Performed by: EMERGENCY MEDICINE

## 2025-02-27 PROCEDURE — 36415 COLL VENOUS BLD VENIPUNCTURE: CPT | Performed by: STUDENT IN AN ORGANIZED HEALTH CARE EDUCATION/TRAINING PROGRAM

## 2025-02-27 PROCEDURE — 86706 HEP B SURFACE ANTIBODY: CPT | Performed by: STUDENT IN AN ORGANIZED HEALTH CARE EDUCATION/TRAINING PROGRAM

## 2025-02-27 PROCEDURE — 99285 EMERGENCY DEPT VISIT HI MDM: CPT | Performed by: STUDENT IN AN ORGANIZED HEALTH CARE EDUCATION/TRAINING PROGRAM

## 2025-02-27 PROCEDURE — 82525 ASSAY OF COPPER: CPT | Performed by: STUDENT IN AN ORGANIZED HEALTH CARE EDUCATION/TRAINING PROGRAM

## 2025-02-27 PROCEDURE — 87340 HEPATITIS B SURFACE AG IA: CPT | Performed by: STUDENT IN AN ORGANIZED HEALTH CARE EDUCATION/TRAINING PROGRAM

## 2025-02-27 RX ORDER — PREDNISONE 20 MG/1
60 TABLET ORAL DAILY
Status: DISPENSED | OUTPATIENT
Start: 2025-02-28

## 2025-02-27 RX ORDER — PANTOPRAZOLE SODIUM 40 MG/10ML
40 INJECTION, POWDER, LYOPHILIZED, FOR SOLUTION INTRAVENOUS
Status: ACTIVE | OUTPATIENT
Start: 2025-02-28

## 2025-02-27 RX ORDER — CHOLECALCIFEROL (VITAMIN D3) 25 MCG
5000 TABLET ORAL DAILY
Status: DISPENSED | OUTPATIENT
Start: 2025-02-28

## 2025-02-27 RX ORDER — PANTOPRAZOLE SODIUM 40 MG/1
40 TABLET, DELAYED RELEASE ORAL
Status: DISPENSED | OUTPATIENT
Start: 2025-02-28

## 2025-02-27 RX ORDER — POLYETHYLENE GLYCOL 3350 17 G/17G
17 POWDER, FOR SOLUTION ORAL DAILY
Status: ACTIVE | OUTPATIENT
Start: 2025-02-28

## 2025-02-27 RX ORDER — PHENOL/SODIUM PHENOLATE
20 AEROSOL, SPRAY (ML) MUCOUS MEMBRANE DAILY
Status: ON HOLD | COMMUNITY

## 2025-02-27 SDOH — SOCIAL STABILITY: SOCIAL INSECURITY: WERE YOU ABLE TO COMPLETE ALL THE BEHAVIORAL HEALTH SCREENINGS?: YES

## 2025-02-27 SDOH — SOCIAL STABILITY: SOCIAL INSECURITY: WITHIN THE LAST YEAR, HAVE YOU BEEN HUMILIATED OR EMOTIONALLY ABUSED IN OTHER WAYS BY YOUR PARTNER OR EX-PARTNER?: NO

## 2025-02-27 SDOH — SOCIAL STABILITY: SOCIAL INSECURITY: DO YOU FEEL ANYONE HAS EXPLOITED OR TAKEN ADVANTAGE OF YOU FINANCIALLY OR OF YOUR PERSONAL PROPERTY?: NO

## 2025-02-27 SDOH — ECONOMIC STABILITY: HOUSING INSECURITY: IN THE LAST 12 MONTHS, WAS THERE A TIME WHEN YOU WERE NOT ABLE TO PAY THE MORTGAGE OR RENT ON TIME?: NO

## 2025-02-27 SDOH — SOCIAL STABILITY: SOCIAL INSECURITY: DO YOU FEEL UNSAFE GOING BACK TO THE PLACE WHERE YOU ARE LIVING?: NO

## 2025-02-27 SDOH — ECONOMIC STABILITY: FOOD INSECURITY: HOW HARD IS IT FOR YOU TO PAY FOR THE VERY BASICS LIKE FOOD, HOUSING, MEDICAL CARE, AND HEATING?: NOT VERY HARD

## 2025-02-27 SDOH — ECONOMIC STABILITY: HOUSING INSECURITY: IN THE PAST 12 MONTHS, HOW MANY TIMES HAVE YOU MOVED WHERE YOU WERE LIVING?: 0

## 2025-02-27 SDOH — ECONOMIC STABILITY: FOOD INSECURITY: WITHIN THE PAST 12 MONTHS, YOU WORRIED THAT YOUR FOOD WOULD RUN OUT BEFORE YOU GOT THE MONEY TO BUY MORE.: NEVER TRUE

## 2025-02-27 SDOH — SOCIAL STABILITY: SOCIAL INSECURITY: WITHIN THE LAST YEAR, HAVE YOU BEEN AFRAID OF YOUR PARTNER OR EX-PARTNER?: NO

## 2025-02-27 SDOH — ECONOMIC STABILITY: INCOME INSECURITY: IN THE PAST 12 MONTHS HAS THE ELECTRIC, GAS, OIL, OR WATER COMPANY THREATENED TO SHUT OFF SERVICES IN YOUR HOME?: NO

## 2025-02-27 SDOH — ECONOMIC STABILITY: FOOD INSECURITY: WITHIN THE PAST 12 MONTHS, THE FOOD YOU BOUGHT JUST DIDN'T LAST AND YOU DIDN'T HAVE MONEY TO GET MORE.: NEVER TRUE

## 2025-02-27 SDOH — ECONOMIC STABILITY: TRANSPORTATION INSECURITY: IN THE PAST 12 MONTHS, HAS LACK OF TRANSPORTATION KEPT YOU FROM MEDICAL APPOINTMENTS OR FROM GETTING MEDICATIONS?: NO

## 2025-02-27 SDOH — SOCIAL STABILITY: SOCIAL INSECURITY: ABUSE: ADULT

## 2025-02-27 SDOH — SOCIAL STABILITY: SOCIAL INSECURITY: ARE THERE ANY APPARENT SIGNS OF INJURIES/BEHAVIORS THAT COULD BE RELATED TO ABUSE/NEGLECT?: NO

## 2025-02-27 SDOH — SOCIAL STABILITY: SOCIAL INSECURITY: HAVE YOU HAD ANY THOUGHTS OF HARMING ANYONE ELSE?: NO

## 2025-02-27 SDOH — SOCIAL STABILITY: SOCIAL INSECURITY: HAS ANYONE EVER THREATENED TO HURT YOUR FAMILY OR YOUR PETS?: NO

## 2025-02-27 SDOH — SOCIAL STABILITY: SOCIAL INSECURITY: ARE YOU OR HAVE YOU BEEN THREATENED OR ABUSED PHYSICALLY, EMOTIONALLY, OR SEXUALLY BY ANYONE?: NO

## 2025-02-27 SDOH — ECONOMIC STABILITY: HOUSING INSECURITY: AT ANY TIME IN THE PAST 12 MONTHS, WERE YOU HOMELESS OR LIVING IN A SHELTER (INCLUDING NOW)?: NO

## 2025-02-27 SDOH — SOCIAL STABILITY: SOCIAL INSECURITY: HAVE YOU HAD THOUGHTS OF HARMING ANYONE ELSE?: NO

## 2025-02-27 SDOH — SOCIAL STABILITY: SOCIAL INSECURITY: DOES ANYONE TRY TO KEEP YOU FROM HAVING/CONTACTING OTHER FRIENDS OR DOING THINGS OUTSIDE YOUR HOME?: NO

## 2025-02-27 ASSESSMENT — LIFESTYLE VARIABLES
TOTAL SCORE: 0
AUDIT-C TOTAL SCORE: 3
HAVE PEOPLE ANNOYED YOU BY CRITICIZING YOUR DRINKING: NO
AUDIT-C TOTAL SCORE: 3
HAVE YOU EVER FELT YOU SHOULD CUT DOWN ON YOUR DRINKING: NO
EVER HAD A DRINK FIRST THING IN THE MORNING TO STEADY YOUR NERVES TO GET RID OF A HANGOVER: NO
EVER FELT BAD OR GUILTY ABOUT YOUR DRINKING: NO
HOW OFTEN DO YOU HAVE A DRINK CONTAINING ALCOHOL: 2-4 TIMES A MONTH
HOW MANY STANDARD DRINKS CONTAINING ALCOHOL DO YOU HAVE ON A TYPICAL DAY: 1 OR 2
HOW OFTEN DO YOU HAVE 6 OR MORE DRINKS ON ONE OCCASION: LESS THAN MONTHLY
SKIP TO QUESTIONS 9-10: 0

## 2025-02-27 ASSESSMENT — ACTIVITIES OF DAILY LIVING (ADL)
TOILETING: INDEPENDENT
DRESSING YOURSELF: INDEPENDENT
JUDGMENT_ADEQUATE_SAFELY_COMPLETE_DAILY_ACTIVITIES: YES
ADEQUATE_TO_COMPLETE_ADL: YES
BATHING: INDEPENDENT
HEARING - RIGHT EAR: FUNCTIONAL
WALKS IN HOME: INDEPENDENT
HEARING - LEFT EAR: FUNCTIONAL
PATIENT'S MEMORY ADEQUATE TO SAFELY COMPLETE DAILY ACTIVITIES?: YES
FEEDING YOURSELF: INDEPENDENT
GROOMING: INDEPENDENT
LACK_OF_TRANSPORTATION: NO

## 2025-02-27 ASSESSMENT — PAIN DESCRIPTION - PROGRESSION: CLINICAL_PROGRESSION: NOT CHANGED

## 2025-02-27 ASSESSMENT — PATIENT HEALTH QUESTIONNAIRE - PHQ9
1. LITTLE INTEREST OR PLEASURE IN DOING THINGS: NOT AT ALL
SUM OF ALL RESPONSES TO PHQ9 QUESTIONS 1 & 2: 0
2. FEELING DOWN, DEPRESSED OR HOPELESS: NOT AT ALL

## 2025-02-27 ASSESSMENT — COGNITIVE AND FUNCTIONAL STATUS - GENERAL
PATIENT BASELINE BEDBOUND: NO
MOBILITY SCORE: 24
DAILY ACTIVITIY SCORE: 24

## 2025-02-27 ASSESSMENT — PAIN - FUNCTIONAL ASSESSMENT
PAIN_FUNCTIONAL_ASSESSMENT: 0-10
PAIN_FUNCTIONAL_ASSESSMENT: 0-10

## 2025-02-27 ASSESSMENT — PAIN SCALES - GENERAL
PAINLEVEL_OUTOF10: 0 - NO PAIN

## 2025-02-27 NOTE — ED TRIAGE NOTES
mri completed on Saturday. Told fluid on spine. Was admitted at [facility] Saturday to Monday. Told to come to main campus if sx did not improve. Difficulty ambulating. Bilateral foot numbness/tingling. No unilateral weakness.

## 2025-02-28 ENCOUNTER — APPOINTMENT (OUTPATIENT)
Dept: RADIOLOGY | Facility: HOSPITAL | Age: 55
End: 2025-02-28
Payer: COMMERCIAL

## 2025-02-28 LAB
APTT PPP: 32 SECONDS (ref 26–36)
INR PPP: 1.1 (ref 0.9–1.1)
PROTHROMBIN TIME: 11.8 SECONDS (ref 9.8–12.4)

## 2025-02-28 PROCEDURE — 86053 AQAPRN-4 ANTB FLO CYTMTRY EA: CPT

## 2025-02-28 PROCEDURE — 2500000004 HC RX 250 GENERAL PHARMACY W/ HCPCS (ALT 636 FOR OP/ED): Mod: SE

## 2025-02-28 PROCEDURE — 70553 MRI BRAIN STEM W/O & W/DYE: CPT

## 2025-02-28 PROCEDURE — 97161 PT EVAL LOW COMPLEX 20 MIN: CPT | Mod: GP

## 2025-02-28 PROCEDURE — 1100000001 HC PRIVATE ROOM DAILY

## 2025-02-28 PROCEDURE — 85730 THROMBOPLASTIN TIME PARTIAL: CPT

## 2025-02-28 PROCEDURE — 72156 MRI NECK SPINE W/O & W/DYE: CPT | Performed by: RADIOLOGY

## 2025-02-28 PROCEDURE — 36415 COLL VENOUS BLD VENIPUNCTURE: CPT

## 2025-02-28 PROCEDURE — 2550000001 HC RX 255 CONTRASTS: Mod: SE | Performed by: STUDENT IN AN ORGANIZED HEALTH CARE EDUCATION/TRAINING PROGRAM

## 2025-02-28 PROCEDURE — A9575 INJ GADOTERATE MEGLUMI 0.1ML: HCPCS | Mod: SE | Performed by: STUDENT IN AN ORGANIZED HEALTH CARE EDUCATION/TRAINING PROGRAM

## 2025-02-28 PROCEDURE — 86256 FLUORESCENT ANTIBODY TITER: CPT

## 2025-02-28 PROCEDURE — 2500000001 HC RX 250 WO HCPCS SELF ADMINISTERED DRUGS (ALT 637 FOR MEDICARE OP): Mod: SE

## 2025-02-28 PROCEDURE — 72156 MRI NECK SPINE W/O & W/DYE: CPT

## 2025-02-28 PROCEDURE — 99255 IP/OBS CONSLTJ NEW/EST HI 80: CPT | Performed by: INTERNAL MEDICINE

## 2025-02-28 PROCEDURE — 2500000004 HC RX 250 GENERAL PHARMACY W/ HCPCS (ALT 636 FOR OP/ED): Mod: SE | Performed by: STUDENT IN AN ORGANIZED HEALTH CARE EDUCATION/TRAINING PROGRAM

## 2025-02-28 PROCEDURE — 85610 PROTHROMBIN TIME: CPT

## 2025-02-28 RX ORDER — LORAZEPAM 0.5 MG/1
0.5 TABLET ORAL ONCE
Status: DISCONTINUED | OUTPATIENT
Start: 2025-02-28 | End: 2025-02-28

## 2025-02-28 RX ORDER — ENOXAPARIN SODIUM 100 MG/ML
40 INJECTION SUBCUTANEOUS DAILY
Status: ACTIVE | OUTPATIENT
Start: 2025-02-28

## 2025-02-28 RX ORDER — GADOTERATE MEGLUMINE 376.9 MG/ML
20 INJECTION INTRAVENOUS
Status: COMPLETED | OUTPATIENT
Start: 2025-02-28 | End: 2025-02-28

## 2025-02-28 RX ORDER — LORAZEPAM 2 MG/ML
1 INJECTION INTRAMUSCULAR ONCE
Status: COMPLETED | OUTPATIENT
Start: 2025-02-28 | End: 2025-02-28

## 2025-02-28 RX ADMIN — PANTOPRAZOLE SODIUM 40 MG: 40 TABLET, DELAYED RELEASE ORAL at 05:43

## 2025-02-28 RX ADMIN — PREDNISONE 60 MG: 20 TABLET ORAL at 08:04

## 2025-02-28 RX ADMIN — LORAZEPAM 1 MG: 2 INJECTION INTRAMUSCULAR; INTRAVENOUS at 15:36

## 2025-02-28 RX ADMIN — GADOTERATE MEGLUMINE 17 ML: 376.9 INJECTION INTRAVENOUS at 17:03

## 2025-02-28 RX ADMIN — Medication 5000 UNITS: at 08:03

## 2025-02-28 ASSESSMENT — PAIN - FUNCTIONAL ASSESSMENT
PAIN_FUNCTIONAL_ASSESSMENT: 0-10

## 2025-02-28 ASSESSMENT — COGNITIVE AND FUNCTIONAL STATUS - GENERAL
MOBILITY SCORE: 22
CLIMB 3 TO 5 STEPS WITH RAILING: A LITTLE
MOBILITY SCORE: 24
DAILY ACTIVITIY SCORE: 24
WALKING IN HOSPITAL ROOM: A LITTLE

## 2025-02-28 ASSESSMENT — PAIN SCALES - GENERAL
PAINLEVEL_OUTOF10: 0 - NO PAIN

## 2025-02-28 NOTE — CARE PLAN
The clinical goals for the shift include safety, comfort, plan of care    Patient oriented x4, denies pain, vitals WNL at baseline. Still endorses BLE numbness and tingling, otherwise neuro exam normal. Ambulatory ad jose as tolerated. Plan for MRI brain/c-spine, lumbar puncture, and PET scan. Patient and family updated and agreeable to plan of care at this time.      Problem: Fall/Injury  Goal: Not fall by end of shift  Outcome: Progressing  Goal: Be free from injury by end of the shift  Outcome: Progressing  Goal: Verbalize understanding of personal risk factors for fall in the hospital  Outcome: Progressing     Problem: Pain - Adult  Goal: Verbalizes/displays adequate comfort level or baseline comfort level  Outcome: Progressing     Problem: Safety - Adult  Goal: Free from fall injury  Outcome: Progressing     Problem: Discharge Planning  Goal: Discharge to home or other facility with appropriate resources  Outcome: Progressing     Problem: Chronic Conditions and Co-morbidities  Goal: Patient's chronic conditions and co-morbidity symptoms are monitored and maintained or improved  Outcome: Progressing     Problem: Nutrition  Goal: Nutrient intake appropriate for maintaining nutritional needs  Outcome: Progressing

## 2025-02-28 NOTE — H&P
History Of Present Illness  Rafael Singh is a 54 y.o. male no pertinent PMH presenting with abnormal spinal cord signal.    Patient reports that he was shoveling very much on 2/19 and then had some lower back pain that he did not think much about. As the week went on his legs started to feel heavy. He describes the sensation as them falling asleep without pins and needles. Walking was difficult and was described like walking around drunk, like he could not tell where his legs were going. He endorses some minor difficulty with putting socks and shoes on.     Patient did endorses some intermittent burning pain in the bilateral medial thighs that has gone away. Although patient reported intact sensation in his legs and groin area, previous documentation reports subjective numbness in his legs. He reports normal bowel and bladder movements but sometimes feels like he is not sure when he is done urinating.     Patient was sent to [facility] on 2/22 by PCP. MRI T spine wo IV contrast was obtained which showed hyperintensity around T12 on STIR. Neurology was consulted and a repeat MRI of the thoracic spine was obtained with contrast which showed a 0.7x0.6x0.4cm signal enhancement at T11-T12. Patient received 3 days of IVMP 1g and acyclovir (history of HSV2 infection). Original plan according to patient's discharge instructions and Neurology note was to obtain repeat MRI in 4-6 weeks to look for improvement. Patient reports that he was told to go to main campus if his symptoms did not improve, which they did not.     Patient denies recent dry eyes, rashes, orthostasis, anorexia, unintentional weight loss (on Trulicity for intentional weight loss).    He reports in 2020 he had an episode of blurry vision in one eye. He was not sure about pain with eye movements. Reports that he followed up at the [facility] without intervention.    Patient had the flu at the beginning of the month and was taking tamiflu. The month before he  had a chronic cough. Denies any history of autoimmune conditions. He does not know about his family history because he was adopted. He has three children that are healthy, two have hypothyroid which they believe are congenital. Thyroid issues are common on the mother's side of the family. He stopped smoking 12 years ago, drinks alcohol on average 3 times per week, and does not use any other recreational drugs.    Per chart review:  PRINCIPAL NEUROLOGIC DIAGNOSIS: Bilateral optic neuritis    DISEASE SUMMARY  Date of onset: June 2020  Date of diagnosis of MS: NA  Disease course at onset: Monophasic  Current disease course: Monophasic  Previous disease therapies: NA  Current disease therapy: NA  Most recent MRI brain: 6/24/20  Most recent MRI cervical spine: NA  CSF: NA  JCV serology result and date: NA    Labs 7/2/2020  MOG IgG negative  AQP4 negative  Vitamin D 29.7 (low)  HAILEY unremarkable  Proteinase 3 Ab <0.2  ANCA panel negative  Lyme IgG/IgM negative  Syphilis Nonreactive  Copper 86    Folate 13.2  B12 685  ACE/Angiotensin 28    MRI 6/24/2020 w and wo IV contrast   Right greater than left optic neuritis    6/18/2020 Optic Disc Photo with disc edema in both eyes    Past Medical History  Past Medical History:   Diagnosis Date    Acute bronchitis, unspecified 06/15/2017    Acute bronchitis    Other conditions influencing health status     No significant past medical history    Prostatitis 06/19/2023     Surgical History  No past surgical history on file.  Social History  Social History     Tobacco Use    Smoking status: Former     Types: Cigarettes    Smokeless tobacco: Former   Substance Use Topics    Alcohol use: Yes    Drug use: Never     Allergies  Amoxicillin and Pollen extracts  (Not in a hospital admission)      Review of Systems  Neurological Exam  Physical Exam  Last Recorded Vitals  Blood pressure 149/86, pulse 64, temperature 36.6 °C (97.9 °F), temperature source Temporal, resp. rate 17, height  "1.753 m (5' 9\"), weight 83.9 kg (185 lb), SpO2 98%.    Physical Exam:  General: No acute distress  Skin: warm and dry  Lungs: No increased work of breathing or cough, on RA  Extremities: ROM intact    Neurological Exam:  MENTAL STATUS:  General appearance: No acute distress  Orientation: A&Ox4  Language: Expression, repetition, naming, comprehension intact  Follows complex commands across midline    CRANIAL NERVES:  - II/III: PERRL  - II:  Visual fields intact to confrontation bilaterally tested individually and together  - III, IV, VI: EOM full to pursuit without nystagmus  - V: V1-V3 sensation intact bilaterally  - VII: Face muscles symmetric with smile and eye closure  - VIII: Intact to interview  - IX, X: Palate elevated symmetrically bilaterally, no hoarseness  - XI: 5/5 strength on shoulder shrugging bilaterally  - XII: Tongue midline without atrophy or fasciculation    MOTOR: Tone and bulk normal in all extremities. No tremor, no abnormal movements.    STRENGTH: R L  Deltoid  5 5  Biceps  5 5  Triceps  5 5    5 5  Hip abduction 5 5  Hip adduction 5 5  Hip flexion 5 5  Quadriceps 5 5  Hamstrings 5 5  DorsiFlex 5 5  PlantarFlex 5 5    REFLEXES: R L  Biceps  +2 +2  Triceps  +2 +2  Brachioradialis +2 +2  Patellar  +3 +3  Achilles +2 +2    No Hurst, crossed adductors, Babinski, or clonus    COORDINATION: Intact on finger to nose bl, intact on heel to shin bl  SENSORY: Intact to light touch, pin prick, vibration in bl UE and LE. No sensory level with pinprick in the front or vibration in the back. Slight decreased sensation to cold in the feet compared to the shins and up.  PROPRIOCEPTION:  Intact in toes and fingers bilaterally  ROMBERG: Negative  GAIT: Very cautious gait with slight wobble however able to walk efficiently and catch himself if needed    Relevant Results  Results from last 72 hours   Lab Units 02/27/25  1757   WBC AUTO x10*3/uL 11.3   NRBC AUTO /100 WBCs 0.0   RBC AUTO x10*6/uL 5.35 "   HEMOGLOBIN g/dL 15.4   HEMATOCRIT % 43.6   MCV fL 82   MCH pg 28.8   MCHC g/dL 35.3   RDW % 12.8   PLATELETS AUTO x10*3/uL 403   NEUTROS PCT AUTO % 66.8   IG PCT AUTO % 1.4*   LYMPHS PCT AUTO % 24.6   MONOS PCT AUTO % 5.9   EOS PCT AUTO % 1.1   BASOS PCT AUTO % 0.2   NEUTROS ABS x10*3/uL 7.52   IG AUTO x10*3/uL 0.16   LYMPHS ABS AUTO x10*3/uL 2.77   MONOS ABS AUTO x10*3/uL 0.66   EOS ABS AUTO x10*3/uL 0.12   BASOS ABS AUTO x10*3/uL 0.02      Results from last 72 hours   Lab Units 02/27/25  1757   GLUCOSE mg/dL 90   SODIUM mmol/L 139   POTASSIUM mmol/L 3.7   CHLORIDE mmol/L 100   CO2 mmol/L 29   ANION GAP mmol/L 14   BUN mg/dL 15   CREATININE mg/dL 0.85   EGFR mL/min/1.73m*2 >90   CALCIUM mg/dL 8.9   ALBUMIN g/dL 4.5      Results from last 72 hours   Lab Units 02/27/25  1757   ALK PHOS U/L 63   BILIRUBIN TOTAL mg/dL 0.4   PROTEIN TOTAL g/dL 7.1   ALT U/L 48   AST U/L 20   ALBUMIN g/dL 4.5      YOSELIN with reflex to HAILEY 2/22/25 normal  B12 2/22/25 601  HIV 1/2 2/22/25 nonreactive  HSV-1/2 in blood 2/22/25 nonreactive  CRP 2/22/25 0.31  ESR 2/22/25 8    MRI Thoracic spine w and wo IV contrast 2/22/25  Within the dorsal aspect of the lower spinal cord at T11-T12 disc  space level, there is a 0.7 cm x 0.6 cm x 0.4 cm focus of  non-masslike enhancement within the central-dorsal midline of the  spinal cord. The lesion appears more discrete on sagittal imaging but  on axial images the margins are with cp and ill-defined. There is  intramedullary cord edema spanning above and below the lesion over  length of 3.8 cm. There is minimal focal posterior cord expansion  level the enhancement. No additional intramedullary lesions are seen.  No evidence of abnormal leptomeningeal enhancement.    CT CAP w IV contrast 2/22/25  CHEST:  1.  Remote granulomatous disease.      ABDOMEN-PELVIS:  1.  3.5 cm simple right renal cyst with 5 mm simple left renal cyst.  2. Fat containing umbilical hernia.  3. Degenerative disc disease at the L5-S1  level.    MR Lumbar spine wo IV contrast 2/22/25  The limited images through the lower thoracic spinal cord demonstrate  abnormal bright intramedullary signal on the STIR and T2 weighted  images within the thoracic spinal cord extending from the T12 level  cranially through the obtained field of view at the T11 level  suspicious for underlying spinal cord edema. There is a questionable  nodular lesion noted within the thoracic spinal cord at the inferior  T11 level as seen on axial STIR and T2 slice 13 of 27. A dedicated  MRI of the thoracic spine with and without gadolinium would be  recommended for further evaluation.    CT chest wo IV contrast 9/30/24  1.  Findings compatible with sequela of prior granulomatous process  including calcified perifissural lymph nodes within the left lung and  calcified mediastinal and left hilar lymph nodes.  2. No acute cardiopulmonary process on current exam. No discrete  suspicious noncalcified solid pulmonary nodules.       Assessment/Plan   Assessment & Plan  Edema of spinal cord      Rafael Singh is a 54 y.o. male no pertinent PMH presenting with abnormal spinal cord signal. History is notable for sensory deficits and potential trouble with urination. Past history is more remarkable for prior bilateral (R>L) optic neuritis which the patient was seen at the [facility] for once. Patient has not followed up since. Neurological exam was mostly unremarkable aside from unsteadiness with gait. MRI of the T spine shows a small enhancing lesion around T11-12 with edema. Differential includes autoimmune causes including MS, NMO, MOG, sarcoidosis vs mass. Patient lacks signs of cauda equina at this time. Patient will be admitted for work up including imaging, lumbar puncture. Inpatient admission is important especially in the setting of prior optic neuritis and potential NMO which should be fully worked up with repeat serum testing. Will start patient on steroids in the morning with  a PPI.     #Spinal cord lesion  #Concern for autoimmune condition  - admit to neurology, floor  - MRI brain and c spine w and wo IV contrast  - LP in the AM  - Serum demyelinating panel  - Vitamin D level  - start Prednisone 60mg with PPI in the AM  - Vitamin D supplementation  - consult Pulmonology in the AM due to concern for granulomatis findings on CT chest    F: prn  E: prn  N: Regular  A: PIV    GI: PPI  DVT PPX: SCDs (holding pharmacological DVT PPX for LP)    CODE STATUS: FULL CODE (confirmed on admission)  NOK: Rosa Isela Singh (spouse) [phone]    Patient to be fully staffed in the AM with Dr Fried.    Julia Fernandes MD  Department of Neurology, PGY-2  General [id number]              Julia Fernandes MD

## 2025-02-28 NOTE — PROGRESS NOTES
Pharmacy Medication History Review    Rafael Singh is a 54 y.o. male admitted for Edema of spinal cord. Pharmacy reviewed the patient's [id number] medications and allergies for accuracy.    Medications ADDED  N/A  Medications CHANGED  Fexofenadine 180 mg --> as needed for allergies  Montelukast 10 mg --> as needed for allergies  Omeprazole --> 20 mg OTC tablet  Trulicity 4.5 mg/0.5 mL --> inject 4.5 mg under the skin once weekly on Thursday   Medications REMOVED/NOT TAKING   N/A     The list below reflects the updated PTA list.   Prior to Admission Medications   Prescriptions Last Dose Informant   Trulicity 4.5 mg/0.5 mL pen injector Past Month Self   Sig: Inject 4.5 mg under the skin every 7 days. On Thursday   acyclovir (Zovirax) 400 mg tablet     Sig: Take 2 tablets (800 mg) by mouth 3 times a day for 2 days. As needed for outbreak   ascorbic acid (Vitamin C) 100 mg tablet Past Week Self   Sig: Take 1 tablet (100 mg) by mouth once daily.   fexofenadine (Allegra) 180 mg tablet  Self   Sig: Take 1 tablet (180 mg) by mouth once daily as needed (allergies).   montelukast (Singulair) 10 mg tablet Past Week Self   Sig: Take 1 tablet (10 mg) by mouth as needed at bedtime (allergies).  Last dispensed from [facility] August 2023    omeprazole (PriLOSEC) 20 mg tablet,delayed release (DR/EC) EC tablet  Self   Sig: Take 1 tablet (20 mg) by mouth once daily. Do not crush or chew.      Facility-Administered Medications: None       The list below reflects the updated allergy list. Please review each documented allergy for additional clarification and justification.  Allergies  Reviewed by Aramis Atkins RN on 2/27/2025        Severity Reactions Comments    Amoxicillin Low Other Hot flashes    Pollen Extracts Low Other Eyes burning, and mucus build up            Patient accepts M2B at discharge. Pharmacy has been updated to [facility].    Sources  [facility]  Pharmacy dispense history  Patient interview Good historian  [facility]  in [place]       Additional Comments  See highlighted selection in above table     Nathaniel Duran, PharmD  [facility]  [id number]  [facility], Room# [address]  [place] [address]  Phone: [phone]  Please reach out via Secure Chat for questions, or if no response call [employer] or [employer]

## 2025-02-28 NOTE — PROGRESS NOTES
Patient has appt, will address once patient is seen. Physical Therapy    Physical Therapy Evaluation    Patient Name: Rafael Singh  MRN: [id number]  Department: [place] [address]  Room: [address] [place] [address]  Today's Date: 2/28/2025   Time Calculation  Start Time: 1520  Stop Time: 1532  Time Calculation (min): 12 min    Assessment/Plan   PT Assessment  PT Assessment Results: Impaired balance, Impaired sensation  Rehab Prognosis: Good  Barriers to Discharge Home: No anticipated barriers  Evaluation/Treatment Tolerance: Patient tolerated treatment well  Medical Staff Made Aware: Yes  Strengths: Attitude of self, Premorbid level of function  End of Session Communication: Bedside nurse  Assessment Comment: 53 yo male, typ Ind with no AD.  Pt admitted with BLE parasthesia, c/f autoimmune condition.  Pt currently limited by deficits in sensation, and resulting deficits in balance, with some gait deviations noted.  Would benefit from cont PT while in hosp to further assess community distance ambulation, and stairs, and facilitate return to PLOF.  End of Session Patient Position: Bed, 2 rail up, Alarm off, not on at start of session (transport cart)  IP OR SWING BED PT PLAN  Inpatient or Swing Bed: Inpatient  PT Plan  Treatment/Interventions: Gait training, Stair training, Balance training, Therapeutic exercise, Therapeutic activity  PT Plan: Ongoing PT  PT Frequency: 2 times per week  PT Discharge Recommendations: Low intensity level of continued care (May progress to No Needs)  Equipment Recommended upon Discharge:  (None)  PT Recommended Transfer Status: Stand by assist  PT - OK to Discharge: Yes (Meaning pt has been evaluated and d/c recc is in place.  Pt is still pending further medical work up)    Subjective   General Visit Information:  General  Reason for Referral: BLE parasthesia found to have  SC lesion, c/f autoimmune condition  Past Medical History Relevant to Rehab: optic neuritis  Family/Caregiver Present: Yes  Caregiver Feedback: wife present and supportive  Prior to Session  "Communication: Bedside nurse  Patient Position Received: Bed, 3 rail up, Alarm off, not on at start of session, Alarm off, caregiver present  General Comment: Supine. Pt pleasant, cooperative and agreeable to PT. Session limited by transport arriving to take pt to MRI.  Pt able to perform transfers and ambulate with no AD and distant supv.  Tolerated well.  Home Living:  Home Living  Type of Home: House  Lives With: Spouse, Adult children (26 yo son)  Home Adaptive Equipment: None  Home Layout: Multi-level, Stairs to alternate level with rails, Able to live on main level with bedroom/bathroom  Alternate Level Stairs-Number of Steps: 12  Home Access: Stairs to enter without rails  Entrance Stairs-Number of Steps: 3  Prior Level of Function:  Prior Function Per Pt/Caregiver Report  Level of Bandon: Independent with ADLs and functional transfers  Ambulatory Assistance: Independent (Community ambulator with no AD)  Precautions:  Precautions  Medical Precautions: Fall precautions       Objective   Pain:     Cognition:  Cognition  Overall Cognitive Status: Within Functional Limits  Orientation Level: Oriented X4    General Assessments:    Activity Tolerance  Endurance: Endurance does not limit participation in activity    Sensation  Light Touch: Partial deficits in the RLE, Partial deficits in the LLE (Pt reports currently \"numb\" sensation restricted to B feet, although able to detect light touch)    Strength  Strength Comments: 5/5 x4  Strength  Strength Comments: 5/5 x4    Coordination  Movements are Fluid and Coordinated: Yes     Static Sitting Balance  Static Sitting-Level of Assistance: Independent  Dynamic Sitting Balance  Dynamic Sitting-Level of Assistance: Independent    Static Standing Balance  Static Standing-Level of Assistance: Independent  Dynamic Standing Balance  Dynamic Standing-Level of Assistance: Distant supervision  Functional Assessments:     Bed Mobility  Bed Mobility: Yes  Bed Mobility 1  Bed " Mobility 1: Supine to sitting, Sitting to supine  Level of Assistance 1: Independent    Transfers  Transfer: Yes  Transfer 1  Transfer From 1: Sit to, Stand to  Transfer to 1: Sit  Transfer Level of Assistance 1: Independent    Ambulation/Gait Training  Ambulation/Gait Training Performed: Yes  Ambulation/Gait Training 1  Surface 1: Level tile, Carpet  Device 1: No device  Assistance 1: Distant supervision  Quality of Gait 1: Wide base of support (Mildly increased lateral sway and path deviation.  Pt reporting needing to focus more than usual, 2/2 sensory deficits)  Comments/Distance (ft) 1: 25 feet (distance limited by arrival of transport)    Stairs  Stairs: No (session limited by arrival of transport)    Extremity/Trunk Assessments:    RUE   RUE : Within Functional Limits  LUE   LUE: Within Functional Limits  RLE   RLE : Within Functional Limits (aside from sensory deficit)  LLE   LLE : Within Functional Limits (aside from sensory deficit)  Outcome Measures:    [facility] Basic Mobility  Turning from your back to your side while in a flat bed without using bedrails: None  Moving from lying on your back to sitting on the side of a flat bed without using bedrails: None  Moving to and from bed to chair (including a wheelchair): None  Standing up from a chair using your arms (e.g. wheelchair or bedside chair): None  To walk in hospital room: A little  Climbing 3-5 steps with railing: A little  Basic Mobility - Total Score: 22    Encounter Problems       Encounter Problems (Active)       Balance       Tinetti>24 to reflect improvement in balance and decreased falls risk  (Progressing)       Start:  02/28/25    Expected End:  03/14/25            FGA>26 to reflect improvement in balance and decreased falls risk  (Progressing)       Start:  02/28/25    Expected End:  03/14/25               Mobility       Ambulate >500 feet, no device, Ind  (Progressing)       Start:  02/28/25    Expected End:  03/14/25            Up/dn 12  stairs, Ind (Progressing)       Start:  02/28/25    Expected End:  03/14/25               Pain - Adult              Education Documentation  Mobility Training, taught by Candido Mata, PT at 2/28/2025  5:59 PM.  Learner: Patient  Readiness: Acceptance  Method: Explanation  Response: Verbalizes Understanding  Comment: Role of PT, POC, progression of mobility    Education Comments  No comments found.

## 2025-02-28 NOTE — PROGRESS NOTES
"Rafael Singh is a 54 y.o. male on day 1 of admission presenting with Edema of spinal cord.    Subjective   NAEON, still feels heaviness of BLE  no new weakness/numbness  Bowel movement, appetite as baseline  No difficulty in urination       Objective     Last Recorded Vitals  Blood pressure 134/84, pulse 58, temperature 36.2 °C (97.2 °F), resp. rate 18, height 1.753 m (5' 9\"), weight 83.9 kg (184 lb 15.5 oz), SpO2 98%.  Intake/Output last 3 Shifts:  No intake/output data recorded.    Relevant Results          MENTAL STATUS:  General appearance: No acute distress  Orientation: A&Ox4  Language: Expression, comprehension intact     CRANIAL NERVES:  - II/III: PERRL  - II:    Visual acuity R20/20, L20/20, no color desaturation or painful eyemovement  Visual fields intact to confrontation bilaterally tested individually and together  - III, IV, VI: EOM full to pursuit without nystagmus  - V: V1-V3 sensation intact bilaterally  - VII: Face muscles symmetric with smile and eye closure  - VIII: Intact to interview  - IX, X: Palate elevated symmetrically bilaterally, no hoarseness  - XI: 5/5 strength on shoulder shrugging bilaterally  - XII: Tongue midline without atrophy or fasciculation     MOTOR: Tone and bulk normal in all extremities. No tremor, no abnormal movements.     STRENGTH:      R          L  Deltoid             5          5  Biceps              5          5  Triceps             5          5                    5          5  Hip abduction  5          5  Hip adduction  5          5  Hip flexion        5          5  Quadriceps       5          5  Hamstrings       5           5  DorsiFlex           5           5  PlantarFlex        5            5     REFLEXES: Brisker over lower extremities                            R             L  Biceps              +2        +2  Triceps             +2        +2  Brachioradialis  +2        +2  Patellar             +3        +3  Achilles              3           3     No " Noted.   I would certainly be happy to write a letter.  Letter completed. Can put in my box for signature tomorrow and ask how dad prefers to pick letter. Thanks    Babinski signs  No clonus     COORDINATION:   Intact on finger to nose bl, intact on heel to shin bl  Romberg +   Gait wide based, can't walk on heels due to imbalance    SENSORY:   - No sensory level with pinprick in the front or vibration in the back  - Possible impaired proprioception of bilateral toes (inconsistently miss 2/5 in bilateral feet)  - Intact to light touch, pin prick, vibration in bl UE and LE.       Results for orders placed or performed during the hospital encounter of 02/27/25 (from the past 24 hours)   CBC and Auto Differential   Result Value Ref Range    WBC 11.3 4.4 - 11.3 x10*3/uL    nRBC 0.0 0.0 - 0.0 /100 WBCs    RBC 5.35 4.50 - 5.90 x10*6/uL    Hemoglobin 15.4 13.5 - 17.5 g/dL    Hematocrit 43.6 41.0 - 52.0 %    MCV 82 80 - 100 fL    MCH 28.8 26.0 - 34.0 pg    MCHC 35.3 32.0 - 36.0 g/dL    RDW 12.8 11.5 - 14.5 %    Platelets 403 150 - 450 x10*3/uL    Neutrophils % 66.8 40.0 - 80.0 %    Immature Granulocytes %, Automated 1.4 (H) 0.0 - 0.9 %    Lymphocytes % 24.6 13.0 - 44.0 %    Monocytes % 5.9 2.0 - 10.0 %    Eosinophils % 1.1 0.0 - 6.0 %    Basophils % 0.2 0.0 - 2.0 %    Neutrophils Absolute 7.52 1.20 - 7.70 x10*3/uL    Immature Granulocytes Absolute, Automated 0.16 0.00 - 0.70 x10*3/uL    Lymphocytes Absolute 2.77 1.20 - 4.80 x10*3/uL    Monocytes Absolute 0.66 0.10 - 1.00 x10*3/uL    Eosinophils Absolute 0.12 0.00 - 0.70 x10*3/uL    Basophils Absolute 0.02 0.00 - 0.10 x10*3/uL   Comprehensive metabolic panel   Result Value Ref Range    Glucose 90 74 - 99 mg/dL    Sodium 139 136 - 145 mmol/L    Potassium 3.7 3.5 - 5.3 mmol/L    Chloride 100 98 - 107 mmol/L    Bicarbonate 29 21 - 32 mmol/L    Anion Gap 14 10 - 20 mmol/L    Urea Nitrogen 15 6 - 23 mg/dL    Creatinine 0.85 0.50 - 1.30 mg/dL    eGFR >90 >60 mL/min/1.73m*2    Calcium 8.9 8.6 - 10.6 mg/dL    Albumin 4.5 3.4 - 5.0 g/dL    Alkaline Phosphatase 63 33 - 120 U/L    Total Protein 7.1 6.4 - 8.2 g/dL    AST 20 9 - 39 U/L    Bilirubin,  Total 0.4 0.0 - 1.2 mg/dL    ALT 48 10 - 52 U/L   BLOOD GAS VENOUS FULL PANEL   Result Value Ref Range    POCT pH, Venous 7.38 7.33 - 7.43 pH    POCT pCO2, Venous 55 (H) 41 - 51 mm Hg    POCT pO2, Venous 24 (L) 35 - 45 mm Hg    POCT SO2, Venous 25 (L) 45 - 75 %    POCT Oxy Hemoglobin, Venous 24.5 (L) 45.0 - 75.0 %    POCT Hematocrit Calculated, Venous 47.0 41.0 - 52.0 %    POCT Sodium, Venous 137 136 - 145 mmol/L    POCT Potassium, Venous 3.9 3.5 - 5.3 mmol/L    POCT Chloride, Venous 102 98 - 107 mmol/L    POCT Ionized Calicum, Venous 1.16 1.10 - 1.33 mmol/L    POCT Glucose, Venous 102 (H) 74 - 99 mg/dL    POCT Lactate, Venous 1.9 0.4 - 2.0 mmol/L    POCT Base Excess, Venous 5.6 (H) -2.0 - 3.0 mmol/L    POCT HCO3 Calculated, Venous 32.5 (H) 22.0 - 26.0 mmol/L    POCT Hemoglobin, Venous 15.5 13.5 - 17.5 g/dL    POCT Anion Gap, Venous 6.0 (L) 10.0 - 25.0 mmol/L    Patient Temperature 37.0 degrees Celsius    FiO2 21 %   Syphilis Screen with Reflex   Result Value Ref Range    Syphilis Total Ab Nonreactive Nonreactive   Coagulation Screen   Result Value Ref Range    Protime 11.8 9.8 - 12.4 seconds    INR 1.1 0.9 - 1.1    aPTT 32 26 - 36 seconds          Assessment/Plan   Assessment & Plan  Edema of spinal cord    This is a 54 y.o. male with PMH of bilateral optic neuritis in 2020, presenting with presenting with abnormal spinal cord signal found on 2/22.     Past history is remarkable for prior bilateral (R>L) optic neuritis which the patient was seen at the [facility] for once. Patient has not followed up since.      His neuro exam was remarkable for positive romberg and wide based gait, likely sensory ataxia. His vision was 20/20 bilaterally. There was no focal weakness or sensory level. Urinary and bowel movement was normal and continent. There was not saddle anesthesia.    Workup to date include: CT chest showed remote granulomatous disease, Thoracic MRI w/wo contrast showed a small enhancing lesion around T11-12  with edema.     Considering his history of bilateral optic neuritis and current non longitudinal extensive T11-12 enhancing lesion, differential diagnoses include NMO/MOGAD (or relatively unlikely MS) v.s. sarcoidosis v.s. malignancy. Further workup includes brain and C spine MRI w/wo contrast, PET, and LP is warranted.    Update on 2/28  - Brain and C spine MRI today  - PET scan ordered   - consult PT/OT   - consult pulm for CT chest granuloma  - Plan for LP  - Continue with prednisolone 60 mg     #Spinal cord lesion and past marcin optic neuritis, c/f NMO/MOGAD v.s. sarcoid v.s. malignancy  :: CT chest 2/22/25: remote granulomatous disease  :: T+L spine MRI: small enhancing lesion at central-dorsal midline of the spinal cord with intramedullary cord edema   :: Romberg+, sensory ataxia, no focal weakness  - Follow pulm recs for granulomatis findings on CT chest   - PT/OT eval  - MRI brain and c spine w and wo IV contrast  - LP (Coags WNL, plt 403k), send flow cytometry, cytology, OCB, IgG index, Paraneoplastic panel  -Predni 60mg, 2/28-, monitor BG with SSI, on PPI  VitD 5000IU daily  [ ] serum demyelinating panel         F: prn  E: prn  N: Regular  A: PIV     GI: PPI  DVT PPX: SCDs      CODE STATUS: FULL CODE (confirmed on admission)  NOK: Rosa Isela Singh (spouse) [phone]           JUAN RAMON GLENDY      I personally saw, examined, and discussed the patient above. I have reviewed and agree with the medical student note above. All edits or corrections have been made directly into the note, including the physical exam and assessment/plan. Patient was seen and discussed with the attending, Dr. Gabriel, who agrees with the management plan.     Mao Horton MD  PGY-1, Neurology

## 2025-02-28 NOTE — PROGRESS NOTES
Pharmacy Admission Order Reconciliation Review    Rafael Singh is a 54 y.o. male admitted for Edema of spinal cord. Pharmacy reviewed the patient's unreconciled admission medications.    Prior to admission medications that were reviewed and acted on by the pharmacist include:  Vitamin C  Fexofenadine   Omeprazole OTC  These medications have been reconciled.     Any other unreconcilied medications have been addressed and will be ordered or held by the patient's medical team. Medications addressed by the pharmacist may be added or changed by the patient's medical team at any time.    Nathaniel Duran, Sandy  [facility]  [address]  [facility], Room# 0249  [place] [id number]  Phone: [phone]  Please reach out via Secure Chat for questions, or if no response call "[employer] or [employer] [facility]

## 2025-02-28 NOTE — NURSING NOTE
2239 Received report from Nurse Borrego.   2311 patient arrived to unit from ED. Patient is ambulatory from [facility]  to bed. Educated patient on room environment and call light. Bed locked in lowest position and call light within reach. Plan of care ongoing.

## 2025-02-28 NOTE — CARE PLAN
Problem: Fall/Injury  Goal: Not fall by end of shift  Outcome: Progressing  Goal: Be free from injury by end of the shift  Outcome: Progressing  Goal: Verbalize understanding of personal risk factors for fall in the hospital  Outcome: Progressing  Goal: Verbalize understanding of risk factor reduction measures to prevent injury from fall in the home  Outcome: Progressing  Goal: Use assistive devices by end of the shift  Outcome: Progressing  Goal: Pace activities to prevent fatigue by end of the shift  Outcome: Progressing   The patient's goals for the shift include      The clinical goals for the shift include pt safety will be maintained in duration of the shift    Over the shift, the patient did make progress toward the following goals.

## 2025-02-28 NOTE — ED PROVIDER NOTES
History of Present Illness     Chief Complaint   Patient presents with    Weakness, Gen       History provided by: Patient and Family Member  Limitations to History: None    HPI:  Rafael Singh is a 54 y.o. male presenting for evaluation of his lower extremity numbness that he has had since last Wednesday which is approximately 7 days.  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  He was seen and evaluated and there was concern for transverse myelitis that was treated with steroids from Friday through Monday, he was not discharged on p.o. steroids.  He then had full MRI at that did show evidence of spinal cord edema around T11-12.  He has not had any bowel incontinence, does feel that he has incomplete bladder emptying when urinating.  He denies any other symptoms including chest pain, dyspnea, abdominal pain, nausea, vomiting.     PMH: Per chart: schizophrenia/bipolar disorder, DM/HTN (patient denies these medical conditions)  Meds: Prilosec, allegra, trulicity (has been on for over a year for weight loss)  PSH: No major surgeries  Allg: Amoxicillin-stomach upset      Physical Exam   Triage vitals:  T 36.6 °C (97.9 °F)  HR 76  /88  RR 16  O2 99 % None (Room air)    Constitutional: Awake, alert, and answering questions appropriately. Non toxic appearing.   Head: Atraumatic  Eyes: Pupils equally round and reactive to light, sclera normal, extraocular movements intact  Neck: Full range of motion  Cardiovascular: Regular rate and rhythm without murmurs, Pulses equal throughout, no lower extremity edema  Respiratory: Breathing comfortably, Lungs clear to auscultation throughout, no focal wheeze, crackles, or rales  Abdomen: No overlying skin changes, no tenderness to palpation, no rebound or guarding  Skin: No rashes  Spine: No tenderness to palpation of C,  T, L-spine, no palpable step-offs, no overlying skin changes  Neuro: Extraocular movements in tact, normal speech, no facial asymmetry, no facial numbness, shoulder shrug equal bilaterally, sticks tongue out midline, upper extremity strength normal bilaterally, lower extremity strength equal bilaterally, no dysmetria bilaterally with finger to nose, upper extremity sensation normal bilaterally, lower extremity sensation normal bilaterally, gait normal, romberg negative   Psych: Appropriate mood      ED Course     ED Course as of 25   u  CBC and Auto Differential(!) [KV]    Comprehensive metabolic panel [KV]    BLOOD GAS VENOUS FULL PANEL(!) [KV]      ED Course User Index  [KV] Lani Henderson MD         Diagnoses as of 25   Edema of spinal cord       Procedures   Procedures    Medical Decision Making   Medical Decision Makin y.o. male with continued feelings of sensory changes and findings on outpatient MRI concerning for transverse myelitis.  He does not have any acute findings and therefore at this point does not require emergent steroid administration.    I reviewed the patient's chart and the MRI read states differential of these could be transverse myelitis secondary to influenza infection earlier this month versus B12 deficiency, syphilis, copper deficiency or subacute cord infarct.  Therefore these labs were placed today, except for B12 which was already done and was normal.     CBC and CMP from today are reviewed and there are no significant findings.    I did consult neurology who will come see the patient and give further recommendations on treatment plan.    Neurology gives recommendations for admission to their service, we clarified that patient will not receive any steroids in the emergency department and the admitting team will order for morning administration.    Patient and wife are updated on the plan for admission and agree with treatment  plan.  ----            Disposition   Patient admitted to neurology.    Lani Henderson MD  Emergency Medicine       Lani Henderson MD  02/27/25 [id number]

## 2025-03-01 LAB
COPPER SERPL-MCNC: 65.4 UG/DL (ref 70–140)
GLUCOSE BLD MANUAL STRIP-MCNC: 117 MG/DL (ref 74–99)
GLUCOSE BLD MANUAL STRIP-MCNC: 156 MG/DL (ref 74–99)
GLUCOSE BLD MANUAL STRIP-MCNC: 157 MG/DL (ref 74–99)

## 2025-03-01 PROCEDURE — 82947 ASSAY GLUCOSE BLOOD QUANT: CPT

## 2025-03-01 PROCEDURE — 2500000004 HC RX 250 GENERAL PHARMACY W/ HCPCS (ALT 636 FOR OP/ED): Mod: SE

## 2025-03-01 PROCEDURE — 2500000001 HC RX 250 WO HCPCS SELF ADMINISTERED DRUGS (ALT 637 FOR MEDICARE OP): Mod: SE

## 2025-03-01 PROCEDURE — 99234 HOSP IP/OBS SM DT SF/LOW 45: CPT

## 2025-03-01 PROCEDURE — 1100000001 HC PRIVATE ROOM DAILY

## 2025-03-01 RX ADMIN — PANTOPRAZOLE SODIUM 40 MG: 40 TABLET, DELAYED RELEASE ORAL at 08:16

## 2025-03-01 RX ADMIN — PREDNISONE 60 MG: 20 TABLET ORAL at 08:16

## 2025-03-01 RX ADMIN — Medication 5000 UNITS: at 08:16

## 2025-03-01 ASSESSMENT — COGNITIVE AND FUNCTIONAL STATUS - GENERAL
DRESSING REGULAR LOWER BODY CLOTHING: TOTAL
MOBILITY SCORE: 24
DAILY ACTIVITIY SCORE: 24
DAILY ACTIVITIY SCORE: 21

## 2025-03-01 ASSESSMENT — PAIN SCALES - GENERAL
PAINLEVEL_OUTOF10: 0 - NO PAIN

## 2025-03-01 ASSESSMENT — PAIN - FUNCTIONAL ASSESSMENT
PAIN_FUNCTIONAL_ASSESSMENT: 0-10

## 2025-03-01 ASSESSMENT — PAIN SCALES - WONG BAKER: WONGBAKER_NUMERICALRESPONSE: NO HURT

## 2025-03-01 NOTE — PROGRESS NOTES
Occupational Therapy                      OT Screen    Patient Name: Rafael Singh  MRN: [id number]  Department: [facility]  Room: 4027/4027-A  Today's Date: 3/1/2025     Discipline: Occupational Therapy      (OT Screen); pt ambulating IND and performing all self care IND while in the hospital, spoke with patient who agrees he does not need occupational therapy and thanked therapist for stopping by. Will discharge OT orders at this time.

## 2025-03-01 NOTE — HOSPITAL COURSE
This is a 54 y.o. male with PMH of bilateral optic neuritis in 2020, presenting with presenting with abnormal spinal cord signal found on 2/22, c/f NMO/MOGAD (or relatively unlikely MS) v.s. sarcoidosis v.s. malignancy.      After he was admitted, Brain and C spine MRI w/wo contrast showed non enhancing supratentorial white matter FLAIR. Spinal cord was only remarkable for a small enhancing lesion around T11-12 with edema. Otherwise C and L spine didn't show hyperintensities.     His neuro exam was remarkable for positive romberg and wide based gait, likely sensory ataxia. His vision was 20/20 bilaterally. There was no focal weakness or sensory level. Urinary and bowel movement was normal and continent. There was no saddle anesthesia.     Pulmonology was consulted for granuloma seen in chest CT. Bronchoscopy with EBUS was scheduled on Monday per pulm recs suspect sarcoid. PET and LP was also scheduled and LP send for flow cytometry, cytology, OCB, IgG index, Paraneoplastic panel to r/o malignancy or inflammatory processes.

## 2025-03-01 NOTE — CONSULTS
"Reason For Consult  Bronchoscopy with EBUS for LN biopsy     History Of Present Illness  Rafael Singh is a 54 y.o. male presenting with no significant PMH who presented with sensory symptoms of his limbs and was found to abnormal spinal cord signal.  On MRI concerning for transverse myelitis, Pulmonary team consulted to evaluate his calcified mediastinal lymph nodes seen on CT chest - ?sarcoidosis.    He reports no respiratory symptoms or previous pulmonary disease, stated that he was told about calcified LN in his chest long time ago with no change in them.     Past Medical History  He has a past medical history of Acute bronchitis, unspecified (06/15/2017), Other conditions influencing health status, and Prostatitis (06/19/2023).    Surgical History  He has no past surgical history on file.     Social History  He reports that he has quit smoking. His smoking use included cigarettes. He has quit using smokeless tobacco. He reports current alcohol use. He reports that he does not use drugs.    Family History  Family History   Adopted: Yes   Problem Relation Name Age of Onset    No Known Problems Mother      No Known Problems Father          Allergies  Amoxicillin and Pollen extracts    Physical Exam    GEN:  Alert and conversant; no acute distress  HEENT:  NCAT; moist mucus membranes  EYES:  Sclera anicteric, EOMI  PULM:  Symmetric chest rise, no increased work of breathing on room air, no respiratory distress  ABD:  Soft, non-tender, non-distended  MSK:  Moving all extremities spontaneously and to command  SKIN:  Warm, dry, well-perfused  PSYCH:  Appropriate mood and affect     Last Recorded Vitals  Blood pressure 121/80, pulse 95, temperature 36.2 °C (97.1 °F), temperature source Temporal, resp. rate 18, height 1.753 m (5' 9\"), weight 83.9 kg (184 lb 15.5 oz), SpO2 97%.      Assessment/Plan   Rafael Singh is a 54 y.o. male presenting with no significant PMH who presented with sensory symptoms of his limbs and " was found to abnormal spinal cord signal.  On MRI concerning for transverse myelitis, Pulmonary team consulted to evaluate his calcified mediastinal lymph nodes seen on CT chest.    #Mediastinal calcified LN  #Transverse myelitis    -The pattern of his unilateral cacified pulmonary nodule and hailer LN is suggestive of previous infection histoplasmosis. However, sarcoidosis can not be ruled out based on CT images only.    -will plan for bronchoscopy with EBUS on Monday as add-on procedure.  -Please keep him NPO from [facility] on Sunday.    Pulmonary team will continue to follow     Chidi Zurita MD    =====  I saw and evaluated the patient. I personally obtained the key and critical portions of the history and physical exam or was physically present for key and critical portions performed by the resident/fellow. I reviewed the resident/fellow's documentation and discussed the patient with the resident/fellow. I agree with the resident/fellow's medical decision making as documented in the note.    Craig Pierson MD

## 2025-03-01 NOTE — CARE PLAN
The clinical goals for the shift include safety, comfort    Patient oriented x4, denies pain, vitals WNL at baseline. Continues to endorse BLE numbness/tingling, reports it is still the same. Neuro exam otherwise stable. Awaiting further diagnostic tests to be completed Monday. Plan of care discussed with patient, agreeable and verbalizes understanding.       Problem: Fall/Injury  Goal: Not fall by end of shift  Outcome: Progressing  Goal: Be free from injury by end of the shift  Outcome: Progressing  Goal: Verbalize understanding of personal risk factors for fall in the hospital  Outcome: Progressing     Problem: Pain - Adult  Goal: Verbalizes/displays adequate comfort level or baseline comfort level  Outcome: Progressing     Problem: Safety - Adult  Goal: Free from fall injury  Outcome: Progressing     Problem: Discharge Planning  Goal: Discharge to home or other facility with appropriate resources  Outcome: Progressing     Problem: Chronic Conditions and Co-morbidities  Goal: Patient's chronic conditions and co-morbidity symptoms are monitored and maintained or improved  Outcome: Progressing

## 2025-03-01 NOTE — PROGRESS NOTES
"Rafael Singh is a 54 y.o. male on day 2 of admission presenting with Edema of spinal cord.    Subjective   NAEON, still feels heaviness of BLE  no new weakness/numbness  Bowel movement, appetite as baseline  No difficulty in urination       Objective     Last Recorded Vitals  Blood pressure 110/73, pulse 69, temperature 36.1 °C (97 °F), resp. rate 16, height 1.753 m (5' 9\"), weight 83.9 kg (184 lb 15.5 oz), SpO2 97%.  Intake/Output last 3 Shifts:  No intake/output data recorded.    Relevant Results        MENTAL STATUS:  General appearance: No acute distress  Orientation: A&Ox4  Language: Expression, comprehension intact     CRANIAL NERVES:  - II/III: PERRL  - II:   .. Visual fields intact to confrontation bilaterally tested individually and together  .. Per 2/28 exam Visual acuity R20/20, L20/20, no color desaturation or painful eyemovement  - III, IV, VI: EOM full to pursuit without nystagmus  - V: V1-V3 sensation intact bilaterally  - VII: Face muscles symmetric with smile and eye closure  - VIII: Intact to interview  - IX, X: Palate elevated symmetrically bilaterally, no hoarseness  - XI: 5/5 strength on shoulder shrugging bilaterally  - XII: Tongue midline without atrophy or fasciculation     MOTOR: Tone and bulk normal in all extremities. No tremor, no abnormal movements.     STRENGTH:      R          L  Deltoid             5          5  Biceps              5          5  Triceps             5          5                    5          5  Hip abduction  5          5  Hip adduction  5          5  Hip flexion        5          5  Quadriceps       5          5  Hamstrings       5           5  DorsiFlex           5           5  PlantarFlex        5            5     REFLEXES: Brisker over lower extremities                            R             L  Biceps              +2        +2  Triceps             +2        +2  Brachioradialis  +2        +2  Patellar             +3        +3  Achilles              3         "   3     No Babinski signs  No clonus     COORDINATION:   Intact on finger to nose bl, intact on heel to shin Romberg +   Gait wide based, can't walk on heels due to imbalance    SENSORY:   - No sensory level with pinprick in the front or vibration in the back  - Impaired proprioception of bilateral toes (inconsistently miss 2/5 in bilateral feet)  - Intact to light touch, pin prick, vibration in bl UE and LE.       Results for orders placed or performed during the hospital encounter of 02/27/25 (from the past 24 hours)   Coagulation Screen   Result Value Ref Range    Protime 11.8 9.8 - 12.4 seconds    INR 1.1 0.9 - 1.1    aPTT 32 26 - 36 seconds      Workup to date include:   - Brain MRI showed non enhancing supratentorial white matter FLAIR.   - C+T+L MRI w/wo contrast showed a small enhancing lesion around T11-12 with edema. No demyelinating lesion at C and L spine.   - CT chest showed remote granulomatous disease.    Assessment/Plan   Assessment & Plan  Edema of spinal cord    This is a 54 y.o. male with PMH of bilateral optic neuritis in 2020, presenting with presenting with abnormal spinal cord signal found on 2/22, c/f NMO/MOGAD (or relatively unlikely MS) v.s. sarcoidosis v.s. malignancy.     His neuro exam was remarkable for positive romberg and wide based gait, likely sensory ataxia. His vision was 20/20 bilaterally. There was no focal weakness or sensory level. Urinary and bowel movement was normal and continent. There was not saddle anesthesia.    Workup to date include: Brain MRI showed non enhancing supratentorial white matter FLAIR. C+T+L MRI w/wo contrast showed a small enhancing lesion around T11-12 with edema. No demyelinating lesion at C and L spine. CT chest showed remote granulomatous disease.     Considering his history of bilateral optic neuritis and current non longitudinal extensive T11-12 enhancing lesion, and chest granuloma, differential diagnoses include NMO/MOGAD (or relatively unlikely  MS) v.s. sarcoidosis v.s. malignancy.     Pulm was consulted and suggested EBUS on Monday. PET and LP was scheduled on Monday.    Update on 3/1  - Brain and C spine MRI: Non enhancing supratentorial white matter FLAIR hyperintense over inferior corpus callosum. No demyelination of C spine  - Pulm recs bronchoscopy with EBUS for chest granuloma to r/o sarcoid  - PET, EBUS, LP on Monday. (NPO @ [facility] 3/2)   - Continue with prednisolone 60 mg (2/28-)     #Spinal cord lesion and past marcin optic neuritis, c/f NMO/MOGAD v.s. sarcoid v.s. malignancy  :: Romberg+, sensory ataxia, no focal weakness  :: CT chest 2/22/25: remote granulomatous disease   :: Brain MRI: Non enhancing supratentorial white matter FLAIR hyperintense over inferior corpus callosum  :: C+T+L spine MRI: small enhancing lesion at central-dorsal midline of the spinal cord with intramedullary cord edema. No hyperintensities over C+L spine     - PET, EBUS (pulm recs), LP on Monday. (NPO @ [facility] 3/2)   >> LP: Coags and plt WNL; send flow cytometry, cytology, OCB, IgG index, paraneoplastic panel  - Pulm recs: suspect histoplasmosis, recs EBUS to r/o sarcoid, continue follow  - PT/OT recs low intensity outpatient PT  - Meds  >> Predni 60mg, 2/28-, monitor BG with SSI, on PPI  >> VitD 5000IU daily  [ ] serum demyelinating panel, copper        F: prn  E: prn  N: Regular  A: PIV     GI: PPI  DVT PPX: SCDs      CODE STATUS: FULL CODE (confirmed on admission)  NOK: Rosa Isela Singh (spouse) [phone]           JUAN RAMON PIKE  Sub-Intern     I personally saw, examined, and discussed the patient above. I have reviewed and agree with the medical student note above. All edits or corrections have been made directly into the note, including the physical exam and assessment/plan. Patient was seen and discussed with the attending, Dr. Velasco, who agrees with the management plan.     Mao Horton MD  PGY-1, Neurology

## 2025-03-01 NOTE — CARE PLAN
The patient's goals for the shift include  getting adequate rest to promote healing and pain management.    The clinical goals for the shift include pt will remain safe and free from injury throughout  the shift

## 2025-03-02 VITALS
TEMPERATURE: 97.9 F | WEIGHT: 184.97 LBS | BODY MASS INDEX: 27.4 KG/M2 | SYSTOLIC BLOOD PRESSURE: 144 MMHG | OXYGEN SATURATION: 99 % | DIASTOLIC BLOOD PRESSURE: 83 MMHG | RESPIRATION RATE: 18 BRPM | HEIGHT: 69 IN | HEART RATE: 72 BPM

## 2025-03-02 LAB
ALBUMIN SERPL BCP-MCNC: 3.9 G/DL (ref 3.4–5)
ANION GAP SERPL CALC-SCNC: 10 MMOL/L (ref 10–20)
BASOPHILS # BLD AUTO: 0.03 X10*3/UL (ref 0–0.1)
BASOPHILS NFR BLD AUTO: 0.3 %
BUN SERPL-MCNC: 17 MG/DL (ref 6–23)
CALCIUM SERPL-MCNC: 8.7 MG/DL (ref 8.6–10.6)
CHLORIDE SERPL-SCNC: 103 MMOL/L (ref 98–107)
CO2 SERPL-SCNC: 31 MMOL/L (ref 21–32)
CREAT SERPL-MCNC: 0.92 MG/DL (ref 0.5–1.3)
EGFRCR SERPLBLD CKD-EPI 2021: >90 ML/MIN/1.73M*2
EOSINOPHIL # BLD AUTO: 0.06 X10*3/UL (ref 0–0.7)
EOSINOPHIL NFR BLD AUTO: 0.5 %
ERYTHROCYTE [DISTWIDTH] IN BLOOD BY AUTOMATED COUNT: 13.2 % (ref 11.5–14.5)
GLUCOSE BLD MANUAL STRIP-MCNC: 100 MG/DL (ref 74–99)
GLUCOSE BLD MANUAL STRIP-MCNC: 121 MG/DL (ref 74–99)
GLUCOSE BLD MANUAL STRIP-MCNC: 134 MG/DL (ref 74–99)
GLUCOSE SERPL-MCNC: 69 MG/DL (ref 74–99)
HCT VFR BLD AUTO: 41.3 % (ref 41–52)
HGB BLD-MCNC: 13.8 G/DL (ref 13.5–17.5)
IMM GRANULOCYTES # BLD AUTO: 0.19 X10*3/UL (ref 0–0.7)
IMM GRANULOCYTES NFR BLD AUTO: 1.6 % (ref 0–0.9)
LYMPHOCYTES # BLD AUTO: 3.28 X10*3/UL (ref 1.2–4.8)
LYMPHOCYTES NFR BLD AUTO: 27.5 %
MAGNESIUM SERPL-MCNC: 2.29 MG/DL (ref 1.6–2.4)
MCH RBC QN AUTO: 28.4 PG (ref 26–34)
MCHC RBC AUTO-ENTMCNC: 33.4 G/DL (ref 32–36)
MCV RBC AUTO: 85 FL (ref 80–100)
MONOCYTES # BLD AUTO: 0.96 X10*3/UL (ref 0.1–1)
MONOCYTES NFR BLD AUTO: 8 %
NEUTROPHILS # BLD AUTO: 7.42 X10*3/UL (ref 1.2–7.7)
NEUTROPHILS NFR BLD AUTO: 62.1 %
NRBC BLD-RTO: 0 /100 WBCS (ref 0–0)
PHOSPHATE SERPL-MCNC: 3.2 MG/DL (ref 2.5–4.9)
PLATELET # BLD AUTO: 323 X10*3/UL (ref 150–450)
POTASSIUM SERPL-SCNC: 3.1 MMOL/L (ref 3.5–5.3)
RBC # BLD AUTO: 4.86 X10*6/UL (ref 4.5–5.9)
SODIUM SERPL-SCNC: 141 MMOL/L (ref 136–145)
WBC # BLD AUTO: 11.9 X10*3/UL (ref 4.4–11.3)

## 2025-03-02 PROCEDURE — 83735 ASSAY OF MAGNESIUM: CPT

## 2025-03-02 PROCEDURE — 36415 COLL VENOUS BLD VENIPUNCTURE: CPT

## 2025-03-02 PROCEDURE — 2500000001 HC RX 250 WO HCPCS SELF ADMINISTERED DRUGS (ALT 637 FOR MEDICARE OP): Mod: SE

## 2025-03-02 PROCEDURE — 1100000001 HC PRIVATE ROOM DAILY

## 2025-03-02 PROCEDURE — 2500000004 HC RX 250 GENERAL PHARMACY W/ HCPCS (ALT 636 FOR OP/ED): Mod: SE

## 2025-03-02 PROCEDURE — 85025 COMPLETE CBC W/AUTO DIFF WBC: CPT

## 2025-03-02 PROCEDURE — 82947 ASSAY GLUCOSE BLOOD QUANT: CPT

## 2025-03-02 PROCEDURE — 80069 RENAL FUNCTION PANEL: CPT

## 2025-03-02 PROCEDURE — 2500000002 HC RX 250 W HCPCS SELF ADMINISTERED DRUGS (ALT 637 FOR MEDICARE OP, ALT 636 FOR OP/ED): Mod: SE

## 2025-03-02 PROCEDURE — 99234 HOSP IP/OBS SM DT SF/LOW 45: CPT

## 2025-03-02 PROCEDURE — 99232 SBSQ HOSP IP/OBS MODERATE 35: CPT | Performed by: INTERNAL MEDICINE

## 2025-03-02 RX ORDER — POTASSIUM CHLORIDE 20 MEQ/1
80 TABLET, EXTENDED RELEASE ORAL ONCE
Status: DISCONTINUED | OUTPATIENT
Start: 2025-03-02 | End: 2025-03-02 | Stop reason: DRUGHIGH

## 2025-03-02 RX ORDER — POTASSIUM CHLORIDE 20 MEQ/1
40 TABLET, EXTENDED RELEASE ORAL
Status: COMPLETED | OUTPATIENT
Start: 2025-03-02 | End: 2025-03-02

## 2025-03-02 RX ORDER — CUPRIC CHLORIDE 0.4 MG/ML
INJECTION, SOLUTION INTRAVENOUS
Status: CANCELLED | OUTPATIENT
Start: 2025-03-02

## 2025-03-02 RX ADMIN — Medication 5000 UNITS: at 08:16

## 2025-03-02 RX ADMIN — PANTOPRAZOLE SODIUM 40 MG: 40 TABLET, DELAYED RELEASE ORAL at 06:39

## 2025-03-02 RX ADMIN — POTASSIUM CHLORIDE 40 MEQ: 1500 TABLET, EXTENDED RELEASE ORAL at 12:45

## 2025-03-02 RX ADMIN — PREDNISONE 60 MG: 20 TABLET ORAL at 08:16

## 2025-03-02 RX ADMIN — POTASSIUM CHLORIDE 40 MEQ: 1500 TABLET, EXTENDED RELEASE ORAL at 15:37

## 2025-03-02 ASSESSMENT — COGNITIVE AND FUNCTIONAL STATUS - GENERAL
WALKING IN HOSPITAL ROOM: A LITTLE
MOBILITY SCORE: 22
MOBILITY SCORE: 24
DAILY ACTIVITIY SCORE: 24
CLIMB 3 TO 5 STEPS WITH RAILING: A LITTLE

## 2025-03-02 ASSESSMENT — PAIN SCALES - GENERAL
PAINLEVEL_OUTOF10: 0 - NO PAIN

## 2025-03-02 ASSESSMENT — PAIN - FUNCTIONAL ASSESSMENT
PAIN_FUNCTIONAL_ASSESSMENT: 0-10

## 2025-03-02 NOTE — CARE PLAN
The clinical goals for the shift include safety, comfort    Patient oriented x4, denies pain, vitals WNL at baseline. Continues to endorse numbness/tingling in BLE that has remained the same, otherwise neuro exam stable. Standby assist ambulating in room as tolerated. Anticipated PET scan, lumbar puncture and bronch procedure tomorrow 03/03. Patient educated on NPO status at midnight and schedule for tomorrow, agreeable to plan of care at this time.      Problem: Fall/Injury  Goal: Not fall by end of shift  Outcome: Progressing  Goal: Be free from injury by end of the shift  Outcome: Progressing  Goal: Verbalize understanding of personal risk factors for fall in the hospital  Outcome: Progressing     Problem: Pain - Adult  Goal: Verbalizes/displays adequate comfort level or baseline comfort level  Outcome: Progressing     Problem: Safety - Adult  Goal: Free from fall injury  Outcome: Progressing     Problem: Discharge Planning  Goal: Discharge to home or other facility with appropriate resources  Outcome: Progressing     Problem: Chronic Conditions and Co-morbidities  Goal: Patient's chronic conditions and co-morbidity symptoms are monitored and maintained or improved  Outcome: Progressing

## 2025-03-02 NOTE — CARE PLAN
The patient's goals for the shift include  safety    The clinical goals for the shift include safety    Problem: Fall/Injury  Goal: Not fall by end of shift  Outcome: Progressing  Goal: Be free from injury by end of the shift  Outcome: Progressing  Goal: Verbalize understanding of personal risk factors for fall in the hospital  Outcome: Progressing  Goal: Verbalize understanding of risk factor reduction measures to prevent injury from fall in the home  Outcome: Progressing  Goal: Use assistive devices by end of the shift  Outcome: Progressing  Goal: Pace activities to prevent fatigue by end of the shift  Outcome: Progressing     Problem: Pain - Adult  Goal: Verbalizes/displays adequate comfort level or baseline comfort level  Outcome: Progressing     Problem: Safety - Adult  Goal: Free from fall injury  Outcome: Progressing     Problem: Discharge Planning  Goal: Discharge to home or other facility with appropriate resources  Outcome: Progressing     Problem: Chronic Conditions and Co-morbidities  Goal: Patient's chronic conditions and co-morbidity symptoms are monitored and maintained or improved  Outcome: Progressing     Problem: Nutrition  Goal: Nutrient intake appropriate for maintaining nutritional needs  Outcome: Progressing     Problem: Skin  Goal: Participates in plan/prevention/treatment measures  Outcome: Progressing  Goal: Prevent/manage excess moisture  Outcome: Progressing  Goal: Prevent/minimize sheer/friction injuries  Outcome: Progressing

## 2025-03-02 NOTE — PROGRESS NOTES
"Rafael Singh is a 54 y.o. male on day 3 of admission presenting with Edema of spinal cord.    Subjective   Patient was seen and is doing well today. He still feels heaviness in his legs but reports that yesterday he felt that his balance improved a little bit, allowing him to stand and walk. Patient was advised abut risk of fall.  No acute events overnight.       Objective     Last Recorded Vitals  Blood pressure 122/78, pulse 59, temperature 36.9 °C (98.4 °F), temperature source Temporal, resp. rate 17, height 1.753 m (5' 9\"), weight 83.9 kg (184 lb 15.5 oz), SpO2 100%.  Intake/Output last 3 Shifts:  I/O last 3 completed shifts:  In: 480 (5.7 mL/kg) [P.O.:480]  Out: - (0 mL/kg)   Weight: 83.9 kg     Relevant Results        MENTAL STATUS:  General appearance: No acute distress  Orientation: A&Ox4  Language: Expression, comprehension intact     CRANIAL NERVES:  - II/III: PERRL  - II:   .. Visual fields intact to confrontation bilaterally tested individually and together  .. Per 2/28 exam Visual acuity R20/20, L20/20, no color desaturation or painful eyemovement  - III, IV, VI: EOM full to pursuit without nystagmus  - V: V1-V3 sensation intact bilaterally  - VII: Face muscles symmetric with smile and eye closure  - VIII: Intact to interview  - IX, X: Palate elevated symmetrically bilaterally, no hoarseness  - XI: 5/5 strength on shoulder shrugging bilaterally  - XII: Tongue midline without atrophy or fasciculation     MOTOR: Tone and bulk normal in all extremities. No tremor, no abnormal movements.     STRENGTH:      R          L  Deltoid             5          5  Biceps              5          5  Triceps             5          5                    5          5  Hip abduction  5          5  Hip adduction  5          5  Hip flexion        5          5  Quadriceps       5          5  Hamstrings       5           5  DorsiFlex           5           5  PlantarFlex        5            5     REFLEXES: Brisker over " lower extremities                            R             L  Biceps              +2        +2  Triceps             +2        +2  Brachioradialis  +2        +2  Patellar             +3        +3  Achilles              3           3     No Babinski signs  No clonus     COORDINATION:   Intact on finger to nose bl, intact on heel to shin Romberg +   Gait wide based, can't walk on heels due to imbalance    SENSORY:   - No sensory level with pinprick in the front or vibration in the back  - Impaired proprioception of bilateral toes (inconsistently miss 2/5 in bilateral feet)  - Intact to light touch, pin prick, vibration in bl UE and LE.       Results for orders placed or performed during the hospital encounter of 02/27/25 (from the past 24 hours)   POCT GLUCOSE   Result Value Ref Range    POCT Glucose 156 (H) 74 - 99 mg/dL   POCT GLUCOSE   Result Value Ref Range    POCT Glucose 117 (H) 74 - 99 mg/dL   POCT GLUCOSE   Result Value Ref Range    POCT Glucose 157 (H) 74 - 99 mg/dL   POCT GLUCOSE   Result Value Ref Range    POCT Glucose 100 (H) 74 - 99 mg/dL      Workup to date include:   - Brain MRI showed non enhancing supratentorial white matter FLAIR.   - C+T+L MRI w/wo contrast showed a small enhancing lesion around T11-12 with edema. No demyelinating lesion at C and L spine.   - CT chest showed remote granulomatous disease.    Assessment/Plan   Assessment & Plan  Edema of spinal cord    This is a 54 y.o. male with PMH of bilateral optic neuritis in 2020, presenting with presenting with abnormal spinal cord signal found on 2/22, c/f NMO/MOGAD (or relatively unlikely MS) v.s. sarcoidosis v.s. malignancy.     His neuro exam was remarkable for positive romberg and wide based gait, likely sensory ataxia. His vision was 20/20 bilaterally. There was no focal weakness or sensory level. Urinary and bowel movement was normal and continent. There was not saddle anesthesia.    Workup to date include: Brain MRI showed non enhancing  supratentorial white matter FLAIR. C+T+L MRI w/wo contrast showed a small enhancing lesion around T11-12 with edema. No demyelinating lesion at C and L spine. CT chest showed remote granulomatous disease.     Considering his history of bilateral optic neuritis and current non longitudinal extensive T11-12 enhancing lesion, and chest granuloma, differential diagnoses include NMO/MOGAD (or relatively unlikely MS) v.s. sarcoidosis v.s. malignancy.     Pulm was consulted and suggested EBUS on Monday. PET and LP was scheduled on Monday.    Update on 3/2  -Plan for bronchoscopy with EBUS for granuloma to r/o sarcoid tomorrow. NPO @[facility]  - PET and possible LP on Monday.  - Continue with prednisolone 60 mg (2/28-)             #Spinal cord lesion and past marcin optic neuritis, c/f NMO/MOGAD v.s. sarcoid v.s. malignancy  :: Romberg+, sensory ataxia, no focal weakness  :: CT chest 2/22/25: remote granulomatous disease   :: Brain MRI: Non enhancing supratentorial white matter FLAIR hyperintense over inferior corpus callosum  :: C+T+L spine MRI: small enhancing lesion at central-dorsal midline of the spinal cord with intramedullary cord edema. No hyperintensities over C+L spine   ::  Brain and C spine MRI: Non enhancing supratentorial white matter FLAIR hyperintense over inferior corpus callosum. No demyelination of C spine  :: Copper: 65.4   Plan  - PET, EBUS (pulm recs), LP on Monday. (NPO @ [facility] 3/2)   >> LP: Coags and plt WNL; send flow cytometry, cytology, OCB, IgG index, paraneoplastic panel  - Pulm recs: suspect histoplasmosis, recs EBUS to r/o sarcoid, continue follow  - PT/OT recs low intensity outpatient PT  - Meds  - Predni 60mg, 2/28-, monitor BG with SSI, on PPI  - VitD 5000IU daily  [ ] serum demyelinating panel,        F: prn  E: prn  N: Regular  A: PIV     GI: PPI  DVT PPX: SCDs      CODE STATUS: FULL CODE (confirmed on admission)  NOK: Rosa Isela Singh (spouse) [phone]           Mao Horton MD  PGY-1,  Neurology

## 2025-03-02 NOTE — PROGRESS NOTES
"Rafael Singh is a 54 y.o. male on day 3 of admission presenting with Edema of spinal cord.    Subjective   Remains on RA, ready for the procedure tomorrow       Objective     Physical Exam  GEN:  Alert and conversant; no acute distress  HEENT:  NCAT; moist mucus membranes  EYES:  Sclera anicteric, EOMI  PULM:  Symmetric chest rise, no increased work of breathing on room air, no respiratory distress  ABD:  Soft, non-tender, non-distended  MSK:  Moving all extremities spontaneously and to command  SKIN:  Warm, dry, well-perfused  PSYCH:  Appropriate mood and affect  Last Recorded Vitals  Blood pressure 122/78, pulse 59, temperature 36.9 °C (98.4 °F), temperature source Temporal, resp. rate 17, height 1.753 m (5' 9\"), weight 83.9 kg (184 lb 15.5 oz), SpO2 100%.  Intake/Output last 3 Shifts:  I/O last 3 completed shifts:  In: 480 (5.7 mL/kg) [P.O.:480]  Out: - (0 mL/kg)   Weight: 83.9 kg     Relevant Results                              Assessment/Plan   Assessment & Plan  Edema of spinal cord    kylah Singh is a 54 y.o. male presenting with no significant PMH who presented with sensory symptoms of his limbs and was found to abnormal spinal cord signal.  On MRI concerning for transverse myelitis, Pulmonary team consulted to evaluate his calcified mediastinal lymph nodes seen on CT chest.     #Mediastinal calcified LN  #Transverse myelitis     -The pattern of his unilateral cacified pulmonary nodule and hailer LN is suggestive of previous infection histoplasmosis. However, sarcoidosis can not be ruled out based on CT images only.     -will plan for bronchoscopy with EBUS on Monday 3/3/25 as add-on procedure.  -Please keep him NPO from [facility] on Sunday.     Pulmonary team will continue to follow      Case seen and discussed with Dr. Dangelo Crum MD    ======  I saw and evaluated the patient. I personally obtained the key and critical portions of the history and physical exam or was physically present for key " and critical portions performed by the resident/fellow. I reviewed the resident/fellow's documentation and discussed the patient with the resident/fellow. I agree with the resident/fellow's medical decision making as documented in the note.    Craig Pierson MD

## 2025-03-03 ENCOUNTER — APPOINTMENT (OUTPATIENT)
Dept: PRIMARY CARE | Facility: CLINIC | Age: 55
End: 2025-03-03
Payer: COMMERCIAL

## 2025-03-03 ENCOUNTER — ANESTHESIA (OUTPATIENT)
Dept: GASTROENTEROLOGY | Facility: HOSPITAL | Age: 55
End: 2025-03-03
Payer: COMMERCIAL

## 2025-03-03 ENCOUNTER — ANESTHESIA EVENT (OUTPATIENT)
Dept: GASTROENTEROLOGY | Facility: HOSPITAL | Age: 55
End: 2025-03-03
Payer: COMMERCIAL

## 2025-03-03 ENCOUNTER — APPOINTMENT (OUTPATIENT)
Dept: GASTROENTEROLOGY | Facility: HOSPITAL | Age: 55
End: 2025-03-03
Payer: COMMERCIAL

## 2025-03-03 ENCOUNTER — APPOINTMENT (OUTPATIENT)
Dept: RADIOLOGY | Facility: HOSPITAL | Age: 55
End: 2025-03-03
Payer: COMMERCIAL

## 2025-03-03 PROBLEM — J44.9 CHRONIC OBSTRUCTIVE PULMONARY DISEASE (MULTI): Status: ACTIVE | Noted: 2025-03-03

## 2025-03-03 LAB
ALBUMIN SERPL BCP-MCNC: 3.9 G/DL (ref 3.4–5)
ANION GAP SERPL CALC-SCNC: 12 MMOL/L (ref 10–20)
BASOPHILS # BLD AUTO: 0.03 X10*3/UL (ref 0–0.1)
BASOPHILS NFR BLD AUTO: 0.3 %
BASOPHILS NFR FLD MANUAL: 0 %
BLASTS NFR FLD MANUAL: 0 %
BUN SERPL-MCNC: 15 MG/DL (ref 6–23)
CALCIUM SERPL-MCNC: 8.8 MG/DL (ref 8.6–10.6)
CHLORIDE SERPL-SCNC: 105 MMOL/L (ref 98–107)
CLARITY FLD: CLEAR
CO2 SERPL-SCNC: 28 MMOL/L (ref 21–32)
COLOR FLD: COLORLESS
CREAT SERPL-MCNC: 0.83 MG/DL (ref 0.5–1.3)
EGFRCR SERPLBLD CKD-EPI 2021: >90 ML/MIN/1.73M*2
EOSINOPHIL # BLD AUTO: 0.02 X10*3/UL (ref 0–0.7)
EOSINOPHIL NFR BLD AUTO: 0.2 %
EOSINOPHIL NFR FLD MANUAL: 0 %
ERYTHROCYTE [DISTWIDTH] IN BLOOD BY AUTOMATED COUNT: 13.5 % (ref 11.5–14.5)
GLUCOSE BLD MANUAL STRIP-MCNC: 100 MG/DL (ref 74–99)
GLUCOSE BLD MANUAL STRIP-MCNC: 113 MG/DL (ref 74–99)
GLUCOSE BLD MANUAL STRIP-MCNC: 117 MG/DL (ref 74–99)
GLUCOSE BLD MANUAL STRIP-MCNC: 197 MG/DL (ref 74–99)
GLUCOSE BLD MANUAL STRIP-MCNC: 95 MG/DL (ref 74–99)
GLUCOSE SERPL-MCNC: 86 MG/DL (ref 74–99)
HCT VFR BLD AUTO: 38.8 % (ref 41–52)
HGB BLD-MCNC: 13 G/DL (ref 13.5–17.5)
IMM GRANULOCYTES # BLD AUTO: 0.12 X10*3/UL (ref 0–0.7)
IMM GRANULOCYTES NFR BLD AUTO: 1.3 % (ref 0–0.9)
IMMATURE GRANULOCYTES IN FLUID: 0 %
LYMPHOCYTES # BLD AUTO: 2.85 X10*3/UL (ref 1.2–4.8)
LYMPHOCYTES NFR BLD AUTO: 29.8 %
LYMPHOCYTES NFR FLD MANUAL: 40 %
MAGNESIUM SERPL-MCNC: 2.23 MG/DL (ref 1.6–2.4)
MCH RBC QN AUTO: 28.3 PG (ref 26–34)
MCHC RBC AUTO-ENTMCNC: 33.5 G/DL (ref 32–36)
MCV RBC AUTO: 85 FL (ref 80–100)
MONOCYTES # BLD AUTO: 0.75 X10*3/UL (ref 0.1–1)
MONOCYTES NFR BLD AUTO: 7.9 %
MONOS+MACROS NFR FLD MANUAL: 58 %
NEUTROPHILS # BLD AUTO: 5.78 X10*3/UL (ref 1.2–7.7)
NEUTROPHILS NFR BLD AUTO: 60.5 %
NEUTROPHILS NFR FLD MANUAL: 2 %
NRBC BLD-RTO: 0 /100 WBCS (ref 0–0)
OTHER CELLS NFR FLD MANUAL: 0 %
PHOSPHATE SERPL-MCNC: 3.8 MG/DL (ref 2.5–4.9)
PLASMA CELLS NFR FLD MANUAL: 0 %
PLATELET # BLD AUTO: 286 X10*3/UL (ref 150–450)
POTASSIUM SERPL-SCNC: 3.7 MMOL/L (ref 3.5–5.3)
RBC # BLD AUTO: 4.59 X10*6/UL (ref 4.5–5.9)
RBC # FLD AUTO: 1000 /UL
SODIUM SERPL-SCNC: 141 MMOL/L (ref 136–145)
TOTAL CELLS COUNTED FLD: 100
WBC # BLD AUTO: 9.6 X10*3/UL (ref 4.4–11.3)
WBC # FLD AUTO: 61 /UL

## 2025-03-03 PROCEDURE — 88173 CYTOPATH EVAL FNA REPORT: CPT | Mod: TC,MCY | Performed by: STUDENT IN AN ORGANIZED HEALTH CARE EDUCATION/TRAINING PROGRAM

## 2025-03-03 PROCEDURE — A31653 PR BRNCHSC EBUS GUIDED SAMPL 3/> NODE STATION/STRUX: Performed by: NURSE ANESTHETIST, CERTIFIED REGISTERED

## 2025-03-03 PROCEDURE — 3700000001 HC GENERAL ANESTHESIA TIME - INITIAL BASE CHARGE

## 2025-03-03 PROCEDURE — 87070 CULTURE OTHR SPECIMN AEROBIC: CPT | Performed by: STUDENT IN AN ORGANIZED HEALTH CARE EDUCATION/TRAINING PROGRAM

## 2025-03-03 PROCEDURE — 2500000001 HC RX 250 WO HCPCS SELF ADMINISTERED DRUGS (ALT 637 FOR MEDICARE OP): Mod: SE

## 2025-03-03 PROCEDURE — 7100000009 HC PHASE TWO TIME - INITIAL BASE CHARGE

## 2025-03-03 PROCEDURE — 0B9H8ZX DRAINAGE OF LUNG LINGULA, VIA NATURAL OR ARTIFICIAL OPENING ENDOSCOPIC, DIAGNOSTIC: ICD-10-PCS | Performed by: STUDENT IN AN ORGANIZED HEALTH CARE EDUCATION/TRAINING PROGRAM

## 2025-03-03 PROCEDURE — 78816 PET IMAGE W/CT FULL BODY: CPT | Mod: PI

## 2025-03-03 PROCEDURE — 31625 BRONCHOSCOPY W/BIOPSY(S): CPT | Performed by: STUDENT IN AN ORGANIZED HEALTH CARE EDUCATION/TRAINING PROGRAM

## 2025-03-03 PROCEDURE — 78815 PET IMAGE W/CT SKULL-THIGH: CPT | Mod: PET TUMOR INIT TX STRAT | Performed by: RADIOLOGY

## 2025-03-03 PROCEDURE — 88185 FLOWCYTOMETRY/TC ADD-ON: CPT | Performed by: STUDENT IN AN ORGANIZED HEALTH CARE EDUCATION/TRAINING PROGRAM

## 2025-03-03 PROCEDURE — 83735 ASSAY OF MAGNESIUM: CPT

## 2025-03-03 PROCEDURE — 31624 DX BRONCHOSCOPE/LAVAGE: CPT | Performed by: STUDENT IN AN ORGANIZED HEALTH CARE EDUCATION/TRAINING PROGRAM

## 2025-03-03 PROCEDURE — 2720000007 HC OR 272 NO HCPCS

## 2025-03-03 PROCEDURE — 99232 SBSQ HOSP IP/OBS MODERATE 35: CPT | Performed by: STUDENT IN AN ORGANIZED HEALTH CARE EDUCATION/TRAINING PROGRAM

## 2025-03-03 PROCEDURE — 2500000004 HC RX 250 GENERAL PHARMACY W/ HCPCS (ALT 636 FOR OP/ED): Mod: SE | Performed by: NURSE ANESTHETIST, CERTIFIED REGISTERED

## 2025-03-03 PROCEDURE — A9552 F18 FDG: HCPCS | Mod: SE | Performed by: STUDENT IN AN ORGANIZED HEALTH CARE EDUCATION/TRAINING PROGRAM

## 2025-03-03 PROCEDURE — 7100000001 HC RECOVERY ROOM TIME - INITIAL BASE CHARGE

## 2025-03-03 PROCEDURE — 1100000001 HC PRIVATE ROOM DAILY

## 2025-03-03 PROCEDURE — 7100000002 HC RECOVERY ROOM TIME - EACH INCREMENTAL 1 MINUTE

## 2025-03-03 PROCEDURE — 82947 ASSAY GLUCOSE BLOOD QUANT: CPT

## 2025-03-03 PROCEDURE — 36415 COLL VENOUS BLD VENIPUNCTURE: CPT

## 2025-03-03 PROCEDURE — 31653 BRONCH EBUS SAMPLNG 3/> NODE: CPT | Performed by: STUDENT IN AN ORGANIZED HEALTH CARE EDUCATION/TRAINING PROGRAM

## 2025-03-03 PROCEDURE — 31622 DX BRONCHOSCOPE/WASH: CPT | Performed by: STUDENT IN AN ORGANIZED HEALTH CARE EDUCATION/TRAINING PROGRAM

## 2025-03-03 PROCEDURE — 80069 RENAL FUNCTION PANEL: CPT

## 2025-03-03 PROCEDURE — 3430000001 HC RX 343 DIAGNOSTIC RADIOPHARMACEUTICALS: Mod: SE | Performed by: STUDENT IN AN ORGANIZED HEALTH CARE EDUCATION/TRAINING PROGRAM

## 2025-03-03 PROCEDURE — 3700000002 HC GENERAL ANESTHESIA TIME - EACH INCREMENTAL 1 MINUTE

## 2025-03-03 PROCEDURE — A31653 PR BRNCHSC EBUS GUIDED SAMPL 3/> NODE STATION/STRUX: Performed by: ANESTHESIOLOGY

## 2025-03-03 PROCEDURE — 87102 FUNGUS ISOLATION CULTURE: CPT | Performed by: STUDENT IN AN ORGANIZED HEALTH CARE EDUCATION/TRAINING PROGRAM

## 2025-03-03 PROCEDURE — 85025 COMPLETE CBC W/AUTO DIFF WBC: CPT

## 2025-03-03 PROCEDURE — 89051 BODY FLUID CELL COUNT: CPT | Performed by: STUDENT IN AN ORGANIZED HEALTH CARE EDUCATION/TRAINING PROGRAM

## 2025-03-03 PROCEDURE — 2500000004 HC RX 250 GENERAL PHARMACY W/ HCPCS (ALT 636 FOR OP/ED): Mod: SE

## 2025-03-03 PROCEDURE — 87015 SPECIMEN INFECT AGNT CONCNTJ: CPT | Performed by: STUDENT IN AN ORGANIZED HEALTH CARE EDUCATION/TRAINING PROGRAM

## 2025-03-03 PROCEDURE — 2500000001 HC RX 250 WO HCPCS SELF ADMINISTERED DRUGS (ALT 637 FOR MEDICARE OP): Mod: SE | Performed by: NURSE ANESTHETIST, CERTIFIED REGISTERED

## 2025-03-03 PROCEDURE — 07D78ZX EXTRACTION OF THORAX LYMPHATIC, VIA NATURAL OR ARTIFICIAL OPENING ENDOSCOPIC, DIAGNOSTIC: ICD-10-PCS | Performed by: STUDENT IN AN ORGANIZED HEALTH CARE EDUCATION/TRAINING PROGRAM

## 2025-03-03 PROCEDURE — 88312 SPECIAL STAINS GROUP 1: CPT | Mod: TC,SUR | Performed by: STUDENT IN AN ORGANIZED HEALTH CARE EDUCATION/TRAINING PROGRAM

## 2025-03-03 PROCEDURE — 7100000010 HC PHASE TWO TIME - EACH INCREMENTAL 1 MINUTE

## 2025-03-03 RX ORDER — ONDANSETRON HYDROCHLORIDE 2 MG/ML
4 INJECTION, SOLUTION INTRAVENOUS ONCE AS NEEDED
OUTPATIENT
Start: 2025-03-03

## 2025-03-03 RX ORDER — FLUDEOXYGLUCOSE F 18 200 MCI/ML
12.84 INJECTION, SOLUTION INTRAVENOUS
Status: COMPLETED | OUTPATIENT
Start: 2025-03-03 | End: 2025-03-03

## 2025-03-03 RX ORDER — ACETAMINOPHEN 325 MG/1
650 TABLET ORAL EVERY 4 HOURS PRN
OUTPATIENT
Start: 2025-03-03

## 2025-03-03 RX ORDER — MIDAZOLAM HYDROCHLORIDE 1 MG/ML
INJECTION INTRAMUSCULAR; INTRAVENOUS AS NEEDED
Status: DISCONTINUED | OUTPATIENT
Start: 2025-03-03 | End: 2025-03-03

## 2025-03-03 RX ORDER — SODIUM CHLORIDE, SODIUM LACTATE, POTASSIUM CHLORIDE, CALCIUM CHLORIDE 600; 310; 30; 20 MG/100ML; MG/100ML; MG/100ML; MG/100ML
100 INJECTION, SOLUTION INTRAVENOUS CONTINUOUS
OUTPATIENT
Start: 2025-03-03 | End: 2025-03-03

## 2025-03-03 RX ORDER — FENTANYL CITRATE 50 UG/ML
INJECTION, SOLUTION INTRAMUSCULAR; INTRAVENOUS AS NEEDED
Status: DISCONTINUED | OUTPATIENT
Start: 2025-03-03 | End: 2025-03-03

## 2025-03-03 RX ORDER — ALBUTEROL SULFATE 90 UG/1
INHALANT RESPIRATORY (INHALATION) AS NEEDED
Status: DISCONTINUED | OUTPATIENT
Start: 2025-03-03 | End: 2025-03-03

## 2025-03-03 RX ORDER — ROCURONIUM BROMIDE 10 MG/ML
INJECTION, SOLUTION INTRAVENOUS AS NEEDED
Status: DISCONTINUED | OUTPATIENT
Start: 2025-03-03 | End: 2025-03-03

## 2025-03-03 RX ORDER — ALBUTEROL SULFATE 0.83 MG/ML
2.5 SOLUTION RESPIRATORY (INHALATION) ONCE AS NEEDED
OUTPATIENT
Start: 2025-03-03

## 2025-03-03 RX ORDER — LIDOCAINE HYDROCHLORIDE 20 MG/ML
INJECTION, SOLUTION INFILTRATION; PERINEURAL AS NEEDED
Status: DISCONTINUED | OUTPATIENT
Start: 2025-03-03 | End: 2025-03-03

## 2025-03-03 RX ORDER — PROPOFOL 10 MG/ML
INJECTION, EMULSION INTRAVENOUS CONTINUOUS PRN
Status: DISCONTINUED | OUTPATIENT
Start: 2025-03-03 | End: 2025-03-03

## 2025-03-03 RX ORDER — HYDROMORPHONE HYDROCHLORIDE 0.2 MG/ML
0.2 INJECTION INTRAMUSCULAR; INTRAVENOUS; SUBCUTANEOUS EVERY 5 MIN PRN
OUTPATIENT
Start: 2025-03-03

## 2025-03-03 RX ORDER — LIDOCAINE HYDROCHLORIDE 10 MG/ML
0.1 INJECTION, SOLUTION INFILTRATION; PERINEURAL ONCE
OUTPATIENT
Start: 2025-03-03 | End: 2025-03-03

## 2025-03-03 RX ADMIN — PROPOFOL 200 MG: 10 INJECTION, EMULSION INTRAVENOUS at 13:53

## 2025-03-03 RX ADMIN — ROCURONIUM 60 MG: 50 INJECTION, SOLUTION INTRAVENOUS at 13:55

## 2025-03-03 RX ADMIN — PROPOFOL 125 MCG/KG/MIN: 10 INJECTION, EMULSION INTRAVENOUS at 13:57

## 2025-03-03 RX ADMIN — FENTANYL CITRATE 50 MCG: 50 INJECTION, SOLUTION INTRAMUSCULAR; INTRAVENOUS at 13:53

## 2025-03-03 RX ADMIN — PREDNISONE 60 MG: 20 TABLET ORAL at 09:46

## 2025-03-03 RX ADMIN — CHOLECALCIFEROL TAB 25 MCG (1000 UNIT) 5000 UNITS: 25 TAB at 09:46

## 2025-03-03 RX ADMIN — SUGAMMADEX 200 MG: 100 INJECTION, SOLUTION INTRAVENOUS at 14:49

## 2025-03-03 RX ADMIN — ALBUTEROL SULFATE 5 PUFF: 90 AEROSOL, METERED RESPIRATORY (INHALATION) at 14:00

## 2025-03-03 RX ADMIN — MIDAZOLAM HYDROCHLORIDE 2 MG: 1 INJECTION, SOLUTION INTRAMUSCULAR; INTRAVENOUS at 13:47

## 2025-03-03 RX ADMIN — SODIUM CHLORIDE: 9 INJECTION, SOLUTION INTRAVENOUS at 13:47

## 2025-03-03 RX ADMIN — PROPOFOL 50 MG: 10 INJECTION, EMULSION INTRAVENOUS at 13:55

## 2025-03-03 RX ADMIN — PROPOFOL 20 MG: 10 INJECTION, EMULSION INTRAVENOUS at 14:44

## 2025-03-03 RX ADMIN — LIDOCAINE HYDROCHLORIDE 60 MG: 20 INJECTION, SOLUTION INFILTRATION; PERINEURAL at 13:53

## 2025-03-03 RX ADMIN — FLUDEOXYGLUCOSE F 18 12.84 MILLICURIE: 200 INJECTION, SOLUTION INTRAVENOUS at 07:57

## 2025-03-03 ASSESSMENT — PAIN SCALES - GENERAL
PAINLEVEL_OUTOF10: 0 - NO PAIN

## 2025-03-03 ASSESSMENT — PAIN - FUNCTIONAL ASSESSMENT
PAIN_FUNCTIONAL_ASSESSMENT: 0-10
PAIN_FUNCTIONAL_ASSESSMENT: 0-10

## 2025-03-03 ASSESSMENT — ACTIVITIES OF DAILY LIVING (ADL): LACK_OF_TRANSPORTATION: NO

## 2025-03-03 NOTE — DISCHARGE INSTRUCTIONS
Dear Mr. Singh,    You presented to our hospital with abnormal spinal cord MRI signal found on 2/22. You were admitted to under go further workup of the etiology of the spinal cord lesion, which included positron emission tomography (PET), brain and cervical spine magnetic resonance imaging (MRI), lumbar puncture for spinal fluid analysis, and bronchoscopy for biopsy of the granuloma at your lung.     In terms of the remarkable results available so far, your PET showed no other hypermetabolic activity that might suggest a cancer. Brain and cervical spine MRI revealed a signal in your brain, but it is likely non-specific. The results of spinal fluid analysis and pathology report of your lung granuloma are still pending, and will be discussed in detail at your outpatient clinic. Also we ordered a lab test called T- spot for TB, this test should be done before start you on immunosuppressive treatment if needed    For home medications, you will be discharged with the medications as below  - Prednisolone      3/4 - 3/17: 60mg once daily     3/18 - 3/31: 50mg once daily  - Bactrim which is an antibiotic you take every other day to prevent you from infection as you will be on high dose of steroid    - Pantoprazole 40mg once daily before breakfast  - Cholecalciferol (Vitamin D) 5000 units once daily.     Appointment/ follow ups  You will be followed up at our neuroimmunology clinic on (3/13/2025)  and do outpatient physical therapy and occupational therapy.    It was a pleasure taking care of you   Your  neurology care team

## 2025-03-03 NOTE — ANESTHESIA PREPROCEDURE EVALUATION
Patient: Rafael Singh    Procedure Information       Date/Time: 03/03/25 1345    Scheduled providers: Lalo Bullard MD    Procedure: BRONCHOSCOPY    Location: [facility]            Relevant Problems   Anesthesia (within normal limits)      Cardiac   (-) Dysrhythmias   (-) HTN (hypertension)      Pulmonary  Remote has needed inhaler but it's rare, only uses prn   (+) Chronic obstructive pulmonary disease (Multi)      Neuro   (+) Cervical radiculopathy   (+) Lumbago with sciatica, left side   (+) Lumbago with sciatica, right side      Musculoskeletal   (+) DDD (degenerative disc disease), lumbar   (+) Spondylosis       Clinical information reviewed:   Tobacco  Allergies  Meds   Med Hx  Surg Hx   Fam Hx  Soc Hx        NPO Detail:  NPO/Void Status  Date of Last Liquid: 03/03/25  Time of Last Liquid: 0000 (sip ofwater w med)  Date of Last Solid: 03/02/25  Time of Last Solid: 2200         Physical Exam    Airway  Mallampati: I  TM distance: >3 FB  Neck ROM: full     Cardiovascular   Rhythm: regular  Rate: normal     Dental    Pulmonary   Breath sounds clear to auscultation     Abdominal          Anesthesia Plan    History of general anesthesia?: yes  History of complications of general anesthesia?: no    ASA 2     general   (GA )  intravenous induction   Anesthetic plan and risks discussed with patient and spouse.

## 2025-03-03 NOTE — PROGRESS NOTES
"Rafael Singh is a 54 y.o. male on day 4 of admission presenting with Edema of spinal cord.    Subjective   Patient was seen and is doing well today.   He still feels heaviness in his feet and unsteady gait, but reports improved balance.   No acute events overnight.       Objective     Last Recorded Vitals  Blood pressure 144/73, pulse 62, temperature 36.6 °C (97.9 °F), temperature source Temporal, resp. rate 18, height 1.753 m (5' 9\"), weight 83.9 kg (184 lb 15.5 oz), SpO2 97%.  Intake/Output last 3 Shifts:  No intake/output data recorded.    Relevant Results        MENTAL STATUS:  General appearance: No acute distress  Orientation: A&Ox4  Language: Expression, comprehension intact     CRANIAL NERVES:  - II/III: PERRL  - II:   .. Visual fields intact to confrontation bilaterally tested individually and together  .. 3/3 Visual acuity R20/20, L20/20, no color desaturation or painful eyemovement  - III, IV, VI: EOM full to pursuit without nystagmus  - V: V1-V3 sensation intact bilaterally  - VII: Face muscles symmetric with smile and eye closure  - VIII: Intact to interview  - IX, X: Palate elevated symmetrically bilaterally, no hoarseness  - XI: 5/5 strength on shoulder shrugging bilaterally  - XII: Tongue midline without atrophy or fasciculation     MOTOR: Tone and bulk normal in all extremities. No tremor, no abnormal movements.     STRENGTH:      R          L  Deltoid             5          5  Biceps              5          5  Triceps             5          5                    5          5  Hip abduction  5          5  Hip adduction  5          5  Hip flexion        5          5  Quadriceps       5          5  Hamstrings       5           5  DorsiFlex           5           5  PlantarFlex        5            5     REFLEXES: Brisker over lower extremities                            R             L  Biceps              +2        +2  Triceps             +2        +2  Brachioradialis  +2        +2  Patellar       "       +3        +3  Achilles              3           3     No Babinski signs  No clonus     COORDINATION:   Intact on finger to nose bl, intact on heel to shin Romberg +   Gait wide based, can't walk on heels due to imbalance    SENSORY:   - No sensory level with pinprick in the front or vibration in the back  - Impaired proprioception of bilateral toes (inconsistently miss 2/5 in bilateral feet)  - Intact to light touch, pin prick, vibration in bl UE and LE.    Workup to date include:   - Brain MRI showed non enhancing supratentorial white matter FLAIR.   - C+T+L MRI w/wo contrast showed a small enhancing lesion around T11-12 with edema. No demyelinating lesion at C and L spine.   - CT chest showed remote granulomatous disease.    Results for orders placed or performed during the hospital encounter of 02/27/25 (from the past 24 hours)   POCT GLUCOSE   Result Value Ref Range    POCT Glucose 134 (H) 74 - 99 mg/dL   POCT GLUCOSE   Result Value Ref Range    POCT Glucose 121 (H) 74 - 99 mg/dL   POCT GLUCOSE   Result Value Ref Range    POCT Glucose 95 74 - 99 mg/dL        Assessment/Plan   Assessment & Plan  Edema of spinal cord    This is a 54 y.o. male with PMH of bilateral optic neuritis in 2020, presenting with spinal cord hyperintensity lesion found on 2/22, c/f NMO/MOGAD (or relatively unlikely MS) v.s. sarcoidosis v.s. malignancy.     His neuro exam was remarkable for positive romberg and wide based gait, likely sensory ataxia. His vision was 20/20 bilaterally. There was no focal weakness or sensory level. Urinary and bowel movement was normal and continent. There was no saddle anesthesia.    Workup to date include: C+T+L MRI w/wo contrast showed a small enhancing lesion around T11-12 with edema. Brain MRI showed non specific non enhancing supratentorial white matter FLAIR. CT chest showed remote granulomatous disease. Labs was remarkable for Copper 65, but considering the absence of other signs of copper  deficiency (leukopenia, anemia), and his hx of bilateral optic neuritis, copper deficiency related myelopathy is still low on differential.    Considering his history of bilateral optic neuritis and current non longitudinal extensive T11-12 enhancing lesion, and chest granuloma, differential diagnoses include NMO/MOGAD (or relatively unlikely MS) v.s. sarcoidosis v.s. malignancy.     He underwent Broncho with EBUS and PET today to search for sarcoidosis and malignancy. LP was scheduled to r/o demyelinating/autoimmune processes.     Update on 3/3  - Bronchoscopy and PET today  - LP today if time allows.  - Continue with prednisolone 60 mg (2/28-)     #Spinal cord lesion and past marcin optic neuritis, c/f NMO/MOGAD v.s. sarcoid v.s. malignancy  :: Romberg+, sensory ataxia, no focal weakness  :: CT chest 2/22/25: remote granulomatous disease   :: Brain MRI: Non enhancing supratentorial white matter FLAIR hyperintense over inferior corpus callosum  :: C+T+L spine MRI: small enhancing lesion at central-dorsal midline of the spinal cord with intramedullary cord edema. No hyperintensities over C+L spine   ::  Brain and C spine MRI: Non enhancing supratentorial white matter FLAIR hyperintense over inferior corpus callosum. No demyelination of C spine  :: Copper: 65.4, no leukopenia or anemia  Plan  - LP today: send Coags and plt WNL; send flow cytometry, cytology, OCB, IgG index, paraneoplastic panel  - Follow formal PET reads (done on 3/3)  - Follow path report of lung granuloma   - PT/OT recs low intensity outpatient PT  - Meds  - Predni 60mg, 2/28-, monitor BG with SSI, on PPI  - VitD 5000IU daily  [ ] serum demyelinating panel,        F: prn  E: prn  N: Regular  A: PIV     GI: PPI  DVT PPX: SCDs      CODE STATUS: FULL CODE (confirmed on admission)  NOK: Rosa Isela Singh (spouse) [phone]           JUAN RAMON PIKE    I personally saw, examined, and discussed the patient above. I have reviewed and agree with the medical student  note above. All edits or corrections have been made directly into the note, including the physical exam and assessment/plan. Patient was seen and discussed with the attending, Dr. Mcghee, who agrees with the management plan.     Mao Horton MD  PGY-1, Neurology     ----------------------------------------------------------------------------------------------------------------------------------------  ATTENDING ATTESTATION    I saw and evaluated the patient. I personally obtained the key and critical portions of the history and physical exam or was physically present for key and critical portions performed by the resident/fellow. I reviewed the resident/fellow's documentation and discussed the patient with the resident/fellow. I agree with the resident/fellow's medical decision making as documented in the note with the exception/addition of the following:     Mr. Singh is a 54-year-old man with a history of bilateral optic neuritis who presented with gait imbalance and lumbar back pain. MRI thoracic spine performed outpatient was personally reviewed and demonstrates a foci of enhancement in the distal thoracic spine with surrounding edema. He has previously received 3 doses of 1g solumedrol at [facility].     On exam he has gait instability with positive romberg. No weakness or sensory deficits.    Ongoing work up for inflammatory etiology such as NMO/MOGAD Sarcoidosis also possible in the setting of a lung granuloma seen on his CT chest and bronchoscopy planned. PET scan without evidence of hypermetabolic activity except at the spinal cord lesion. LP planned. Will continue on prednisone.     Nikolay Mcghee MD  General Neurology Attending  [facility]    Hospital Subsequent Daily Care Admission and Consult CODING and DOCUMENTATION DETERMINATION  Total time including some or all of the following: preparing to see the patient and reviewing test, obtaining and reviewing separately  obtained history, performing medically proper examination and/or evaluation, counseling and educating one or more of the following---patient &family, independently interpreting results and communicating results to one or more of the following patient & family, and care coordination was equal to or greater than 35 minutes.

## 2025-03-03 NOTE — CARE PLAN
The patient's goals for the shift include  NPO at midnight    The clinical goals for the shift include safety    Problem: Fall/Injury  Goal: Not fall by end of shift  Outcome: Progressing  Goal: Be free from injury by end of the shift  Outcome: Progressing  Goal: Verbalize understanding of personal risk factors for fall in the hospital  Outcome: Progressing  Goal: Verbalize understanding of risk factor reduction measures to prevent injury from fall in the home  Outcome: Progressing  Goal: Use assistive devices by end of the shift  Outcome: Progressing  Goal: Pace activities to prevent fatigue by end of the shift  Outcome: Progressing     Problem: Pain - Adult  Goal: Verbalizes/displays adequate comfort level or baseline comfort level  Outcome: Progressing     Problem: Safety - Adult  Goal: Free from fall injury  Outcome: Progressing     Problem: Discharge Planning  Goal: Discharge to home or other facility with appropriate resources  Outcome: Progressing     Problem: Chronic Conditions and Co-morbidities  Goal: Patient's chronic conditions and co-morbidity symptoms are monitored and maintained or improved  Outcome: Progressing     Problem: Nutrition  Goal: Nutrient intake appropriate for maintaining nutritional needs  Outcome: Progressing     Problem: Skin  Goal: Participates in plan/prevention/treatment measures  Outcome: Progressing  Goal: Prevent/manage excess moisture  Outcome: Progressing  Goal: Prevent/minimize sheer/friction injuries  Outcome: Progressing

## 2025-03-03 NOTE — ANESTHESIA PROCEDURE NOTES
Airway  Date/Time: 3/3/2025 1:58 PM  Urgency: elective    Airway not difficult    Staffing  Performed: AWILDA and CAA   Authorized by: Domi Carlos MD    Performed by: HIPOLITO Hernandez-GRAEME  Patient location during procedure: OR    Indications and Patient Condition  Indications for airway management: anesthesia and airway protection  Spontaneous Ventilation: absent  Sedation level: deep  Preoxygenated: yes  Patient position: sniffing  MILS not maintained throughout  Mask difficulty assessment: 1 - vent by mask    Final Airway Details  Final airway type: endotracheal airway      Successful airway: ETT     Successful intubation technique: direct laryngoscopy  Endotracheal tube insertion site: oral  Blade: Eladio  Blade size: #4  ETT size (mm): 8.5  Cormack-Lehane Classification: grade IIa - partial view of glottis  Placement verified by: chest auscultation and capnometry   Measured from: lips  ETT to lips (cm): 22  Number of attempts at approach: 1

## 2025-03-03 NOTE — ANESTHESIA POSTPROCEDURE EVALUATION
Patient: Rafael Singh    Procedure Summary       Date: 03/03/25 Room / Location: [facility]    Anesthesia Start: 1347 Anesthesia Stop: 1501    Procedure: BRONCHOSCOPY Diagnosis: Edema of spinal cord    Scheduled Providers: Lalo Bullard MD Responsible Provider: Domi Carlos MD    Anesthesia Type: general ASA Status: 2            Anesthesia Type: general    Vitals Value Taken Time   /82 03/03/25 1527   Temp 36 °C (96.8 °F) 03/03/25 1457   Pulse 54 03/03/25 1527   Resp 16 03/03/25 1527   SpO2 99 % 03/03/25 1527       Anesthesia Post Evaluation    Patient location during evaluation: PACU  Patient participation: complete - patient participated  Level of consciousness: awake and alert  Pain management: adequate  Airway patency: patent  Cardiovascular status: acceptable  Respiratory status: acceptable  Hydration status: acceptable  Postoperative Nausea and Vomiting: none        There were no known notable events for this encounter.

## 2025-03-03 NOTE — PROGRESS NOTES
03/03/25 1600   Discharge Planning   Living Arrangements Spouse/significant other   Support Systems Spouse/significant other   Assistance Needed no   Type of Residence Private residence   Number of Stairs to Enter Residence 2   Number of Stairs Within Residence 14   Do you have animals or pets at home? No   Who is requesting discharge planning? Provider   Home or Post Acute Services None   Expected Discharge Disposition Home   Does the patient need discharge transport arranged? No   Financial Resource Strain   How hard is it for you to pay for the very basics like food, housing, medical care, and heating? Not hard   Housing Stability   In the last 12 months, was there a time when you were not able to pay the mortgage or rent on time? N   At any time in the past 12 months, were you homeless or living in a shelter (including now)? N   Transportation Needs   In the past 12 months, has lack of transportation kept you from medical appointments or from getting medications? no   In the past 12 months, has lack of transportation kept you from meetings, work, or from getting things needed for daily living? No     Met with pt and introduced myself as care coordinator with Care Transitions Team for discharge planning. Pt reports being independent with ADL's. Pt is independent with ambulation. Pt Resides with Significant Other. Pt reported no safety concerns at home, he stated no falls in last year. Pt's address, phone number, and contact information was verified.      PCP: Nimisha  DATE OF LAST VISIT: 1 Week Ago  PHARMACY: [facility]   MEDICATIONS AFFORDABLE:Yes  FEELS SAFE AT HOME: Yes  RECENT FALLS: n/a  EQUIPMENT USED IN HOME: n/a  HOME O2/CPAP/NEBS: n/a  DME SUPPLIER: n/a  TRANSPORT HOME: No  DIABETIC/SUPPLIES NEEDED: n/a  HD SCHEDULE: n/a    Transitional Care Coordinator Note: Patient discussed with medical team, per medical team patient is not medically ready. Discharge dispo: [facility] 1-2 Days.  Gee Clark  RN  Transitional Care Coordinator

## 2025-03-04 ENCOUNTER — PHARMACY VISIT (OUTPATIENT)
Dept: PHARMACY | Facility: CLINIC | Age: 55
End: 2025-03-04
Payer: MEDICAID

## 2025-03-04 VITALS
DIASTOLIC BLOOD PRESSURE: 77 MMHG | OXYGEN SATURATION: 99 % | TEMPERATURE: 97.3 F | BODY MASS INDEX: 27.4 KG/M2 | SYSTOLIC BLOOD PRESSURE: 131 MMHG | HEIGHT: 69 IN | WEIGHT: 184.97 LBS | RESPIRATION RATE: 18 BRPM | HEART RATE: 63 BPM

## 2025-03-04 DIAGNOSIS — G95.9 SPINAL CORD LESION (MULTI): ICD-10-CM

## 2025-03-04 LAB
ALBUMIN SERPL BCP-MCNC: 4.1 G/DL (ref 3.4–5)
ANION GAP SERPL CALC-SCNC: 14 MMOL/L (ref 10–20)
APPEARANCE CSF: ABNORMAL
APPEARANCE CSF: CLEAR
BASOPHILS # BLD AUTO: 0.03 X10*3/UL (ref 0–0.1)
BASOPHILS NFR BLD AUTO: 0.2 %
BASOPHILS NFR CSF MANUAL: 0 %
BASOPHILS NFR CSF MANUAL: 0 %
BLASTS CSF MANUAL: 0 %
BLASTS CSF MANUAL: 0 %
BUN SERPL-MCNC: 15 MG/DL (ref 6–23)
CALCIUM SERPL-MCNC: 9.4 MG/DL (ref 8.6–10.6)
CHLORIDE SERPL-SCNC: 104 MMOL/L (ref 98–107)
CO2 SERPL-SCNC: 27 MMOL/L (ref 21–32)
COLOR CSF: ABNORMAL
COLOR CSF: COLORLESS
COLOR SPUN CSF: COLORLESS
COLOR SPUN CSF: COLORLESS
CREAT SERPL-MCNC: 1.09 MG/DL (ref 0.5–1.3)
EGFRCR SERPLBLD CKD-EPI 2021: 82 ML/MIN/1.73M*2
EOSINOPHIL # BLD AUTO: 0.05 X10*3/UL (ref 0–0.7)
EOSINOPHIL NFR BLD AUTO: 0.4 %
EOSINOPHIL NFR CSF MANUAL: 0 %
EOSINOPHIL NFR CSF MANUAL: 0 %
ERYTHROCYTE [DISTWIDTH] IN BLOOD BY AUTOMATED COUNT: 13.4 % (ref 11.5–14.5)
GLUCOSE BLD MANUAL STRIP-MCNC: 100 MG/DL (ref 74–99)
GLUCOSE CSF-MCNC: 66 MG/DL (ref 40–70)
GLUCOSE SERPL-MCNC: 94 MG/DL (ref 74–99)
HBV CORE AB SER QL: NONREACTIVE
HBV SURFACE AB SER-ACNC: <3.1 MIU/ML
HBV SURFACE AG SERPL QL IA: NONREACTIVE
HCT VFR BLD AUTO: 40.9 % (ref 41–52)
HCV AB SER QL: NONREACTIVE
HGB BLD-MCNC: 13.5 G/DL (ref 13.5–17.5)
IMM GRANULOCYTES # BLD AUTO: 0.15 X10*3/UL (ref 0–0.7)
IMM GRANULOCYTES NFR BLD AUTO: 1.2 % (ref 0–0.9)
IMM GRANULOCYTES NFR CSF: 0 %
IMM GRANULOCYTES NFR CSF: 0 %
LDH CSF L TO P-CCNC: <25 U/L
LYMPHOCYTES # BLD AUTO: 3.51 X10*3/UL (ref 1.2–4.8)
LYMPHOCYTES NFR BLD AUTO: 26.9 %
LYMPHOCYTES NFR CSF MANUAL: 13 % (ref 28–96)
LYMPHOCYTES NFR CSF MANUAL: 73 % (ref 28–96)
MAGNESIUM SERPL-MCNC: 2.22 MG/DL (ref 1.6–2.4)
MCH RBC QN AUTO: 27.6 PG (ref 26–34)
MCHC RBC AUTO-ENTMCNC: 33 G/DL (ref 32–36)
MCV RBC AUTO: 84 FL (ref 80–100)
MONOCYTES # BLD AUTO: 0.97 X10*3/UL (ref 0.1–1)
MONOCYTES NFR BLD AUTO: 7.4 %
MONOS+MACROS NFR CSF MANUAL: 3 % (ref 16–56)
MONOS+MACROS NFR CSF MANUAL: 9 % (ref 16–56)
NEUTROPHILS # BLD AUTO: 8.33 X10*3/UL (ref 1.2–7.7)
NEUTROPHILS NFR BLD AUTO: 63.9 %
NEUTS SEG NFR CSF MANUAL: 18 % (ref 0–5)
NEUTS SEG NFR CSF MANUAL: 84 % (ref 0–5)
NRBC BLD-RTO: 0 /100 WBCS (ref 0–0)
OTHER CELLS NFR CSF MANUAL: 0 %
OTHER CELLS NFR CSF MANUAL: 0 %
PATH REVIEW-CELL CT,FLUID: NORMAL
PHOSPHATE SERPL-MCNC: 3.4 MG/DL (ref 2.5–4.9)
PLASMA CELLS NFR CSF MICRO: 0 %
PLASMA CELLS NFR CSF MICRO: 0 %
PLATELET # BLD AUTO: 301 X10*3/UL (ref 150–450)
POTASSIUM SERPL-SCNC: 3.9 MMOL/L (ref 3.5–5.3)
PROT CSF-MCNC: 57 MG/DL (ref 15–45)
RBC # BLD AUTO: 4.89 X10*6/UL (ref 4.5–5.9)
RBC # CSF AUTO: 336 /UL (ref 0–5)
RBC # CSF AUTO: ABNORMAL /UL (ref 0–5)
SODIUM SERPL-SCNC: 141 MMOL/L (ref 136–145)
TOTAL CELLS COUNTED CSF: 100
TOTAL CELLS COUNTED CSF: 100
TUBE # CSF: ABNORMAL
TUBE # CSF: ABNORMAL
VARICELLA ZOSTER IGG INDEX: 6.4 IA
VZV IGG SER QL IA: POSITIVE
WBC # BLD AUTO: 13 X10*3/UL (ref 4.4–11.3)
WBC # CSF AUTO: 29 /UL (ref 1–5)
WBC # CSF AUTO: 3 /UL (ref 1–5)

## 2025-03-04 PROCEDURE — 2500000001 HC RX 250 WO HCPCS SELF ADMINISTERED DRUGS (ALT 637 FOR MEDICARE OP): Mod: SE

## 2025-03-04 PROCEDURE — 88185 FLOWCYTOMETRY/TC ADD-ON: CPT | Mod: TC

## 2025-03-04 PROCEDURE — 2500000004 HC RX 250 GENERAL PHARMACY W/ HCPCS (ALT 636 FOR OP/ED): Mod: SE

## 2025-03-04 PROCEDURE — 009U3ZX DRAINAGE OF SPINAL CANAL, PERCUTANEOUS APPROACH, DIAGNOSTIC: ICD-10-PCS | Performed by: STUDENT IN AN ORGANIZED HEALTH CARE EDUCATION/TRAINING PROGRAM

## 2025-03-04 PROCEDURE — 84157 ASSAY OF PROTEIN OTHER: CPT

## 2025-03-04 PROCEDURE — 99232 SBSQ HOSP IP/OBS MODERATE 35: CPT | Performed by: STUDENT IN AN ORGANIZED HEALTH CARE EDUCATION/TRAINING PROGRAM

## 2025-03-04 PROCEDURE — 88112 CYTOPATH CELL ENHANCE TECH: CPT | Mod: TC,MCY

## 2025-03-04 PROCEDURE — 83916 OLIGOCLONAL BANDS: CPT

## 2025-03-04 PROCEDURE — 36415 COLL VENOUS BLD VENIPUNCTURE: CPT

## 2025-03-04 PROCEDURE — 80069 RENAL FUNCTION PANEL: CPT

## 2025-03-04 PROCEDURE — 83735 ASSAY OF MAGNESIUM: CPT

## 2025-03-04 PROCEDURE — 89051 BODY FLUID CELL COUNT: CPT

## 2025-03-04 PROCEDURE — 85025 COMPLETE CBC W/AUTO DIFF WBC: CPT

## 2025-03-04 PROCEDURE — RXMED WILLOW AMBULATORY MEDICATION CHARGE

## 2025-03-04 PROCEDURE — 82947 ASSAY GLUCOSE BLOOD QUANT: CPT

## 2025-03-04 PROCEDURE — 83615 LACTATE (LD) (LDH) ENZYME: CPT

## 2025-03-04 PROCEDURE — 82945 GLUCOSE OTHER FLUID: CPT

## 2025-03-04 PROCEDURE — 86803 HEPATITIS C AB TEST: CPT | Performed by: STUDENT IN AN ORGANIZED HEALTH CARE EDUCATION/TRAINING PROGRAM

## 2025-03-04 PROCEDURE — 99232 SBSQ HOSP IP/OBS MODERATE 35: CPT

## 2025-03-04 RX ORDER — ACETAMINOPHEN 500 MG
5000 TABLET ORAL DAILY
Qty: 30 TABLET | Refills: 1 | Status: SHIPPED | OUTPATIENT
Start: 2025-03-05 | End: 2025-05-04

## 2025-03-04 RX ORDER — PANTOPRAZOLE SODIUM 40 MG/1
40 TABLET, DELAYED RELEASE ORAL
Qty: 30 TABLET | Refills: 1 | Status: SHIPPED | OUTPATIENT
Start: 2025-03-05 | End: 2025-05-04

## 2025-03-04 RX ORDER — PREDNISONE 10 MG/1
TABLET ORAL
Qty: 308 TABLET | Refills: 0 | Status: SHIPPED | OUTPATIENT
Start: 2025-03-05 | End: 2025-06-11

## 2025-03-04 RX ORDER — MORPHINE SULFATE 4 MG/ML
2 INJECTION INTRAVENOUS EVERY 4 HOURS PRN
Status: DISCONTINUED | OUTPATIENT
Start: 2025-03-04 | End: 2025-03-04

## 2025-03-04 RX ORDER — SULFAMETHOXAZOLE AND TRIMETHOPRIM 400; 80 MG/1; MG/1
2 TABLET ORAL EVERY OTHER DAY
Qty: 14 TABLET | Refills: 0 | Status: SHIPPED | OUTPATIENT
Start: 2025-03-04 | End: 2025-03-18

## 2025-03-04 RX ADMIN — PREDNISONE 60 MG: 20 TABLET ORAL at 09:30

## 2025-03-04 RX ADMIN — CHOLECALCIFEROL TAB 25 MCG (1000 UNIT) 5000 UNITS: 25 TAB at 09:30

## 2025-03-04 RX ADMIN — PANTOPRAZOLE SODIUM 40 MG: 40 TABLET, DELAYED RELEASE ORAL at 05:52

## 2025-03-04 ASSESSMENT — PAIN SCALES - GENERAL
PAINLEVEL_OUTOF10: 0 - NO PAIN
PAINLEVEL_OUTOF10: 0 - NO PAIN

## 2025-03-04 NOTE — PROCEDURES
NEUROLOGY PROCEDURE NOTE    Procedure Name: Lumbar Puncture  Date: 3/4  Time of Procedure: 12:15 PM  Proceduralist(s):  Dr Matute   Assistant(s): Mao Horton MD  Indication(s): To rule out autoimmune, OCB and cytology   Consented?: Yes  Pre-Procedure Verification?: Yes    I reviewed the patient's CBC, coagulation profile, and active meds prior to the procedure.    The patient was positioned sitting up. The lower back was prepped and draped in the usual sterile fashion. A solution of 1% lidocaine was used to numb the region. Using landmarks, a 22-gauge Quincke spinal needle was inserted in the L4-5  innerspace and advanced into the subarachnoid space on 2  attempt(s). The stylet was removed. Four tubes containing a total approximate volume of 20 cc of clear fluid were collected and sent for analysis. The patient tolerated the procedure well; there were no immediate complications. Patient was instructed to rest supine for 1 hour.

## 2025-03-04 NOTE — CARE PLAN
The patient's goals for the shift include      The clinical goals for the shift include Pt will remain safe throughout the shift.    Problem: Fall/Injury  Goal: Not fall by end of shift  Outcome: Progressing  Goal: Be free from injury by end of the shift  Outcome: Progressing  Goal: Verbalize understanding of personal risk factors for fall in the hospital  Outcome: Progressing  Goal: Verbalize understanding of risk factor reduction measures to prevent injury from fall in the home  Outcome: Progressing  Goal: Use assistive devices by end of the shift  Outcome: Progressing  Goal: Pace activities to prevent fatigue by end of the shift  Outcome: Progressing     Problem: Pain - Adult  Goal: Verbalizes/displays adequate comfort level or baseline comfort level  Outcome: Progressing     Problem: Safety - Adult  Goal: Free from fall injury  Outcome: Progressing     Problem: Discharge Planning  Goal: Discharge to home or other facility with appropriate resources  Outcome: Progressing     Problem: Chronic Conditions and Co-morbidities  Goal: Patient's chronic conditions and co-morbidity symptoms are monitored and maintained or improved  Outcome: Progressing     Problem: Nutrition  Goal: Nutrient intake appropriate for maintaining nutritional needs  Outcome: Progressing     Problem: Skin  Goal: Participates in plan/prevention/treatment measures  Outcome: Progressing  Goal: Prevent/manage excess moisture  Outcome: Progressing  Goal: Prevent/minimize sheer/friction injuries  Outcome: Progressing

## 2025-03-04 NOTE — PROGRESS NOTES
"Rafael Singh is a 54 y.o. male on day 5 of admission presenting with Edema of spinal cord.    Subjective   Had his Bronchoscopy with EBUS yesterday with no complications, feeling fine this morning.     Objective     Physical Exam  GEN:  Alert and conversant; no acute distress  HEENT:  NCAT; moist mucus membranes  EYES:  Sclera anicteric, EOMI  PULM:  Symmetric chest rise, no increased work of breathing on room air, no respiratory distress  ABD:  Soft, non-tender, non-distended  MSK:  Moving all extremities spontaneously and to command  SKIN:  Warm, dry, well-perfused  PSYCH:  Appropriate mood and affect    Last Recorded Vitals  Blood pressure 131/77, pulse 63, temperature 36.3 °C (97.3 °F), temperature source Temporal, resp. rate 18, height 1.753 m (5' 9\"), weight 83.9 kg (184 lb 15.5 oz), SpO2 99%.    Intake/Output last 3 Shifts:  I/O last 3 completed shifts:  In: 200 (2.4 mL/kg) [IV Piggyback:200]  Out: - (0 mL/kg)   Weight: 83.9 kg       Assessment/Plan     Rafael Singh is a 54 y.o. male presenting with no significant PMH who presented with sensory symptoms of his limbs and was found to abnormal spinal cord signal.  On MRI concerning for transverse myelitis, Pulmonary team consulted to evaluate his calcified mediastinal lymph nodes seen on CT chest.     #Mediastinal calcified LN  #Transverse myelitis     -The pattern of his unilateral cacified pulmonary nodule and hailer LN is suggestive of previous infection histoplasmosis. However, sarcoidosis can not be ruled out based on CT images only.     -Bronchoscopy with EBUS was done on Monday 3/3/25, please follow up results.     Pulmonary team will sign off    Chidi Zurita MD      "

## 2025-03-04 NOTE — PROGRESS NOTES
"Rafael Singh is a 54 y.o. male on day 5 of admission presenting with Edema of spinal cord.    Subjective   Patient was seen and is doing well today.   He still feels heaviness in his feet and unsteady gait, but reports improved balance.   No acute events overnight.       Objective     Last Recorded Vitals  Blood pressure 119/79, pulse 87, temperature 36.4 °C (97.5 °F), temperature source Temporal, resp. rate 14, height 1.753 m (5' 9\"), weight 83.9 kg (184 lb 15.5 oz), SpO2 97%.  Intake/Output last 3 Shifts:  I/O last 3 completed shifts:  In: 200 (2.4 mL/kg) [IV Piggyback:200]  Out: - (0 mL/kg)   Weight: 83.9 kg     Relevant Results        MENTAL STATUS:  General appearance: No acute distress  Orientation: A&Ox4  Language: Expression, comprehension intact     CRANIAL NERVES:  - II/III: PERRL  - II:   .. Visual fields intact to confrontation bilaterally tested individually and together  .. 3/3 Visual acuity R20/20, L20/20, no color desaturation or painful eyemovement  - III, IV, VI: EOM full to pursuit without nystagmus  - V: V1-V3 sensation intact bilaterally  - VII: Face muscles symmetric with smile and eye closure  - VIII: Intact to interview  - IX, X: Palate elevated symmetrically bilaterally, no hoarseness  - XI: 5/5 strength on shoulder shrugging bilaterally  - XII: Tongue midline without atrophy or fasciculation     MOTOR: Tone and bulk normal in all extremities. No tremor, no abnormal movements.     STRENGTH:      R          L  Deltoid             5          5  Biceps              5          5  Triceps             5          5                    5          5  Hip abduction  5          5  Hip adduction  5          5  Hip flexion        5          5  Quadriceps       5          5  Hamstrings       5           5  DorsiFlex           5           5  PlantarFlex        5            5     REFLEXES: Brisker over lower extremities                            R             L  Biceps              +2        " +2  Triceps             +2        +2  Brachioradialis  +2        +2  Patellar             +3        +3  Achilles              3           3     No Babinski signs  No clonus     COORDINATION:   Intact on finger to nose bl, intact on heel to shin Romberg +   Gait wide based, can't walk on heels due to imbalance    SENSORY:   - No sensory level with pinprick in the front or vibration in the back  - Impaired proprioception of bilateral toes (inconsistently miss 2/5 in bilateral feet)  - Intact to light touch, pin prick, vibration in bl UE and LE.    Workup to date include:   - Brain MRI showed non enhancing supratentorial white matter FLAIR.   - C+T+L MRI w/wo contrast showed a small enhancing lesion around T11-12 with edema. No demyelinating lesion at C and L spine.   - CT chest showed remote granulomatous disease.    Results for orders placed or performed during the hospital encounter of 02/27/25 (from the past 24 hours)   CBC and Auto Differential   Result Value Ref Range    WBC 9.6 4.4 - 11.3 x10*3/uL    nRBC 0.0 0.0 - 0.0 /100 WBCs    RBC 4.59 4.50 - 5.90 x10*6/uL    Hemoglobin 13.0 (L) 13.5 - 17.5 g/dL    Hematocrit 38.8 (L) 41.0 - 52.0 %    MCV 85 80 - 100 fL    MCH 28.3 26.0 - 34.0 pg    MCHC 33.5 32.0 - 36.0 g/dL    RDW 13.5 11.5 - 14.5 %    Platelets 286 150 - 450 x10*3/uL    Neutrophils % 60.5 40.0 - 80.0 %    Immature Granulocytes %, Automated 1.3 (H) 0.0 - 0.9 %    Lymphocytes % 29.8 13.0 - 44.0 %    Monocytes % 7.9 2.0 - 10.0 %    Eosinophils % 0.2 0.0 - 6.0 %    Basophils % 0.3 0.0 - 2.0 %    Neutrophils Absolute 5.78 1.20 - 7.70 x10*3/uL    Immature Granulocytes Absolute, Automated 0.12 0.00 - 0.70 x10*3/uL    Lymphocytes Absolute 2.85 1.20 - 4.80 x10*3/uL    Monocytes Absolute 0.75 0.10 - 1.00 x10*3/uL    Eosinophils Absolute 0.02 0.00 - 0.70 x10*3/uL    Basophils Absolute 0.03 0.00 - 0.10 x10*3/uL   Renal function panel   Result Value Ref Range    Glucose 86 74 - 99 mg/dL    Sodium 141 136 - 145 mmol/L     Potassium 3.7 3.5 - 5.3 mmol/L    Chloride 105 98 - 107 mmol/L    Bicarbonate 28 21 - 32 mmol/L    Anion Gap 12 10 - 20 mmol/L    Urea Nitrogen 15 6 - 23 mg/dL    Creatinine 0.83 0.50 - 1.30 mg/dL    eGFR >90 >60 mL/min/1.73m*2    Calcium 8.8 8.6 - 10.6 mg/dL    Phosphorus 3.8 2.5 - 4.9 mg/dL    Albumin 3.9 3.4 - 5.0 g/dL   Magnesium   Result Value Ref Range    Magnesium 2.23 1.60 - 2.40 mg/dL   POCT GLUCOSE   Result Value Ref Range    POCT Glucose 100 (H) 74 - 99 mg/dL   POCT GLUCOSE   Result Value Ref Range    POCT Glucose 113 (H) 74 - 99 mg/dL   Respiratory Culture/Smear    Specimen: BRONCHO-ALVEOLAR LAVAGE - LINGULA; Fluid   Result Value Ref Range    Gram Stain (2+) Few Polymorphonuclear leukocytes     Gram Stain No organisms seen    Body Fluid Cell Count   Result Value Ref Range    Color, Fluid Colorless Colorless, Straw, Yellow    Clarity, Fluid Clear Clear    WBC, Fluid 61 See Comment /uL    RBC, Fluid 1,000 see comment /uL   Body Fluid Differential   Result Value Ref Range    Neutrophils %, Manual, Fluid 2 see comment %    Lymphocytes %, Manual, Fluid 40 see comment %    Mono/Macrophages %, Manual, Fluid 58 see comment %    Eosinophils %, Manual, Fluid 0 see comment %    Basophils %, Manual, Fluid 0 not established %    Immature Granulocytes %, Manual, Fluid 0 not established %    Blasts %, Manual, Fluid 0 not established %    Unclassified Cells %, Manual, Fluid 0 not established %    Plasma Cells %, Manual, Fluid 0 not established %    Total Cells Counted, Fluid 100    POCT GLUCOSE   Result Value Ref Range    POCT Glucose 117 (H) 74 - 99 mg/dL   POCT GLUCOSE   Result Value Ref Range    POCT Glucose 197 (H) 74 - 99 mg/dL        Assessment/Plan   Assessment & Plan  Edema of spinal cord    Chronic obstructive pulmonary disease (Multi)    This is a 54 y.o. male with PMH of bilateral optic neuritis in 2020, presenting with spinal cord hyperintensity lesion found on 2/22, c/f NMO/MOGAD (or relatively unlikely  MS) v.s. sarcoidosis v.s. malignancy.     His neuro exam was remarkable for positive romberg and wide based gait, likely sensory ataxia. His vision was 20/20 bilaterally. There was no focal weakness or sensory level. Urinary and bowel movement was normal and continent. There was no saddle anesthesia.    Workup to date include: C+T+L MRI w/wo contrast showed a small enhancing lesion around T11-12 with edema. Brain MRI showed non specific non enhancing supratentorial white matter FLAIR. CT chest showed remote granulomatous disease. Labs was remarkable for Copper 65, but considering the absence of other signs of copper deficiency (leukopenia, anemia), and his hx of bilateral optic neuritis, copper deficiency related myelopathy is still low on differential.    Considering his history of bilateral optic neuritis and current non longitudinal extensive T11-12 enhancing lesion, and chest granuloma, differential diagnoses include NMO/MOGAD (or relatively unlikely MS) v.s. sarcoidosis v.s. malignancy.     He underwent Broncho with EBUS and PET today to search for sarcoidosis and malignancy. LP was scheduled to r/o demyelinating/autoimmune processes.     Update on 3/4  - LP today; send flow cytometry, cytology, OCB, IgG index, paraneoplastic panel  - PET 3/3: No other hypermetabolic activity throughout the body   - Pending granuloma path  - DIspo today: home with OP PT/OT, follow at Dr. Miller’s neuroimmunology clinic  - Prednisolone taper 10mg/2week, current dose 60 mg (2/28-)     #Spinal cord lesion and past marcin optic neuritis, c/f NMO/MOGAD v.s. sarcoid v.s. malignancy  :: Romberg+, sensory ataxia, no focal weakness  :: CT chest 2/22/25: remote granulomatous disease   :: Brain MRI: Non enhancing supratentorial white matter FLAIR hyperintense over inferior corpus callosum  :: C+T+L spine MRI: small enhancing lesion at central-dorsal midline of the spinal cord with intramedullary cord edema. No hyperintensities over C+L spine    ::  Brain and C spine MRI: Non enhancing supratentorial white matter FLAIR hyperintense over inferior corpus callosum. No demyelination of C spine  :: PET 3/3: No other hypermetabolic activity throughout the body to suggest a  neoplastic process  :: Copper: 65.4, no leukopenia or anemia  Plan  - LP today: send Coags and plt WNL; send flow cytometry, cytology, OCB, IgG index, paraneoplastic panel  - Follow path report of lung granuloma   - Predni 60mg, 2/28-, on PPI, taper 10mg/week  - Dispo today: home, OP PT/OT, follow at Dr. Miller’s clinic  - VitD 5000IU daily  [ ] serum demyelinating panel        F: prn  E: prn  N: Regular  A: PIV     GI: PPI  DVT PPX: SCDs      CODE STATUS: FULL CODE (confirmed on admission)  NOK: Rosa Isela Singh (spouse) [phone]           JUAN RAMON PIKE  Sub Intern      I personally saw, examined, and discussed the patient above. I have reviewed and agree with the medical student note above. All edits or corrections have been made directly into the note, including the physical exam and assessment/plan. Patient was seen and discussed with the attending, Dr. Mcghee, who agrees with the management plan.     Mao Horton MD  PGY1, Neurology

## 2025-03-05 ENCOUNTER — PATIENT OUTREACH (OUTPATIENT)
Dept: PRIMARY CARE | Facility: CLINIC | Age: 55
End: 2025-03-05
Payer: COMMERCIAL

## 2025-03-05 LAB
ACID FAST STN SPEC: NORMAL
CD3+CD4+ CELLS NFR BLD: 56 %
CD3+CD4+ CELLS/CD3+CD8+ CLL BLD: 1.3 %
CD3+CD8+ CELLS NFR BLD: 43 %
CELL POPULATIONS: NORMAL
DIAGNOSIS: NORMAL
FLOW DIFFERENTIAL: NORMAL
FLOW TEST ORDERED: NORMAL
FLUID CELL COUNT: 29 /UL
LAB TEST METHOD: NORMAL
LABORATORY COMMENT REPORT: NORMAL
LABORATORY COMMENT REPORT: NORMAL
MYCOBACTERIUM SPEC CULT: NORMAL
NUMBER OF CELLS COLLECTED: NORMAL
PATH REPORT.FINAL DX SPEC: NORMAL
PATH REPORT.GROSS SPEC: NORMAL
PATH REPORT.RELEVANT HX SPEC: NORMAL
PATH REPORT.TOTAL CANCER: NORMAL
PATH REPORT.TOTAL CANCER: NORMAL
RESIDENT REVIEW: NORMAL
SIGNATURE COMMENT: NORMAL
SPECIMEN VIABILITY: NORMAL

## 2025-03-05 NOTE — DISCHARGE SUMMARY
Discharge Diagnosis  Edema of spinal cord    Issues Requiring Follow-Up  Follow up with Neuro-immunology with Dr Miller (3/13/2025)  Follow up with PCP for post-discharge and to follow pulmonary granuloma biopsy final result     Test Results Pending At Discharge  Pending Labs       Order Current Status    Encephalopathy-Autoimmune Evaluation,CSF Collected (03/04/25 1051)    CD4/8 In process    CD4/8 Panel In process    CNS Demyelinating Disease,Serum In process    Cytology Consultation (Non-Gynecologic) In process    Flow Cytometry Test In process    Flow Cytometry Test In process    NERI Virus Antibody with Reflex To Inhibition Assay In process    Oligoclonal Banding In process    Oligoclonal Banding, CSF Collection In process    AFB Culture/Smear Preliminary result    Fungal Culture/Smear Preliminary result    Respiratory Culture/Smear Preliminary result            Hospital Course  This is a 54 y.o. male with PMH of bilateral optic neuritis in 2020, presenting with presenting with abnormal spinal cord signal found on 2/22, c/f NMO/MOGAD (or relatively unlikely MS) v.s. sarcoidosis v.s. malignancy.      After he was admitted, Brain and C spine MRI w/wo contrast showed non enhancing supratentorial white matter FLAIR. Spinal cord was only remarkable for a small enhancing lesion around T11-12 with edema. Otherwise C and L spine didn't show hyperintensities. Labs was remarkable for Copper 65, but considering the absence of other signs of copper deficiency (leukopenia, anemia), and his hx of bilateral optic neuritis, copper deficiency related myelopathy is still low on differential.     Bronchoscopy with EBUS for lung granuloma and PET were done on 3/3. LP was performed and sent for flow cytometry, cytology, OCB, IgG index, paraneoplastic panel to r/o malignancy or inflammatory processes. During his stay, patient reported moderate improvement in his balance and walking while on prednisolone.    He will be on prednisolone  60mg and VitD at discharge and will be followed up at neuro-immunology clinic on (3/13/2025)    Pertinent Physical Exam At Time of Discharge  Physical Exam  MENTAL STATUS:  General appearance: No acute distress  Orientation: A&Ox4  Language: Expression, comprehension intact     CRANIAL NERVES:  - II/III: PERRL  - II:   .. Visual fields intact to confrontation bilaterally tested individually and together  .. 3/3 Visual acuity R20/20, L20/20, no color desaturation or painful eyemovement  - III, IV, VI: EOM full to pursuit without nystagmus  - V: V1-V3 sensation intact bilaterally  - VII: Face muscles symmetric with smile and eye closure  - VIII: Intact to interview  - IX, X: Palate elevated symmetrically bilaterally, no hoarseness  - XI: 5/5 strength on shoulder shrugging bilaterally  - XII: Tongue midline without atrophy or fasciculation     MOTOR: Tone and bulk normal in all extremities. No tremor, no abnormal movements.     STRENGTH:      R          L  Deltoid             5          5  Biceps              5          5  Triceps             5          5                    5          5  Hip abduction  5          5  Hip adduction  5          5  Hip flexion        5          5  Quadriceps       5          5  Hamstrings       5           5  DorsiFlex           5           5  PlantarFlex        5            5     REFLEXES: Brisker over lower extremities                            R             L  Biceps              +2        +2  Triceps             +2        +2  Brachioradialis  +2        +2  Patellar             +3        +3  Achilles              3           3     No Babinski signs  No clonus     COORDINATION:   Intact on finger to nose bl, intact on heel to shin Romberg +   Gait wide based, can't walk on heels due to imbalance     SENSORY:   - No sensory level with pinprick in the front or vibration in the back  - Impaired proprioception of bilateral toes (inconsistently miss 2/5 in bilateral feet)  - Intact to  light touch, pin prick, vibration in bl UE and LE.          Home Medications     Medication List      START taking these medications     cholecalciferol 5,000 Units tablet; Commonly known as: Vitamin D-3; Take   1 tablet (5,000 Units) by mouth once daily.   pantoprazole 40 mg EC tablet; Commonly known as: ProtoNix; Take 1 tablet   (40 mg) by mouth once daily in the morning. Take before meals. Do not   crush, chew, or split.   predniSONE 10 mg tablet; Commonly known as: Deltasone; Take 6 tablets   (60 mg) by mouth once daily for 14 days, THEN 5 tablets (50 mg) once daily   for 14 days, THEN 4 tablets (40 mg) once daily for 14 days, THEN 3 tablets   (30 mg) once daily for 14 days, THEN 2 tablets (20 mg) once daily for 14   days, THEN 1 tablet (10 mg) once daily for 14 days, THEN 1 tablet (10 mg)   once daily for 14 days.; Start taking on: March 5, 2025   sulfamethoxazole-trimethoprim 400-80 mg tablet; Commonly known as:   Bactrim; Take 2 tablets by mouth every other day for 14 days.     STOP taking these medications     acyclovir 400 mg tablet; Commonly known as: Zovirax   ascorbic acid 100 mg tablet; Commonly known as: Vitamin C   fexofenadine 180 mg tablet; Commonly known as: Allegra   omeprazole 20 mg tablet,delayed release (DR/EC) EC tablet; Commonly   known as: PriLOSEC   Trulicity 4.5 mg/0.5 mL pen injector; Generic drug: dulaglutide       Outpatient Follow-Up  Future Appointments   Date Time Provider Department Center   3/13/2025  2:30 PM Ted Miller MD PhD [id number] [facility]       Mao Horton MD  PGY-1 Neurology

## 2025-03-05 NOTE — PROGRESS NOTES
Discharge Facility:  [facility]   Discharge Diagnosis: Edema of spinal cord   Admission Date: 2/27/25  Discharge Date: 3/4/25    PCP Appointment Date: Declined to schedule due to neurology follow up. He does need a T-Spot TB- I routed a message to the office.   Specialist Appointment Date: 3/13/25- Neurology  Hospital Encounter and Summary Linked: ED to Hosp-Admission (Discharged) with Nikolay Mcghee MD; Lani Henderson MD (02/27/2025)     See discharge assessment below for further details    Lumbar Puncture (03/04/2025 13:45)     Bronchoscopy Tier 2; Diagnostic, w BAL, w EBUS (03/03/2025 14:56)     NM PET CT whole body (03/03/2025 09:13)     Wrap Up  Wrap Up Additional Comments: Patients discharge summary states he is in need of a TB injection. Education provided to patient on what this test is. Message routed to the office for the patient to help him schedule this appt. Pt. declined to schedule due to his plans of follow up with neurology. He denies further questions/concners (3/5/2025  9:55 AM)    Engagement  Call Start Time: 0955 (Call completed with Rafael) (3/5/2025  9:55 AM)    Medications  Medications reviewed with patient/caregiver?: Yes (3/5/2025  9:55 AM)  Is the patient having any side effects they believe may be caused by any medication additions or changes?: No (3/5/2025  9:55 AM)  Does the patient have all medications ordered at discharge?: Yes (3/5/2025  9:55 AM)  Care Management Interventions: No intervention needed (3/5/2025  9:55 AM)  Prescription Comments: START taking: cholecalciferol (Vitamin D-3) Start taking on: March 5, 2025 pantoprazole (ProtoNix) Start taking on: March 5, 2025 predniSONE (Deltasone) Start taking on: March 5, 2025 STOP taking: acyclovir 400 mg tablet (Zovirax) ascorbic acid 100 mg tablet (Vitamin C) fexofenadine 180 mg tablet (Allegra) omeprazole 20 mg tablet,delayed release (DR/EC) EC tablet (PriLOSEC) Trulicity 4.5 mg/0.5 mL pen injector (dulaglutide) (3/5/2025  9:55  AM)  Is the patient taking all medications as directed (includes completed medication regime)?: Yes (3/5/2025  9:55 AM)  Medication Comments: Patient recieved all medication via Meds to bed program at the hospital prior to discharge. Denies questions/concners (3/5/2025  9:55 AM)    Appointments  Does the patient have a primary care provider?: Yes (3/5/2025  9:55 AM)  Care Management Interventions: Advised patient to make appointment (3/5/2025  9:55 AM)  Has the patient kept scheduled appointments due by today?: Yes (3/5/2025  9:55 AM)  Care Management Interventions: Advised patient to keep appointment (3/5/2025  9:55 AM)    Self Management  What is the home health agency?: n/a (3/5/2025  9:55 AM)  Has home health visited the patient within 72 hours of discharge?: Not applicable (3/5/2025  9:55 AM)  What Durable Medical Equipment (DME) was ordered?: n/a (3/5/2025  9:55 AM)    Patient Teaching  Does the patient have access to their discharge instructions?: Yes (3/5/2025  9:55 AM)  Care Management Interventions: Reviewed instructions with patient (3/5/2025  9:55 AM)  What is the patient's perception of their health status since discharge?: Same (3/5/2025  9:55 AM)  Is the patient/caregiver able to teach back the hierarchy of who to call/visit for symptoms/problems? PCP, Specialist, Home Health nurse, Urgent Care, ED, 911: Yes (3/5/2025  9:55 AM)  Patient/Caregiver Education Comments: Patient states he is doing the same as when he discarged from the hopsital. He plans to follow up with Neurology on the 13th. (3/5/2025  9:55 AM)

## 2025-03-06 LAB
ALB CSF/SERPL: 4.7 RATIO (ref 0–9)
ALBUMIN CSF-MCNC: 20 MG/DL (ref 0–35)
ALBUMIN SERPL-MCNC: 4214 MG/DL (ref 3500–5200)
BACTERIA SPEC RESP CULT: NORMAL
GRAM STN SPEC: NORMAL
GRAM STN SPEC: NORMAL
IGG CSF-MCNC: 2.2 MG/DL (ref 0–6)
IGG SERPL-MCNC: 721 MG/DL (ref 768–1632)
IGG SYNTH RATE SER+CSF CALC-MRATE: 0.6 MG/D
IGG/ALB CLEAR SER+CSF-RTO: 0.64 RATIO (ref 0.28–0.66)
IGG/ALB CSF: 0.11 RATIO (ref 0.09–0.25)
LABORATORY COMMENT REPORT: NORMAL
LABORATORY COMMENT REPORT: NORMAL
OLIGOCLONAL BANDS CSF ELPH-IMP: ABNORMAL
OLIGOCLONAL BANDS CSF ELPH-IMP: NEGATIVE
OLIGOCLONAL BANDS CSF IEF: 0 BANDS (ref 0–1)
PATH REPORT.FINAL DX SPEC: NORMAL
PATH REPORT.GROSS SPEC: NORMAL
PATH REPORT.INTRAOP OBS SPEC DOC: NORMAL
PATH REPORT.TOTAL CANCER: NORMAL
RESIDENT REVIEW: NORMAL

## 2025-03-07 DIAGNOSIS — B39.2 PULMONARY HISTOPLASMOSIS (MULTI): Primary | ICD-10-CM

## 2025-03-07 LAB
FUNGUS SPEC CULT: NORMAL
FUNGUS SPEC FUNGUS STN: NORMAL
LABORATORY COMMENT REPORT: NORMAL
PATH REPORT.ADDENDUM SPEC: NORMAL
PATH REPORT.COMMENTS IMP SPEC: NORMAL
PATH REPORT.FINAL DX SPEC: NORMAL
PATH REPORT.GROSS SPEC: NORMAL
PATH REPORT.TOTAL CANCER: NORMAL

## 2025-03-07 NOTE — RESULT ENCOUNTER NOTE
Results of endobronchial biopsy and EBUS showing yeast consistent with histoplasmosis. Results discussed with patient via phone. Referral placed to infectious disease. All questions answered for patient at this time.

## 2025-03-10 LAB
AQP4 H2O CHANNEL IGG SERPL QL: NEGATIVE
FUNGUS SPEC CULT: NORMAL
FUNGUS SPEC FUNGUS STN: NORMAL
IMMUNOLOGIST REVIEW: NORMAL
MOG IGG1 SERPL QL FC: NEGATIVE

## 2025-03-11 ENCOUNTER — CLINICAL SUPPORT (OUTPATIENT)
Dept: PRIMARY CARE | Facility: CLINIC | Age: 55
End: 2025-03-11
Payer: COMMERCIAL

## 2025-03-11 DIAGNOSIS — Z11.1 ENCOUNTER FOR TUBERCULIN SKIN TEST: ICD-10-CM

## 2025-03-11 PROCEDURE — 86580 TB INTRADERMAL TEST: CPT | Performed by: FAMILY MEDICINE

## 2025-03-11 NOTE — PROGRESS NOTES
Subjective   Patient ID: Rafael Singh is a 54 y.o. male who presents for PPD Placement (Pt in for tb placement.  Left forearm.  ).    HPI     Review of Systems    Objective   There were no vitals taken for this visit.    Physical Exam    Assessment/Plan        Patient received TB skin test placement today. Patient was instructed to return in 48-72 hours for test to be read.

## 2025-03-12 LAB
ACID FAST STN SPEC: NORMAL
JC VIRUS AB (SJC): POSITIVE
JCV INDEX VALUE (SJC): 2.15
MYCOBACTERIUM SPEC CULT: NORMAL

## 2025-03-13 ENCOUNTER — CLINICAL SUPPORT (OUTPATIENT)
Dept: PRIMARY CARE | Facility: CLINIC | Age: 55
End: 2025-03-13
Payer: COMMERCIAL

## 2025-03-13 ENCOUNTER — OFFICE VISIT (OUTPATIENT)
Dept: NEUROLOGY | Facility: CLINIC | Age: 55
End: 2025-03-13
Payer: COMMERCIAL

## 2025-03-13 VITALS
BODY MASS INDEX: 28.21 KG/M2 | DIASTOLIC BLOOD PRESSURE: 65 MMHG | WEIGHT: 191 LBS | SYSTOLIC BLOOD PRESSURE: 115 MMHG | HEART RATE: 72 BPM | RESPIRATION RATE: 18 BRPM

## 2025-03-13 DIAGNOSIS — G37.3 TRANSVERSE MYELITIS (MULTI): Primary | ICD-10-CM

## 2025-03-13 DIAGNOSIS — G95.9 SPINAL CORD LESION (MULTI): ICD-10-CM

## 2025-03-13 DIAGNOSIS — G95.19 EDEMA OF SPINAL CORD: ICD-10-CM

## 2025-03-13 DIAGNOSIS — H46.9 OPTIC NEURITIS: ICD-10-CM

## 2025-03-13 PROCEDURE — 99215 OFFICE O/P EST HI 40 MIN: CPT | Mod: GC | Performed by: PSYCHIATRY & NEUROLOGY

## 2025-03-13 ASSESSMENT — PAIN SCALES - GENERAL: PAINLEVEL_OUTOF10: 4

## 2025-03-13 NOTE — PROGRESS NOTES
"Subjective     Rafael Singh is a 54 y.o. year old male with hx of bilateral optic neuritis (2020, R>L) presenting post hospital admission to establish care after he was found to have transverse myelitis of the distal thoracic spine.    History of Present Illness     Per Chart Review:  \"Hospital Course (2/27/3/4/2025) Mr. Singh is a 54-year-old man with a history of bilateral optic neuritis who presented with gait imbalance and lumbar back pain. MRI thoracic spine performed outpatient was personally reviewed and demonstrates a foci of enhancement in the distal thoracic spine with surrounding edema. He has previously received 3 doses of 1g solumedrol at [facility]. Ddx includes inflammatory etiology such as NMO/MOGAD/Sarcoidosis. PET scan negative for neoplasm. Bronchoscopy path pending. LP planned for today. Continue 60mg prednisone. He has follow up outpatient 3/13 with Dr. Miller to follow up results and management \"     He will be on prednisolone 60mg and VitD at discharge and will be followed up at neuro-immunology clinic on (3/13/2025)     LP: RBC 10,000, WBC 29, protein 57, glucose 66, flow cytometry unremarkable, cytology no malignant cells, no oligoclonal bands detected    EBUS biopsy: findings consistent with Histoplasmosis    DISEASE SUMMARY  Date of onset: June 2020  Date of diagnosis of MS: NA  Disease course at onset: Monophasic  Current disease course: Monophasic  Previous disease therapies: NA  Current disease therapy: NA  Most recent MRI brain: 6/24/20  Most recent MRI cervical spine: NA  CSF: NA  JCV serology result and date: NA     Labs 7/2/2020  MOG IgG negative  AQP4 negative  Vitamin D 29.7 (low)  HAILEY unremarkable  Proteinase 3 Ab <0.2  ANCA panel negative  Lyme IgG/IgM negative  Syphilis Nonreactive  Copper 86    Folate 13.2  B12 685  ACE/Angiotensin 28     MRI 6/24/2020 w and wo IV contrast   Right greater than left optic neuritis     6/18/2020 Optic Disc Photo with disc edema in both " eyes    Pt Interview:    Pt states he continues to have lower back pain, constant numbness and tingling of the lower extremities. Specifically, burning/numbness from buttock to the bottom of his feet. Feels the steroids he's on now are helping with the symptoms but they still persist.    Also feels the toes are stiff bilaterally as well as ankles, but rest of the LE muscles are fully intact no weakness endorsed.     While in hospital, was unable to tell where his feet were in space, this has since mostly resolved. The burning and numbness to the back of the legs are the same as during admission, but while in the hospital it extended to his groin around L2-L3 distribution.    No issues with vision, upper extremity strength or sensation.     Denies worsened symptoms with heat, exercise, hot showers. Does have a history of smoking, chronic cough. Did have flu-like symptoms prior to recent hospital admission.    Social Hx:  Tobacco: former, quit 12 years ago  Alcohol: occasional, no drinks in a month  Drug Use: denies  Occupation: self employed    No past surgical history on file.  Social History     Tobacco Use    Smoking status: Former     Types: Cigarettes    Smokeless tobacco: Former   Substance Use Topics    Alcohol use: Yes     Allergies   Allergen Reactions    Amoxicillin Other     Hot flashes    Pollen Extracts Other     Eyes burning, and mucus build up     Visit Vitals  Smoking Status Former     Objective   Neurological Exam  Physical Exam    GENERAL APPEARANCE:  No distress, alert, interactive and cooperative.     MENTAL STATE:   Orientation was normal to time, place and person. Recent and remote memory was intact.  Attention span and concentration were normal. Language testing was normal for comprehension, repetition, expression, and naming. General fund of knowledge was intact.     CRANIAL NERVES:   CN 2   Visual fields full to confrontation.   CN 3, 4, 6   Pupils round, 4 mm in diameter, equally reactive to  light. Lids symmetric; no ptosis. EOMs normal alignment, full range with normal saccades, pursuit and convergence.   No nystagmus.   CN 5   Facial sensation intact bilaterally.   CN 7   Normal and symmetric facial strength. Nasolabial folds symmetric.   CN 8   Hearing intact to conversation.  CN 9/10  Palate elevates symmetrically.   CN 11   Normal strength of shoulder shrug and neck turning.   CN 12   Tongue midline, with normal bulk and strength; no fasciculations.     MOTOR:   Muscle bulk and tone were normal in both upper and lower extremities.   No fasciculations, tremor or other abnormal movements were present.                         R          L  Deltoids        5          5  Biceps          5          5  Triceps          5          5  Wrist Flex      5          5  Wrist Ext       5          5    Hip Flex         5          5  Knee Flex      5          5  Knee Ext        5          5  Dorsiflex         5          5  Plantarflex      5          5    REFLEXES:                       R          L  BR:               2         2  Biceps:         2          2  Triceps:        2          2  Knee:            3          3  Ankle:          3          3    Babinski: toes downgoing to plantar stimulation. No clonus or other pathologic reflexes present.     SENSORY:   RUE/LUE: intact to pinprick, temperature, vibration, proprioception  LLE/RLE: length dependent loss of vibration improving to the knees, pinprick intact proximal and distal, proprioception abnormality noted distal LE bilaterally at first metatarsal joint    COORDINATION:    In both upper extremities, finger-nose-finger was intact without dysmetria or overshoot.   In both lower extremities, heel-to-shin was intact.      GAIT:   Station was stable with a normal base. Gait was stable with a normal arm swing and speed. No ataxia, shuffling, steppage or waddling was present. No circumduction was present. Tandem gait was intact. No Romberg sign was present  (however significant swaying noted).     Assessment/Plan     Rafael Singh is a 54 y.o. year old male with hx of bilateral optic neuritis (2020, R>L) presenting post hospital admission to [facility] care after he was found to have transverse myelitis of the distal thoracic spine. Patient initially presented with blurred vision R>L in 2020, diagnosed with pre-chiasmatic optic neuritis, but workup negative for NMO/MOGAD, resolved without treatment. Represents with BLE dysesthesia, found to have T11-T12 transverse myelitis. MRI Brain with subcortical/juxtacortical nonenhancing lesion R frontoparietal region. Findings on CT CAP c/f lung granulomas, s/p EBUS with biopsy, initially c/f sarcoid. However, final pathology indicating possible Histoplasmosis. Additionally, LP performed that was bland, negative OCBs. Diagnosis at this point is unclear, however MS remains on the differential given optic neuritis/transverse myelitis in time and space, possible juxtacortical lesion R frontoparietal, and partial response to steroids.    Recommendations:  -ID appointment to assess EBUS biopsy results of histoplasmosis  -Repeat MRI Thoracic Spine w/wo 4/2025  -Neuro-ophthalmology referral to assess prior optic neuritis, whether it was truly optic neuritis vs uveitis and if there are residual findings c/w multiple sclerosis  -Steroid taper changed from 10mg decrease weekly instead of biweekly (50mg 3/14, 40mg 3/21, 30mg 3/28, 20mg 4/4, 10mg 4/11, off 4/18)  -Offered gabapentin, pt declined  -RTC 3 months    Pt seen and discussed with attending Dr. Miller who agrees with assessment and plan,    Nicholas Jacobs DO  PGY-2 Neurology

## 2025-03-17 ENCOUNTER — TELEPHONE (OUTPATIENT)
Dept: ORTHOPEDIC SURGERY | Facility: HOSPITAL | Age: 55
End: 2025-03-17
Payer: COMMERCIAL

## 2025-03-17 DIAGNOSIS — H46.9 OPTIC NEURITIS: Primary | ICD-10-CM

## 2025-03-17 LAB
FUNGUS SPEC CULT: NORMAL
FUNGUS SPEC FUNGUS STN: NORMAL

## 2025-03-17 NOTE — TELEPHONE ENCOUNTER
Dr. Rondon is not part of our department any longer the patient can schedule with any of the other Primary Care Sports Medicine doctors in the department for appointment

## 2025-03-17 NOTE — TELEPHONE ENCOUNTER
Copied from CRM #[id number]. Topic: Information Request - Doctor, Hospital, or Provider  >> Mar 17, 2025 11:20 AM Orin GUNDERSON wrote:  Patient is looking to schedule a gel injection.

## 2025-03-19 ENCOUNTER — PATIENT OUTREACH (OUTPATIENT)
Dept: PRIMARY CARE | Facility: CLINIC | Age: 55
End: 2025-03-19
Payer: COMMERCIAL

## 2025-03-19 LAB
ACID FAST STN SPEC: NORMAL
MYCOBACTERIUM SPEC CULT: NORMAL

## 2025-03-19 NOTE — PROGRESS NOTES
Call regarding appt. Office Visit with Ted Miller MD PhD (03/13/2025)  after hospitalization.  At time of outreach call the patient feels as if their condition has improved since last visit.  Reviewed the neurology appointment with the pt and addressed any questions or concerns.    Pt. Denies questions/concerns at this time.

## 2025-03-24 LAB
FUNGUS SPEC CULT: NORMAL
FUNGUS SPEC FUNGUS STN: NORMAL

## 2025-03-26 LAB
ACID FAST STN SPEC: NORMAL
MYCOBACTERIUM SPEC CULT: NORMAL

## 2025-03-27 ENCOUNTER — PHARMACY VISIT (OUTPATIENT)
Dept: PHARMACY | Facility: CLINIC | Age: 55
End: 2025-03-27
Payer: MEDICAID

## 2025-03-27 PROCEDURE — RXMED WILLOW AMBULATORY MEDICATION CHARGE

## 2025-03-31 ENCOUNTER — TELEPHONE (OUTPATIENT)
Dept: PRIMARY CARE | Facility: CLINIC | Age: 55
End: 2025-03-31
Payer: COMMERCIAL

## 2025-03-31 DIAGNOSIS — B39.9 HISTOPLASMOSIS: Primary | ICD-10-CM

## 2025-03-31 DIAGNOSIS — K21.00 GASTROESOPHAGEAL REFLUX DISEASE WITH ESOPHAGITIS WITHOUT HEMORRHAGE: ICD-10-CM

## 2025-03-31 RX ORDER — ITRACONAZOLE 100 MG/1
200 CAPSULE ORAL 3 TIMES DAILY
Start: 2025-03-31 | End: 2025-06-29

## 2025-03-31 NOTE — TELEPHONE ENCOUNTER
Rafael was prescribed Itraconazole for 12 weeks and was told that he should not take his pantoprazole do to some sort of a reaction between the two medications. He is wondering what heartburn medication he may be able to take with this medication?

## 2025-04-02 ENCOUNTER — PHARMACY VISIT (OUTPATIENT)
Dept: PHARMACY | Facility: CLINIC | Age: 55
End: 2025-04-02
Payer: MEDICAID

## 2025-04-02 LAB
ACID FAST STN SPEC: NORMAL
MYCOBACTERIUM SPEC CULT: NORMAL

## 2025-04-02 PROCEDURE — RXMED WILLOW AMBULATORY MEDICATION CHARGE

## 2025-04-02 RX ORDER — ITRACONAZOLE 100 MG/1
CAPSULE ORAL
Qty: 354 CAPSULE | Refills: 3 | OUTPATIENT
Start: 2025-03-19

## 2025-04-04 ENCOUNTER — TELEMEDICINE (OUTPATIENT)
Dept: PHARMACY | Facility: HOSPITAL | Age: 55
End: 2025-04-04
Payer: COMMERCIAL

## 2025-04-04 ENCOUNTER — PHARMACY VISIT (OUTPATIENT)
Dept: PHARMACY | Facility: CLINIC | Age: 55
End: 2025-04-04

## 2025-04-04 DIAGNOSIS — K21.00 GASTROESOPHAGEAL REFLUX DISEASE WITH ESOPHAGITIS WITHOUT HEMORRHAGE: ICD-10-CM

## 2025-04-04 DIAGNOSIS — B39.9 HISTOPLASMOSIS: ICD-10-CM

## 2025-04-04 RX ORDER — BISMUTH SUBSALICYLATE 525 MG/30ML
LIQUID ORAL
COMMUNITY

## 2025-04-04 NOTE — Clinical Note
Patient ended up speaking to a pharmacist at [facility] before visit today- agree that Pepto-Bismol is patient's best option to help manage GERD, as any other agent will significantly impact the pH of the stomach. Patient to contact me/the office if additional questions.

## 2025-04-04 NOTE — PROGRESS NOTES
Outpatient Clinical Pharmacy Visit  Rafael Singh is a 54 y.o. male who was referred to the ambulatory Clinical Pharmacy Team for their GERD and Histoplasmosis.    Referring Provider: Marti Bansal MD    Allergies  Allergies   Allergen Reactions    Amoxicillin Other     Hot flashes    Pollen Extracts Other     Eyes burning, and mucus build up       Preferred Pharmacy  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 Was told by his infectious disease physician that he needed to temporarily discontinue pantoprazole 40 mg daily while taking itraconazole (see visit notes in media) due to pantoprazole decreasing the acidity of the stomach and thus, impacting absorption of itraconazole.     Today, Rafael notes that he is struggling significantly with his self-described chronic heartburn. When discussing aggravating/remitting factors, he notes that no specific foods aggravate his heartburn, and that pantoprazole typically manages his heartburn very well.     Current Pharmacotherapy  Itraconazole 200 mg TID x3 days, then 200 mg BID x12 weeks  Prednisone taper (high dose), see medication list, starting 3/5/25 x 14 weeks    Rafael notes he actually already spoke with a pharmacist at [facility], who (per patient) stated that taking OTC Pepto-Bismol is his only option to help manage symptoms of heartburn.     I further discussed with Rafael that due to the mechanism of action of heartburn medications  (PPI, H2RAs, other oral antacids such as Tums, etc), these medications will lower the acidity of the stomach/encourage a more 'basic' environment in the stomach, which will impact absorption of itraconazole.     Although Bepto-Bismol is not a 'first-line' agent to assist with heartburn, it may assist, and works by 'coating' the stomach/esophagus to protect from stomach acid. It may slightly impact the pH of the stomach, but certainly not to the degree of other agents.     No other recommendations at this time- patient to contact myself if any additional questions.     Laboratory Results  Lab Results   Component Value Date    BILITOT 0.4 02/27/2025    CALCIUM 9.4 03/04/2025    CO2 27 03/04/2025     03/04/2025    CREATININE 1.09 03/04/2025    GLUCOSE 94 03/04/2025    ALKPHOS 63 02/27/2025    K 3.9 03/04/2025    PROT 7.1 02/27/2025     03/04/2025    AST 20 02/27/2025    ALT 48 02/27/2025    BUN 15 03/04/2025    ANIONGAP 14 03/04/2025    MG 2.22 03/04/2025    PHOS 3.4 03/04/2025    ALBUMIN 4.1 03/04/2025    GFRMALE >90 08/10/2023    EGFR 82 03/04/2025     Lab Results   Component Value Date    TRIG 179 (H) 08/10/2023    CHOL 211 (H) 08/10/2023    HDL 53.2 08/10/2023     Lab Results   Component Value Date    HGBA1C 5.4 08/10/2023       Assessment/Plan   Problem List Items Addressed This Visit    None  Visit Diagnoses       Histoplasmosis        Gastroesophageal reflux disease with esophagitis without hemorrhage              Assessment  At this ignacio, OTC Pepto-Bismol will be the best option for managing patient's GERD, with minimal impact to the stomach's pH compared to alternative options.     Plan/Changes   Continue all meds under the continuation of care with the referring provider and clinical pharmacy team    Next PCP Follow-Up  TBD    Next Clinical Pharmacy Follow-Up  As needed      Blanche Irvin, PharmD     Verbal consent to manage patient's drug therapy was obtained from the patient. They were  informed they may decline to participate or withdraw from participation in pharmacy services at any time.

## 2025-04-07 ENCOUNTER — HOSPITAL ENCOUNTER (OUTPATIENT)
Dept: RADIOLOGY | Facility: HOSPITAL | Age: 55
Discharge: HOME | End: 2025-04-07
Payer: COMMERCIAL

## 2025-04-07 DIAGNOSIS — G37.3 TRANSVERSE MYELITIS (MULTI): ICD-10-CM

## 2025-04-07 PROCEDURE — 72146 MRI CHEST SPINE W/O DYE: CPT | Mod: 52

## 2025-04-07 RX ORDER — GADOTERATE MEGLUMINE 376.9 MG/ML
0.2 INJECTION INTRAVENOUS
Status: DISCONTINUED | OUTPATIENT
Start: 2025-04-07 | End: 2025-04-07

## 2025-04-09 DIAGNOSIS — G37.3 TRANSVERSE MYELITIS (MULTI): Primary | ICD-10-CM

## 2025-04-09 LAB
ACID FAST STN SPEC: NORMAL
MYCOBACTERIUM SPEC CULT: NORMAL

## 2025-04-09 RX ORDER — DIAZEPAM 5 MG/1
5 TABLET ORAL 2 TIMES DAILY
Qty: 2 TABLET | Refills: 0 | Status: SHIPPED | OUTPATIENT
Start: 2025-04-09 | End: 2025-04-10

## 2025-04-14 ENCOUNTER — HOSPITAL ENCOUNTER (OUTPATIENT)
Dept: RADIOLOGY | Facility: HOSPITAL | Age: 55
End: 2025-04-14
Payer: COMMERCIAL

## 2025-04-16 LAB
ACID FAST STN SPEC: NORMAL
MYCOBACTERIUM SPEC CULT: NORMAL

## 2025-04-21 NOTE — PROGRESS NOTES
Sinus & Skull Base Surgery    Chief Complaint:  Sinusitis    History Of Present Illness:  Reason For Visit:   Rafael Singh presents to me for a self-referred new patient visit for evaluation of sinusitis.    He is most bothered by persistence of postnasal drainage.  He has difficulty getting the mucus out of the front of his nose with blowing.  This has been an issue for the last 7 or 8 years.    He has been having burning in his eyes for about 5 years that comes and goes.  There is no specific trigger for this.    He has ear pressure and fullness and when he pops his ears it will open up but then recur.  He is ears and nose feel consistently blocked.    He has chronic nasal congestion and feels that he cannot get enough air through his nose.  This began in 2024.  He also mentioned an sensation of not being able to get enough air into his lungs happening over the last 12 months.  He also endorses loud snoring.    Main Symptoms:  Patient has anterior nasal drainage.   Worse in the winter.    Patient has posterior nasal drainage.   Sensation on something in his throat.    Patient has nasal airway obstruction.  Bilateral.  Patient does not have facial pain.    Patient does not have facial pressure.    Patient has decreased sense of smell. Decreased 70 % of normal.   Associated Symptoms:   Patient has headaches.  1-2 per month; can start retro-orbital or back of head.    Patient has throat clearing.  with mucous in his throat.   Patient does not have coughing.    Patient does not have dysphonia.   Patient does not have nasal bleeding.    Medications currently on for sinonasal symptoms:  Sudafed PRN  Zyrtec PRN  Mucinex PRN    Medications tried in the past for sinonasal symptoms:  Flonase -- Stopped secondary to lack of benefit.  Saline    Other Pertinent Medical Conditions:   Patient does not have asthma.  Never formally worked up.  Has albuterol and uses PRN during more strenuous activity.   Patient does  "not have aspirin sensitivity.    Patient does not have migraines.    Patient does not have history of allergy testing.   Patient does not have history of sinus surgery.    Patient does not have history of nasal fracture.    The patient does take medical therapy for heartburn.  Uses Prilosec.    The patient has a GI evaluation.   Years ago not following with them now.  The patient has imaging of sinuses.  MRI 2/28/25.    He and I also discussed some recent health issues he has been having from a neurology perspective.  This has resulted in emergency department presentation and admission.  He has a history of optic neuritis.    Active Problems:  Problem List[1]    Past Medical History:  He has a past medical history of Acute bronchitis, unspecified (06/15/2017), Other conditions influencing health status, and Prostatitis (06/19/2023).    Surgical History:  He has no past surgical history on file.     Family History:  Family History[2]    Social History:  He reports that he quit smoking about 13 years ago. His smoking use included cigarettes. He has quit using smokeless tobacco. He reports current alcohol use. He reports that he does not use drugs.     Allergies:  Amoxicillin and Pollen extracts    Current Meds:  Current Medications[3]    Vitals:  Visit Vitals  Temp 36.6 °C (97.9 °F) (Temporal)   Ht 1.753 m (5' 9\")   Wt 93 kg (205 lb)   BMI 30.27 kg/m²   Smoking Status Former   BSA 2.13 m²     Physical Exam:  CONSTITUTIONAL:  Vitals reviewed in nursing chart, well developed, well nourished.    RESPIRATION:  Breathing comfortably, no stridor.  CV:  No clubbing/cyanosis/edema in hands.  EYES:  EOM Intact, sclera normal.  NEURO:  Alert and oriented times 3, Cranial nerves 2-12 intact and symmetric bilaterally.  HEAD AND FACE:  Skin with no masses or lesions, sinuses nontender to palpation.  SALIVARY GLANDS:  Parotid and submandibular glands normal bilaterally.  EARS:  Normal external ears, external auditory canals, and " TMs to otoscopy, normal hearing to whispered voice.  NOSE:  External nose midline, anterior rhinoscopy is normal with limited visualization to the anterior aspect of the inferior turbinates (see nasal endoscopy).  ORAL CAVITY/OROPHARYNX/LIPS:  Normal mucous membranes, normal floor of mouth/tongue/OP, no masses or lesions are noted.  NECK/LYMPH:  No LAD, no thyroid masses.    SINONASAL ENDOSCOPY (CPT 81603): To better evaluate the patient's symptoms, sinonasal endoscopy is indicated.  After discussion of risks and benefits, and topical decongestion and anesthesia, an endoscope was used to perform nasal endoscopy on each side.  A time out identifying the patient, the procedure, the location of the procedure and any concerns was performed prior to beginning the procedure.    Findings:  Examination of the right nasal cavity revealed no evidence of purulence or polyposis at the middle meatus or sphenoethmoid recess. The septum was deviated to the right. Examination of the left nasal cavity revealed no evidence of purulence or polyposis at the middle meatus or sphenoethmoid recess. The nasopharynx was normal.    Results/Data:  I personally reviewed the MRI brain dated 2/28/2025 with the patient today in clinic.  There was a lesion consistent with a mucous retention cyst or polyp within the left maxillary sinus.  There was some inflammatory changes in the floor of the right maxillary.      The impression from the official report is listed below:    IMPRESSION:  A few scattered tiny supratentorial white matter FLAIR hyperintense foci are present. One is seen along the central inferior corpus callosum. Findings are nonspecific but can be seen with stated concern of multiple sclerosis. Other possibilities include chronic small-vessel ischemic change, prior ischemic processes, and certain other autoimmune disorders. No evidence of active demyelination.    I also reviewed his sinuses for a PET/CT that was obtained March 3,  2025.  The mucous retention cyst or polyp was noted in the left maxillary along with inflammatory changes in the floor of the right maxillary.  I did not see significant informatory changes within the remainder of the sinuses.    Provider Impressions:  1.  Rhinorrhea  2.  Nasal airway obstruction; deviated nasal septum  3.  Decreased sense of smell  4.  Ear pressure  5.  Snoring  6.  Headaches  7.  Throat clearing    Discussion:  Rafael Singh and I discussed his exam and symptoms.  We discussed multiple approaches for his symptoms and I made a number of suggestions today.  I suggested beginning with intranasal medical therapy.    Medication started today: Xhance, azelastine, ipratropium    I recommended the patient begin with Xhance to be used consistently over the next 1 month. If at 1 month, there is clear benefit, I recommended continuation. If there is not, I recommended discontinuation of that therapy. At that time, I recommended initiation of azelsatine. If at 1 month there is clear benefit, I recommended continuation. If there is not, I recommended discontinuation of that therapy. At that time, I recommended initiation of ipratropium. If at 1 month there is clear benefit, I recommended continuation. If there is not, I recommended discontinuation of that therapy. We discussed the rationale for staggering the start for these therapies. There are no interactions between these medical therapies and they can be used concurrently if benefit is derived with the initial medication. I recommended discontinuation of any of the nasal sprays for any side effects.    I also think that obtaining nasal cones would be of benefit to determine if his nasal valves could be playing a role in his ongoing symptoms.  We discussed how to obtain this online.    I recommended a laryngology consultation with my voice partner, Jaziel Serrano, to determine if there could be a throat or laryngeal cause for his ongoing symptoms.  I  recommended coordinating this on his way out of clinic today.    We discussed that workup for snoring, if approached from a sleep perspective, would begin with a sleep study.  He may drive benefit if his nasal congestion is improved with topical therapy but it is possible that even with well chosen topical therapy or even nasal airway surgery, his snoring could persist.    Additional options would include formal allergy assessment, or dedicated CT imaging of his sinuses.  I asked him to follow-up with me in 3 to 4 months.  All questions were answered.    Patient Discussion/Summary:  Welcome to Dr. Surjit Chauhan's clinic.  He is an ENT physician that specializes in nose, sinus, and skull base disorders.    Dr. Surjit Chauhan's office number is [phone].  Please call this number to contact his care team regardless of which office you use to access care.  This number is the most direct way to communicate with all the members of the care team.    His care team includes:    :  Yanet Winter  Available to receive calls Monday through Friday from 8:00 am until 4:25 pm  She can help you with scheduling of appointments and any general, non-medical questions about the practice    Primary nurse:  Niru Burr, RN, BSN  Available to receive calls Tuesday through Friday from 8:00 am until 4:25 pm  Niru is also Dr. Chauhan's surgery scheduler and will assist you with planning and scheduling of your surgery during her office hours    Monique Green RN, BSN  Available to receive calls Monday through Thursday from 8:00 am until 4:25 pm    Rhinology Nurse Practitioner:  Monique Alcaraz CNP (sees patients in [place], [facility], and [facility])  She works collaboratively with Dr. Chauhan.  If a more urgent appointment is needed she is a wonderful resource.    Aj Oakes MD (sees patients in [place] and [facility])  Antione Barahona MD (sees patients in [facility], [facility], and [place])  Dr. Chauhan's partners  in the division of Rhinology    For your convenience, Dr. Chauhan sees patients at [facility] and [facility].  While we try to make your appointments as convenient as possible, occasionally a visit to another location may be necessary to provide the best care for you.    We look forward to working with you to meet your healthcare goals.    Scribe Attestation  By signing my name below, I, Donte Kell Foley, attest that this documentation has been prepared under the direction and in the presence of Surjit Chauhan MD.    Signature:  Surjit Chauhan MD       [1]   Patient Active Problem List  Diagnosis    Allergic rhinitis    Arthritis of knee, right    Bilateral optic neuritis    Cervical pain    H/O optic neuritis    Hyperopia with presbyopia    Left knee pain    Right knee pain    Cervical radiculopathy    DDD (degenerative disc disease), lumbar    Facet hypertrophy of lumbar region    Lumbago with sciatica, left side    Lumbago with sciatica, right side    Retrolisthesis of vertebrae    Spondylosis    Valgus deformity of both feet    Edema of spinal cord    Chronic obstructive pulmonary disease (Multi)   [2]   Family History  Adopted: Yes   Problem Relation Name Age of Onset    No Known Problems Mother      No Known Problems Father     [3]   Current Outpatient Medications:     bismuth subsalicylate (Pepto Bismol) 262 mg/15 mL suspension, , Disp: , Rfl:     cholecalciferol (Vitamin D-3) 5,000 Units tablet, Take 1 tablet (5,000 Units) by mouth once daily., Disp: 30 tablet, Rfl: 1    itraconazole (Sporanox) 100 mg capsule, Take 2 capsules (200 mg) by mouth 3 times a day., Disp: , Rfl:     itraconazole (Sporanox) 100 mg capsule, Take 2 capsules by mouth after meals three times a day for 3 days, then twice a day for 12 weeks, Disp: 354 capsule, Rfl: 3    predniSONE (Deltasone) 10 mg tablet, Take 6 tablets (60 mg) by mouth once daily for 14 days, THEN 5 tablets (50 mg) once  daily for 14 days, THEN 4 tablets (40 mg) once daily for 14 days, THEN 3 tablets (30 mg) once daily for 14 days, THEN 2 tablets (20 mg) once daily for 14 days, THEN 1 tablet (10 mg) once daily for 14 days, THEN 1 tablet (10 mg) once daily for 14 days., Disp: 308 tablet, Rfl: 0    diazePAM (Valium) 5 mg tablet, Take 1 tablet (5 mg) by mouth 2 times a day for 2 doses. Take one tablet 60 minutes and one table 30 minutes before the MRI, Disp: 2 tablet, Rfl: 0    pantoprazole (ProtoNix) 40 mg EC tablet, Take 1 tablet (40 mg) by mouth once daily in the morning. Take before meals. Do not crush, chew, or split. (Patient not taking: Reported on 4/22/2025), Disp: 30 tablet, Rfl: 1

## 2025-04-22 ENCOUNTER — APPOINTMENT (OUTPATIENT)
Dept: OTOLARYNGOLOGY | Facility: CLINIC | Age: 55
End: 2025-04-22
Payer: COMMERCIAL

## 2025-04-22 VITALS — TEMPERATURE: 97.9 F | HEIGHT: 69 IN | WEIGHT: 205 LBS | BODY MASS INDEX: 30.36 KG/M2

## 2025-04-22 DIAGNOSIS — R09.81 NASAL CONGESTION WITH RHINORRHEA: Primary | ICD-10-CM

## 2025-04-22 DIAGNOSIS — J32.0 CHRONIC MAXILLARY SINUSITIS: ICD-10-CM

## 2025-04-22 DIAGNOSIS — J34.2 DEVIATED NASAL SEPTUM: ICD-10-CM

## 2025-04-22 DIAGNOSIS — J34.89 NASAL AND SINUS DISCHARGE: ICD-10-CM

## 2025-04-22 DIAGNOSIS — R09.89 THROAT CLEARING: ICD-10-CM

## 2025-04-22 DIAGNOSIS — R43.8 DECREASED SENSE OF SMELL: ICD-10-CM

## 2025-04-22 DIAGNOSIS — J34.89 NASAL CONGESTION WITH RHINORRHEA: Primary | ICD-10-CM

## 2025-04-22 DIAGNOSIS — R09.81 NASAL CONGESTION: ICD-10-CM

## 2025-04-22 LAB
ACID FAST STN SPEC: NORMAL
MYCOBACTERIUM SPEC CULT: NORMAL

## 2025-04-22 PROCEDURE — 99204 OFFICE O/P NEW MOD 45 MIN: CPT | Performed by: OTOLARYNGOLOGY

## 2025-04-22 PROCEDURE — 3008F BODY MASS INDEX DOCD: CPT | Performed by: OTOLARYNGOLOGY

## 2025-04-22 PROCEDURE — 31231 NASAL ENDOSCOPY DX: CPT | Performed by: OTOLARYNGOLOGY

## 2025-04-22 RX ORDER — AZELASTINE 1 MG/ML
2 SPRAY, METERED NASAL 2 TIMES DAILY
Qty: 30 ML | Refills: 3 | Status: SHIPPED | OUTPATIENT
Start: 2025-04-22 | End: 2025-05-22

## 2025-04-22 RX ORDER — FLUTICASONE PROPIONATE 93 UG/1
SPRAY, METERED NASAL
Qty: 16 ML | Refills: 11 | Status: SHIPPED | OUTPATIENT
Start: 2025-04-22

## 2025-04-22 RX ORDER — IPRATROPIUM BROMIDE 21 UG/1
2 SPRAY, METERED NASAL 3 TIMES DAILY
Qty: 30 ML | Refills: 3 | Status: SHIPPED | OUTPATIENT
Start: 2025-04-22

## 2025-04-22 ASSESSMENT — PATIENT HEALTH QUESTIONNAIRE - PHQ9
2. FEELING DOWN, DEPRESSED OR HOPELESS: NOT AT ALL
SUM OF ALL RESPONSES TO PHQ9 QUESTIONS 1 & 2: 0
1. LITTLE INTEREST OR PLEASURE IN DOING THINGS: NOT AT ALL

## 2025-04-28 PROCEDURE — RXMED WILLOW AMBULATORY MEDICATION CHARGE

## 2025-04-29 ENCOUNTER — PHARMACY VISIT (OUTPATIENT)
Dept: PHARMACY | Facility: CLINIC | Age: 55
End: 2025-04-29
Payer: MEDICAID

## 2025-05-05 ENCOUNTER — APPOINTMENT (OUTPATIENT)
Dept: OTOLARYNGOLOGY | Facility: CLINIC | Age: 55
End: 2025-05-05
Payer: COMMERCIAL

## 2025-05-05 VITALS — WEIGHT: 205 LBS | BODY MASS INDEX: 30.27 KG/M2

## 2025-05-05 DIAGNOSIS — K21.9 LARYNGOPHARYNGEAL REFLUX (LPR): Primary | ICD-10-CM

## 2025-05-05 DIAGNOSIS — R09.A2 GLOBUS SENSATION: ICD-10-CM

## 2025-05-05 PROCEDURE — 99202 OFFICE O/P NEW SF 15 MIN: CPT

## 2025-05-05 ASSESSMENT — PATIENT HEALTH QUESTIONNAIRE - PHQ9
1. LITTLE INTEREST OR PLEASURE IN DOING THINGS: NOT AT ALL
2. FEELING DOWN, DEPRESSED OR HOPELESS: NOT AT ALL
SUM OF ALL RESPONSES TO PHQ9 QUESTIONS 1 AND 2: 0

## 2025-05-06 NOTE — PROGRESS NOTES
Reason For Consult  Chief Complaint   Patient presents with    Referral     Throat  Can't swallow         HISTORY OF PRESENT ILLNESS:   Rafael Singh, who is a 55 y.o. male presenting for an initial visit for globus sensation, LPR, and increased effort when swallowing.  The patient reports that he has been expeirncing symptoms of feel like there is something sitting in the throat. Patient reports that swallowing mucus or saliva will be more difficult and require a double swallow at times. Patient has a history of acid reflux and was previously using Pantoprazole but has had to stop the PPI due to being on Itraconazole for Histoplasmosis. Patient reports course of Itraconazole should be complete in the beginning of June 2025.  Patient reports intermittent hoarseness of voice. Patient reports breathing is stable. Patient denies any shortness of breath. Patient reports that he is able to tolerate solids, liquids, and pills when swallowing, the mucus is what feels like is causing issues in the throat. Patient reports former smoking history, quit 13 years ago.        Past Medical History  He has a past medical history of Acute bronchitis, unspecified (06/15/2017), Other conditions influencing health status, and Prostatitis (06/19/2023).  Surgical History  He has no past surgical history on file.     Social History  He reports that he quit smoking about 13 years ago. His smoking use included cigarettes. He has never been exposed to tobacco smoke. He has quit using smokeless tobacco. He reports current alcohol use. He reports that he does not use drugs.    Allergies  Amoxicillin and Pollen extracts    Review of Systems  All 10 systems were reviewed and negative except for above.      Physical Exam  CONSTITUTIONAL: Well developed, well nourished.    VOICE: normal  RESPIRATION: Breathing comfortably, no stridor.    NEURO: Alert and oriented x3, cranial nerves II-XII intact and symmetric bilaterally.    EARS: Normal external  ears, external auditory canals, normal hearing to conversational voice.    NOSE: External nose midline, anterior rhinoscopy is normal with limited visualization to the anterior aspect of the interior turbinates. No lesions noted.     ORAL CAVITY/OROPHARYNX/LIPS: Normal mucous membranes, normal floor of mouth/tongue/OP, no masses or lesions are noted.    SKIN: Neck skin is intact scar or injury.    PSYCH: Alert and oriented with appropriate mood and affect.        Last Recorded Vitals  Weight 93 kg (205 lb).    Procedure  PROCEDURE NOTE:  Recommended flexible laryngoscopy/stroboscopy.  Risks, benefits,  and alternatives were explained.  They wish to proceed and provide verbal consent.     PROCEDURE:  Flexible Laryngoscopy, CPT 50696     POSTPROCEDURE DIAGNOSIS: LPR    INDICATIONS: Inability to tolerate mirror exam or abnormal findings on mirror, Flexible Laryngoscopy/Stroboscopy performed to assess one of the followin. Diagnosis of symptomatic disorder involving the voice, swallow, upper aerodigestive tract, including LINA disorders, or  2. Preoperative evaluation of vocal cord function for individuals undergoing surgery where the RLN or vagus nerves are at risk of injury, or  3. Further evaluation of abnormalities of the upper aerodigestive tract discovered by another modality, such as CT, MRI, bronchoscopy or EGD    Description of Procedure:    After adequate afrin and lidocaine spray, I advanced the endoscope.  Visualization of the nasopharynx, vallecula, posterior pharyngeal walls, pyriform, epiglottis and post cricoid areas was unremarkable.  The following laryngeal findings were noted:    vocal cord movement was normal  closure was complete  Compression was increased AP  FVC   edema was   interarytenoid edema present  lesions were none  the subglottis was widely patent  Pharyngeal wall squeeze was normal    Procedure well tolerated.       ASSESSMENT AND PLAN:   This is an initial visit for LPR, globus  sensation with increased effort when swallowing with clinical findings notable for normal vc movement and closure. No masses or lesions. Discussed with patient signs of LPR and IA edema. Plan to restart acid reflux medications when patient through with Itraconazole dose. Discussed patient to message or call office when complete with anti-fungal for Histoplasmosis and can discuss restarting antireflux medications to help with the LPR symptoms.  Plan to repeat scope in office after use of anti-reflux medications when patient able to restart without and medication interactions. Patient agreeable to plan. All questions answered.

## 2025-05-21 PROCEDURE — RXMED WILLOW AMBULATORY MEDICATION CHARGE

## 2025-05-22 ENCOUNTER — PHARMACY VISIT (OUTPATIENT)
Dept: PHARMACY | Facility: CLINIC | Age: 55
End: 2025-05-22
Payer: MEDICAID

## 2025-05-28 PROCEDURE — RXMED WILLOW AMBULATORY MEDICATION CHARGE

## 2025-06-02 ENCOUNTER — PHARMACY VISIT (OUTPATIENT)
Dept: PHARMACY | Facility: CLINIC | Age: 55
End: 2025-06-02
Payer: MEDICAID

## 2025-06-12 ENCOUNTER — OFFICE VISIT (OUTPATIENT)
Dept: NEUROLOGY | Facility: CLINIC | Age: 55
End: 2025-06-12
Payer: COMMERCIAL

## 2025-06-12 VITALS
DIASTOLIC BLOOD PRESSURE: 74 MMHG | HEART RATE: 63 BPM | RESPIRATION RATE: 18 BRPM | SYSTOLIC BLOOD PRESSURE: 139 MMHG | WEIGHT: 211 LBS | BODY MASS INDEX: 31.16 KG/M2

## 2025-06-12 DIAGNOSIS — B39.9 HISTOPLASMOSIS: ICD-10-CM

## 2025-06-12 DIAGNOSIS — H46.9 BILATERAL OPTIC NEURITIS: ICD-10-CM

## 2025-06-12 DIAGNOSIS — G37.9 CNS DEMYELINATING DISEASE (MULTI): ICD-10-CM

## 2025-06-12 DIAGNOSIS — G37.3 TRANSVERSE MYELITIS (MULTI): Primary | ICD-10-CM

## 2025-06-12 PROCEDURE — 99215 OFFICE O/P EST HI 40 MIN: CPT | Mod: GC | Performed by: PSYCHIATRY & NEUROLOGY

## 2025-06-12 PROCEDURE — 99215 OFFICE O/P EST HI 40 MIN: CPT | Performed by: PSYCHIATRY & NEUROLOGY

## 2025-06-12 RX ORDER — DIAZEPAM 5 MG/1
5 TABLET ORAL 2 TIMES DAILY
Qty: 2 TABLET | Refills: 0 | Status: SHIPPED | OUTPATIENT
Start: 2025-06-12 | End: 2025-06-13

## 2025-06-12 ASSESSMENT — PAIN SCALES - GENERAL: PAINLEVEL_OUTOF10: 6

## 2025-06-12 NOTE — PROGRESS NOTES
Subjective     Rafael Singh is a 55 y.o. year old male with hx of bilateral optic neuritis (2020, R>L), hx of HSV2 infection presenting post hospital admission to establish care after he was found to have transverse myelitis of the distal thoracic spine.    Disease history:   Patient initially presented 2/2025 with gait imbalance, BLE weakness, and lumbar back pain. MRI thoracic spine performed outpatient showed a hyperintensity around T12 on STIR with contrast enhancement. He received 3 days of IVMP 1g and acyclovir given history of HSV2 infection. On exam, noted by [facility] neurology to have no motor deficits or sensory involvement but had gait imbalance and positive romberg. Imaging of brain and rest of spine only notable for scattered white matter FLAIR hyperintensities, non enhancing, nothing in spine. Bronch with EBUS for lung granuloma performed which showed histoplasmosis.  and PET done, LP performed which was bland, no OCBs. Started on prednisolone 60mg and reported moderate improvement. VA was 20/20 bilaterally on discharge.     He had optic neuritis in 2020 after waking up with R eye blurriness. Ophtho noted bilateral disc edema VA was 20/60 in right eye and 20/20 in left eye initially. Had a brain MRI sohwin T2 signal intensity in bilateral optic nerves R>L with YAW enhancement (only can view report) with no demyelinating lesions. Didn't receive steroids at this time. Is seeing Dr. Sullivan next month for ophthalmology eval.     DISEASE SUMMARY  Date of onset: June 2020  Date of diagnosis of MS: NA  Disease course at onset: Monophasic  Current disease course: Monophasic  Previous disease therapies: NA  Current disease therapy: prednisone taper   Most recent MRI brain: 2/2025  Most recent MRI cervical spine: 2/2025  CSF: NA  JCV serology result and date: NA    LP 3/2025: RBC 10,000, WBC 29, protein 57, glucose 66, flow cytometry unremarkable, cytology no malignant cells, no oligoclonal bands detected     Labs  2025:   MOG IgG negative serum (2020), negative   AQP4 negative   Vitamin D 20 (L)   HAILEY unremarkable  Proteinase 3 Ab <0.2 (2020)  ANCA panel negative (2020)   Lyme IgG/IgM negative (2020)  Syphilis Nonreactive  Copper 65.4 (L)    Folate 13.2  B12 685  ACE/Angiotensin 28 (2020)     MRI 2020 w and wo IV contrast   Right greater than left optic neuritis     2020 Optic Disc Photo with disc edema in both eyes    Pt Interview:  Last seen 3/2025. At this time, continued to have lower back pain, constant numbness and tingling of the lower extremities. Continued on steroid taper, still on 10mg and will be off soon. Has not had repeat imaging due to claustrophobia. He saw ID who started him on itraconazole for histoplasmosis. Says walking and paresthesias are improved. No new complaints.     Social Hx:  Tobacco: former, quit 12 years ago  Alcohol: occasional, no drinks in a month  Drug Use: denies  Occupation: self employed    No past surgical history on file.  Social History     Tobacco Use    Smoking status: Former     Current packs/day: 0.00     Types: Cigarettes     Quit date:      Years since quittin.4     Passive exposure: Never    Smokeless tobacco: Former   Substance Use Topics    Alcohol use: Yes     Allergies   Allergen Reactions    Amoxicillin Other     Hot flashes    Pollen Extracts Other     Eyes burning, and mucus build up     Visit Vitals  Smoking Status Former     Objective   Neurological Exam  Physical Exam    GENERAL APPEARANCE:  No distress, alert, interactive and cooperative.     MENTAL STATE:   Orientation was normal to time, place and person. Recent and remote memory was intact.  Attention span and concentration were normal. Language testing was normal for comprehension, repetition, expression, and naming. General fund of knowledge was intact.     CRANIAL NERVES:   CN 2   Visual fields full to confrontation.   CN 3, 4, 6   Pupils round, 4 mm in diameter, equally  reactive to light. Lids symmetric; no ptosis. EOMs normal alignment, full range with normal saccades, pursuit and convergence.   No nystagmus.   CN 5   Facial sensation intact bilaterally.   CN 7   Normal and symmetric facial strength. Nasolabial folds symmetric.   CN 8   Hearing intact to conversation.  CN 9/10  Palate elevates symmetrically.   CN 11   Normal strength of shoulder shrug and neck turning.   CN 12   Tongue midline, with normal bulk and strength; no fasciculations.     MOTOR:   Muscle bulk and tone were normal in both upper and lower extremities.   No fasciculations, tremor or other abnormal movements were present.                         R          L  Deltoids        5          5  Biceps          5          5  Triceps          5          5  Wrist Flex      5          5  Wrist Ext       5          5    Hip Flex         5          5  Knee Flex      5          5  Knee Ext        5          5  Dorsiflex         5          5  Plantarflex      5          5    REFLEXES:                       R          L  BR:               2         2  Biceps:         2          2  Triceps:        2          2  Knee:            3          3  Ankle:          3          3    Babinski: toes downgoing to plantar stimulation. No clonus or other pathologic reflexes present.     SENSORY:   RUE/LUE: intact to pinprick, temperature, vibration, proprioception  LLE/RLE: length dependent loss of vibration improving to the knees, pinprick intact proximal and distal, proprioception intact     COORDINATION:    In both upper extremities, finger-nose-finger was intact without dysmetria or overshoot.   In both lower extremities, heel-to-shin was intact.      GAIT:   Station was stable with a normal base. Gait was stable with a normal arm swing and speed. No ataxia, shuffling, steppage or waddling was present. No circumduction was present. Tandem gait was intact. No Romberg sign was present.     Assessment/Plan   Rafael Singh is a 54 y.o.  year old male with hx of bilateral optic neuritis (2020, R>L) presenting post hospital admission to [facility] care after he was found to have transverse myelitis of the distal thoracic spine. Patient initially presented with blurred vision R>L in 2020, diagnosed with pre-chiasmatic optic neuritis, but workup negative for NMO/MOGAD, resolved without treatment. Represents with BLE dysesthesia, found to have T11-T12 transverse myelitis. MRI Brain with subcortical/juxtacortical nonenhancing lesion R frontoparietal region. Findings on CT CAP c/f lung granulomas, s/p EBUS with biopsy, initially c/f sarcoid. However, final pathology indicating possible Histoplasmosis. Additionally, LP performed that was bland, negative OCBs. Diagnosis at this point is unclear, however MS remains on the differential given optic neuritis/transverse myelitis in time and space, possible juxtacortical lesion R frontoparietal, and partial response to steroids. He was previously seen by Dr. Sullivan in 2021 after his optic neuritis who felt it was optic neuritis not uveitis.     Patient doing well as he finishes his steroid taper. Walking and sensory disturbances are improved subjectively and on exam. His diagnosis is still unclear. Will get repeat imaging.     Recommendations:  - Repeat brain MRI, C/T spine w/wo (needs benzo)  - Repeat NMO/AQP4, MOG serum   - Vitamin D OTC   - Has appt with Dr. Sullivan   - Follow up in 6mo     Pt seen and discussed with attending Dr. Miller who agrees with assessment and plan,    Stephanie Che MD  Neurology PGY-3

## 2025-06-13 ENCOUNTER — HOSPITAL ENCOUNTER (OUTPATIENT)
Dept: RADIOLOGY | Facility: CLINIC | Age: 55
Discharge: HOME | End: 2025-06-13
Payer: COMMERCIAL

## 2025-06-13 DIAGNOSIS — G37.3 TRANSVERSE MYELITIS (MULTI): ICD-10-CM

## 2025-06-13 PROCEDURE — 72157 MRI CHEST SPINE W/O & W/DYE: CPT

## 2025-06-13 PROCEDURE — A9575 INJ GADOTERATE MEGLUMI 0.1ML: HCPCS | Performed by: PSYCHIATRY & NEUROLOGY

## 2025-06-13 PROCEDURE — 2550000001 HC RX 255 CONTRASTS: Performed by: PSYCHIATRY & NEUROLOGY

## 2025-06-13 RX ORDER — GADOTERATE MEGLUMINE 376.9 MG/ML
19 INJECTION INTRAVENOUS
Status: COMPLETED | OUTPATIENT
Start: 2025-06-13 | End: 2025-06-13

## 2025-06-13 RX ADMIN — GADOTERATE MEGLUMINE 19 ML: 376.9 INJECTION INTRAVENOUS at 12:02

## 2025-06-14 ENCOUNTER — LAB (OUTPATIENT)
Dept: LAB | Facility: HOSPITAL | Age: 55
End: 2025-06-14
Payer: COMMERCIAL

## 2025-06-14 DIAGNOSIS — G37.3 ACUTE TRANSVERSE MYELITIS IN DEMYELINATING DISEASE OF CENTRAL NERVOUS SYSTEM: Primary | ICD-10-CM

## 2025-06-14 PROCEDURE — 86256 FLUORESCENT ANTIBODY TITER: CPT

## 2025-06-14 PROCEDURE — 36415 COLL VENOUS BLD VENIPUNCTURE: CPT

## 2025-06-20 LAB
AQP4 H2O CHANNEL IGG SERPL QL: NEGATIVE
IMMUNOLOGIST REVIEW: NORMAL
MOG IGG1 SERPL QL FC: NEGATIVE

## 2025-07-02 ENCOUNTER — HOSPITAL ENCOUNTER (OUTPATIENT)
Dept: RADIOLOGY | Facility: CLINIC | Age: 55
Discharge: HOME | End: 2025-07-02
Payer: COMMERCIAL

## 2025-07-02 DIAGNOSIS — G37.9 CNS DEMYELINATING DISEASE (MULTI): ICD-10-CM

## 2025-07-02 DIAGNOSIS — G37.3 TRANSVERSE MYELITIS (MULTI): ICD-10-CM

## 2025-07-02 PROCEDURE — 72156 MRI NECK SPINE W/O & W/DYE: CPT | Performed by: RADIOLOGY

## 2025-07-02 PROCEDURE — 2550000001 HC RX 255 CONTRASTS: Mod: JZ | Performed by: PSYCHIATRY & NEUROLOGY

## 2025-07-02 PROCEDURE — 70553 MRI BRAIN STEM W/O & W/DYE: CPT

## 2025-07-02 PROCEDURE — 72156 MRI NECK SPINE W/O & W/DYE: CPT

## 2025-07-02 PROCEDURE — A9575 INJ GADOTERATE MEGLUMI 0.1ML: HCPCS | Mod: JZ | Performed by: PSYCHIATRY & NEUROLOGY

## 2025-07-02 PROCEDURE — 70553 MRI BRAIN STEM W/O & W/DYE: CPT | Performed by: RADIOLOGY

## 2025-07-02 RX ORDER — GADOTERATE MEGLUMINE 376.9 MG/ML
20 INJECTION INTRAVENOUS
Status: COMPLETED | OUTPATIENT
Start: 2025-07-02 | End: 2025-07-02

## 2025-07-02 RX ADMIN — GADOTERATE MEGLUMINE 20 ML: 376.9 INJECTION INTRAVENOUS at 13:48

## 2025-07-12 DIAGNOSIS — K21.9 LARYNGOPHARYNGEAL REFLUX (LPR): ICD-10-CM

## 2025-07-14 RX ORDER — PANTOPRAZOLE SODIUM 40 MG/1
40 TABLET, DELAYED RELEASE ORAL
Qty: 90 TABLET | Refills: 1 | Status: SHIPPED | OUTPATIENT
Start: 2025-07-14

## 2025-07-17 ENCOUNTER — APPOINTMENT (OUTPATIENT)
Dept: OPHTHALMOLOGY | Facility: CLINIC | Age: 55
End: 2025-07-17
Payer: COMMERCIAL

## 2025-07-22 ENCOUNTER — APPOINTMENT (OUTPATIENT)
Dept: OTOLARYNGOLOGY | Facility: CLINIC | Age: 55
End: 2025-07-22
Payer: COMMERCIAL

## 2025-07-22 DIAGNOSIS — R09.81 NASAL CONGESTION WITH RHINORRHEA: Primary | ICD-10-CM

## 2025-07-22 DIAGNOSIS — J34.89 NASAL CONGESTION WITH RHINORRHEA: Primary | ICD-10-CM

## 2025-07-22 PROCEDURE — 99213 OFFICE O/P EST LOW 20 MIN: CPT | Performed by: OTOLARYNGOLOGY

## 2025-07-22 NOTE — PROGRESS NOTES
Sinus & Skull Base Surgery    Virtual or Telephone Consent  An interactive audio and video telecommunication system which permits real time communications between the patient (at the originating site) and provider (at the distant site) was utilized to provide this telehealth service.   Verbal consent was requested and obtained from Rafael Singh on this date, 07/22/25 for a telehealth visit and the patient's location was confirmed at the time of the visit.    Chief Complaint:  1.  Rhinorrhea  2.  Nasal airway obstruction; deviated nasal septum  3.  Decreased sense of smell  4.  Ear pressure  5.  Snoring  6.  Headaches  7.  Throat clearing    History Of Present Illness:  Rafael Singh presents since last being seen 4/22/2025.     Since his last evaluation, he trialed several medical therapies.  Xhance by itself did not provide significant benefit.  When he added ipratropium, unfortunately he did not see benefit.  Azelastine has provided some benefit in regard to his nasal breathing and less overall mucus in his throat.    Main Symptoms:  Patient has anterior nasal drainage.  Better.  Patient has posterior nasal drainage.  50% better.    Patient has nasal airway obstruction.  60% better.    Patient has facial pain.  80% better.  Patient has facial pressure.  80% better.  Patient has decreased sense of smell.  80% better.    Associated Symptoms:  Patient has headaches.  Has been diagnosed with MS (back of head)  Patient has throat clearing.  40% better.    Patient does not have coughing.  Patient has dysphonia.  40% better.    Patient does not have nasal bleeding.    Medications currently on for sinonasal symptoms:  Azelastine 1 puff each side   Pantoprazole 40mg  Gaviscon 2 tablets after lunch and dinner   Cetirizine     Active Problems:  Problem List[1]    Past Medical History:  He has a past medical history of Acute bronchitis, unspecified (06/15/2017), Other conditions influencing health status, and  Prostatitis (06/19/2023).    Surgical History:  He has no past surgical history on file.    Family History:  Family History[2]    Social History:  He reports that he quit smoking about 13 years ago. His smoking use included cigarettes. He has never been exposed to tobacco smoke. He has quit using smokeless tobacco. He reports current alcohol use. He reports that he does not use drugs.    Allergies:  Amoxicillin and Pollen extracts    Current Meds:  Current Medications[3]    Vitals:  Visit Vitals  Smoking Status Former     Physical Exam:  Virtual visit.    Provider Impressions:  1.  Rhinorrhea -- improved  2.  Nasal airway obstruction; deviated nasal septum -- improved  3.  Decreased sense of smell -- improved  4.  Ear pressure  5.  Snoring  6.  Facial pain and pressure, headaches  7.  Throat clearing, dysphonia -- improved    Discussion:  Rafael Singh and I discussed his response to medical therapy.  I asked him to continue Azelastine and this can be used up to 2 sprays each nostril twice per day.  I agree with holding Xhance and ipratropium given lack of benefit.  I ask him to continue the medical therapy that was provided through my voice partner and follow-up with me face-to-face in about 3 months.  He was amenable to this and all questions were answered.    Scribe and Provider Attestation  By signing my name below, I, Kell Daley, attest that this documentation has been prepared under the direction and in the presence of Surjit Chauhan MD. All medical record entries made by the scribe were at the direction of Surjit Chauhan MD or personally dictated by Surjit Chauhan MD. I, Surjit Chauhan MD, have reviewed the chart and agree that the record accurately reflects my personal performance of the history, physical exam, discussion and plan.    Signature:  Surjit Chauhan MD       [1]   Patient Active Problem List  Diagnosis    Allergic rhinitis    Arthritis of knee, right     Bilateral optic neuritis    Cervical pain    H/O optic neuritis    Hyperopia with presbyopia    Left knee pain    Right knee pain    Cervical radiculopathy    DDD (degenerative disc disease), lumbar    Facet hypertrophy of lumbar region    Lumbago with sciatica, left side    Lumbago with sciatica, right side    Retrolisthesis of vertebrae    Spondylosis    Valgus deformity of both feet    Edema of spinal cord    Chronic obstructive pulmonary disease (Multi)   [2]   Family History  Adopted: Yes   Problem Relation Name Age of Onset    No Known Problems Mother      No Known Problems Father     [3]   Current Outpatient Medications:     azelastine (Astelin) 137 mcg (0.1 %) nasal spray, Administer 2 sprays into each nostril 2 times a day. Use 2 sprays in each nostril twice daily as needed for nasal drainage., Disp: 30 mL, Rfl: 3    bismuth subsalicylate (Pepto Bismol) 262 mg/15 mL suspension, , Disp: , Rfl:     diazePAM (Valium) 5 mg tablet, Take 1 tablet (5 mg) by mouth 2 times a day for 2 doses. Take one tablet 60 minutes and one table 30 minutes before the MRI, Disp: 2 tablet, Rfl: 0    fluticasone propionate (Xhance) 93 mcg/actuation aerosol breath activated, Spray 1 spray in each nostril twice daily., Disp: 16 mL, Rfl: 11    ipratropium (Atrovent) 21 mcg (0.03 %) nasal spray, Administer 2 sprays into each nostril 3 times a day. Use 2 sprays in each nostril 3 times daily as needed., Disp: 30 mL, Rfl: 3    itraconazole (Sporanox) 100 mg capsule, Take 2 capsules by mouth after meals three times a day for 3 days, then twice a day for 12 weeks, Disp: 354 capsule, Rfl: 3    pantoprazole (ProtoNix) 40 mg EC tablet, TAKE 1 TABLET BY MOUTH ONCE DAILY IN THE MORNING. TAKE BEFORE MEALS. DO NOT CRUSH, CHEW, OR SPLIT., Disp: 90 tablet, Rfl: 1

## 2025-09-02 ENCOUNTER — HOSPITAL ENCOUNTER (OUTPATIENT)
Dept: RADIOLOGY | Facility: HOSPITAL | Age: 55
Discharge: HOME | End: 2025-09-02
Payer: COMMERCIAL

## 2025-09-02 DIAGNOSIS — B39.0 ACUTE PULMONARY HISTOPLASMOSIS CAPSULATI: ICD-10-CM

## 2025-09-02 PROCEDURE — 71260 CT THORAX DX C+: CPT

## 2025-09-02 PROCEDURE — 2550000001 HC RX 255 CONTRASTS: Performed by: INTERNAL MEDICINE

## 2025-09-02 PROCEDURE — 71260 CT THORAX DX C+: CPT | Performed by: RADIOLOGY

## 2025-09-02 RX ADMIN — IOHEXOL 50 ML: 350 INJECTION, SOLUTION INTRAVENOUS at 10:47

## 2025-09-04 ENCOUNTER — TELEPHONE (OUTPATIENT)
Dept: PRIMARY CARE | Facility: CLINIC | Age: 55
End: 2025-09-04
Payer: COMMERCIAL